# Patient Record
Sex: MALE | Race: ASIAN | NOT HISPANIC OR LATINO
[De-identification: names, ages, dates, MRNs, and addresses within clinical notes are randomized per-mention and may not be internally consistent; named-entity substitution may affect disease eponyms.]

---

## 2018-08-16 ENCOUNTER — APPOINTMENT (OUTPATIENT)
Dept: UROLOGY | Facility: CLINIC | Age: 78
End: 2018-08-16
Payer: MEDICARE

## 2018-08-16 VITALS
HEART RATE: 72 BPM | RESPIRATION RATE: 16 BRPM | OXYGEN SATURATION: 98 % | SYSTOLIC BLOOD PRESSURE: 130 MMHG | WEIGHT: 200 LBS | DIASTOLIC BLOOD PRESSURE: 86 MMHG | BODY MASS INDEX: 31.39 KG/M2 | HEIGHT: 67 IN

## 2018-08-16 DIAGNOSIS — Z87.898 PERSONAL HISTORY OF OTHER SPECIFIED CONDITIONS: ICD-10-CM

## 2018-08-16 DIAGNOSIS — Z80.0 FAMILY HISTORY OF MALIGNANT NEOPLASM OF DIGESTIVE ORGANS: ICD-10-CM

## 2018-08-16 DIAGNOSIS — N41.9 INFLAMMATORY DISEASE OF PROSTATE, UNSPECIFIED: ICD-10-CM

## 2018-08-16 DIAGNOSIS — Z87.448 PERSONAL HISTORY OF OTHER DISEASES OF URINARY SYSTEM: ICD-10-CM

## 2018-08-16 PROCEDURE — 99204 OFFICE O/P NEW MOD 45 MIN: CPT

## 2018-08-19 LAB — BACTERIA UR CULT: NORMAL

## 2018-08-21 ENCOUNTER — OUTPATIENT (OUTPATIENT)
Dept: OUTPATIENT SERVICES | Facility: HOSPITAL | Age: 78
LOS: 1 days | End: 2018-08-21
Payer: MEDICARE

## 2018-08-21 VITALS
TEMPERATURE: 98 F | RESPIRATION RATE: 16 BRPM | SYSTOLIC BLOOD PRESSURE: 184 MMHG | HEIGHT: 66 IN | HEART RATE: 74 BPM | WEIGHT: 197.98 LBS | DIASTOLIC BLOOD PRESSURE: 90 MMHG

## 2018-08-21 DIAGNOSIS — N32.81 OVERACTIVE BLADDER: ICD-10-CM

## 2018-08-21 DIAGNOSIS — Z98.890 OTHER SPECIFIED POSTPROCEDURAL STATES: Chronic | ICD-10-CM

## 2018-08-21 DIAGNOSIS — I10 ESSENTIAL (PRIMARY) HYPERTENSION: ICD-10-CM

## 2018-08-21 DIAGNOSIS — R94.31 ABNORMAL ELECTROCARDIOGRAM [ECG] [EKG]: ICD-10-CM

## 2018-08-21 DIAGNOSIS — Z90.79 ACQUIRED ABSENCE OF OTHER GENITAL ORGAN(S): Chronic | ICD-10-CM

## 2018-08-21 DIAGNOSIS — E11.9 TYPE 2 DIABETES MELLITUS WITHOUT COMPLICATIONS: ICD-10-CM

## 2018-08-21 LAB
APPEARANCE UR: CLEAR — SIGNIFICANT CHANGE UP
BILIRUB UR-MCNC: NEGATIVE — SIGNIFICANT CHANGE UP
BLOOD UR QL VISUAL: NEGATIVE — SIGNIFICANT CHANGE UP
COLOR SPEC: SIGNIFICANT CHANGE UP
GLUCOSE UR-MCNC: NEGATIVE — SIGNIFICANT CHANGE UP
HCT VFR BLD CALC: 46.5 % — SIGNIFICANT CHANGE UP (ref 39–50)
HGB BLD-MCNC: 15.7 G/DL — SIGNIFICANT CHANGE UP (ref 13–17)
KETONES UR-MCNC: NEGATIVE — SIGNIFICANT CHANGE UP
LEUKOCYTE ESTERASE UR-ACNC: NEGATIVE — SIGNIFICANT CHANGE UP
MCHC RBC-ENTMCNC: 31.4 PG — SIGNIFICANT CHANGE UP (ref 27–34)
MCHC RBC-ENTMCNC: 33.8 % — SIGNIFICANT CHANGE UP (ref 32–36)
MCV RBC AUTO: 93 FL — SIGNIFICANT CHANGE UP (ref 80–100)
NITRITE UR-MCNC: NEGATIVE — SIGNIFICANT CHANGE UP
NRBC # FLD: 0 — SIGNIFICANT CHANGE UP
PH UR: 6 — SIGNIFICANT CHANGE UP (ref 5–8)
PLATELET # BLD AUTO: 195 K/UL — SIGNIFICANT CHANGE UP (ref 150–400)
PMV BLD: 10.8 FL — SIGNIFICANT CHANGE UP (ref 7–13)
PROT UR-MCNC: SIGNIFICANT CHANGE UP
RBC # BLD: 5 M/UL — SIGNIFICANT CHANGE UP (ref 4.2–5.8)
RBC # FLD: 12.6 % — SIGNIFICANT CHANGE UP (ref 10.3–14.5)
SP GR SPEC: 1.02 — SIGNIFICANT CHANGE UP (ref 1–1.04)
UROBILINOGEN FLD QL: NORMAL — SIGNIFICANT CHANGE UP
WBC # BLD: 6.17 K/UL — SIGNIFICANT CHANGE UP (ref 3.8–10.5)
WBC # FLD AUTO: 6.17 K/UL — SIGNIFICANT CHANGE UP (ref 3.8–10.5)

## 2018-08-21 PROCEDURE — 93010 ELECTROCARDIOGRAM REPORT: CPT

## 2018-08-21 NOTE — H&P PST ADULT - PSH
H/O colonoscopy with polypectomy    H/O left inguinal hernia repair  with mesh 15 years ago  History of prostate surgery  s/p prostate microwave therapy  S/P TURP (status post transurethral resection of prostate)

## 2018-08-21 NOTE — H&P PST ADULT - PMH
HLD (hyperlipidemia)    HTN (hypertension)    Overactive bladder    Type 2 diabetes mellitus    Urinary frequency    Urinary urgency

## 2018-08-21 NOTE — H&P PST ADULT - PROBLEM SELECTOR PLAN 4
Pt with abnormal EKG in PST   Called pt's PCP but gone for the day. Spoke to Nurse Hernandez at PCP office regarding pt's high BP reading, abnormal EKG and reports of occasional palpitations.   Pt to come in tomorrow to see the PCP and obtain referral for cardiology   Cardiac evaluation with ECHO requested Pt with abnormal EKG in PST   Called pt's PCP but gone for the day. Spoke to Nurse Hernandez at PCP office regarding pt's high BP reading, abnormal EKG and reports of occasional palpitations.   Pt to see his PCP tomorrow 8/22 and obtain referral for cardiology   Cardiac evaluation with ECHO requested

## 2018-08-21 NOTE — H&P PST ADULT - CARDIOVASCULAR SYMPTOMS
palpitations/on occasion. last episode "several days ago". Pt repots palpitations not brought on occasion. last episode "several days ago". Pt repots palpitations not brought by activity/palpitations

## 2018-08-21 NOTE — H&P PST ADULT - NEGATIVE GENERAL GENITOURINARY SYMPTOMS
no flank pain R/no urine discoloration/no hematuria/no flank pain L/no bladder infections/no dysuria

## 2018-08-21 NOTE — H&P PST ADULT - NSANTHOSAYNRD_GEN_A_CORE
No. JODY screening performed.  STOP BANG Legend: 0-2 = LOW Risk; 3-4 = INTERMEDIATE Risk; 5-8 = HIGH Risk

## 2018-08-21 NOTE — H&P PST ADULT - ENDOCRINE COMMENTS
Type 2 DM on oral hypoglycemics. Avg fasting glucose 130-140mg/dl. Last Hga1c was 7.3 in July Type 2 DM on oral hypoglycemics. Avg fasting glucose 130-140mg/dl. Last Hga1c was 7.3% in July

## 2018-08-21 NOTE — H&P PST ADULT - PROBLEM SELECTOR PLAN 1
Scheduled for Cystoscopy with Botox on 9/4/2018.  Preop instructions given, pt verbalized understanding  GI prophylaxis provided

## 2018-08-21 NOTE — H&P PST ADULT - RS GEN PE MLT RESP DETAILS PC
good air movement/respirations non-labored/no chest wall tenderness/airway patent/clear to auscultation bilaterally/breath sounds equal

## 2018-08-21 NOTE — H&P PST ADULT - PROBLEM SELECTOR PLAN 3
Pt to hold Actos and Glyburide/Metformin AM of surgery   Accu check to be assessed on admission   OR booking notified of DM status

## 2018-08-21 NOTE — H&P PST ADULT - HISTORY OF PRESENT ILLNESS
77 y/o male with PMH of HTN and Type 2 DM presents to PST for preoperative evaluation with dx of overactive bladder. Pt reports long standing history of urinary urgency and frequency since teen years. h/o TURP and prostate microwave procedure with no success. Scheduled for Cystoscopy with Botox on 9/4/2018.

## 2018-08-21 NOTE — H&P PST ADULT - NEGATIVE GENERAL SYMPTOMS
no fever/no chills/no fatigue/no weight gain/no polyphagia/no polyuria/no polydipsia/no weight loss/no sweating

## 2018-09-03 ENCOUNTER — TRANSCRIPTION ENCOUNTER (OUTPATIENT)
Age: 78
End: 2018-09-03

## 2018-09-04 ENCOUNTER — APPOINTMENT (OUTPATIENT)
Dept: UROLOGY | Facility: AMBULATORY SURGERY CENTER | Age: 78
End: 2018-09-04

## 2018-09-04 ENCOUNTER — OUTPATIENT (OUTPATIENT)
Dept: OUTPATIENT SERVICES | Facility: HOSPITAL | Age: 78
LOS: 1 days | Discharge: HOME | End: 2018-09-04
Payer: MEDICARE

## 2018-09-04 VITALS
HEART RATE: 58 BPM | TEMPERATURE: 97 F | RESPIRATION RATE: 16 BRPM | DIASTOLIC BLOOD PRESSURE: 61 MMHG | SYSTOLIC BLOOD PRESSURE: 180 MMHG | OXYGEN SATURATION: 99 % | HEIGHT: 66 IN | WEIGHT: 197.98 LBS

## 2018-09-04 VITALS
DIASTOLIC BLOOD PRESSURE: 68 MMHG | TEMPERATURE: 98 F | RESPIRATION RATE: 15 BRPM | HEART RATE: 64 BPM | SYSTOLIC BLOOD PRESSURE: 169 MMHG | OXYGEN SATURATION: 99 %

## 2018-09-04 DIAGNOSIS — N32.81 OVERACTIVE BLADDER: ICD-10-CM

## 2018-09-04 DIAGNOSIS — Z98.890 OTHER SPECIFIED POSTPROCEDURAL STATES: Chronic | ICD-10-CM

## 2018-09-04 DIAGNOSIS — Z90.79 ACQUIRED ABSENCE OF OTHER GENITAL ORGAN(S): Chronic | ICD-10-CM

## 2018-09-04 LAB — GLUCOSE BLDC GLUCOMTR-MCNC: 145 MG/DL — HIGH (ref 70–99)

## 2018-09-04 PROCEDURE — 52287 CYSTOSCOPY CHEMODENERVATION: CPT

## 2018-09-04 NOTE — ASU PREOPERATIVE ASSESSMENT, ADULT ([facility] ONLY) - TEACHING/LEARNING LEARNING PREFERENCES
pictorial/written material/individual instruction/skill demonstration/verbal instruction/group instruction

## 2018-09-04 NOTE — BRIEF OPERATIVE NOTE - PROCEDURE
<<-----Click on this checkbox to enter Procedure Cystoscopic injection of botulinum toxin into bladder  09/04/2018    Active  [id number]

## 2018-09-10 PROBLEM — E11.9 TYPE 2 DIABETES MELLITUS WITHOUT COMPLICATIONS: Chronic | Status: ACTIVE | Noted: 2018-08-21

## 2018-09-10 PROBLEM — N32.81 OVERACTIVE BLADDER: Chronic | Status: ACTIVE | Noted: 2018-08-21

## 2018-09-10 PROBLEM — R39.15 URGENCY OF URINATION: Chronic | Status: ACTIVE | Noted: 2018-08-21

## 2018-09-10 PROBLEM — R35.0 FREQUENCY OF MICTURITION: Chronic | Status: ACTIVE | Noted: 2018-08-21

## 2018-09-10 PROBLEM — E78.5 HYPERLIPIDEMIA, UNSPECIFIED: Chronic | Status: ACTIVE | Noted: 2018-08-21

## 2018-09-10 PROBLEM — I10 ESSENTIAL (PRIMARY) HYPERTENSION: Chronic | Status: ACTIVE | Noted: 2018-08-21

## 2018-09-12 ENCOUNTER — APPOINTMENT (OUTPATIENT)
Dept: UROLOGY | Facility: CLINIC | Age: 78
End: 2018-09-12
Payer: MEDICARE

## 2018-09-12 VITALS — HEART RATE: 64 BPM | OXYGEN SATURATION: 98 % | SYSTOLIC BLOOD PRESSURE: 130 MMHG | DIASTOLIC BLOOD PRESSURE: 70 MMHG

## 2018-09-12 DIAGNOSIS — N40.1 BENIGN PROSTATIC HYPERPLASIA WITH LOWER URINARY TRACT SYMPMS: ICD-10-CM

## 2018-09-12 DIAGNOSIS — N13.8 BENIGN PROSTATIC HYPERPLASIA WITH LOWER URINARY TRACT SYMPMS: ICD-10-CM

## 2018-09-12 DIAGNOSIS — N32.81 OVERACTIVE BLADDER: ICD-10-CM

## 2018-09-12 DIAGNOSIS — Z78.9 OTHER SPECIFIED HEALTH STATUS: ICD-10-CM

## 2018-09-12 PROCEDURE — 99214 OFFICE O/P EST MOD 30 MIN: CPT

## 2019-01-16 ENCOUNTER — INPATIENT (INPATIENT)
Facility: HOSPITAL | Age: 79
LOS: 4 days | Discharge: HOME | DRG: 245 | End: 2019-01-21
Attending: INTERNAL MEDICINE | Admitting: INTERNAL MEDICINE
Payer: COMMERCIAL

## 2019-01-16 VITALS
TEMPERATURE: 98 F | SYSTOLIC BLOOD PRESSURE: 178 MMHG | HEART RATE: 126 BPM | WEIGHT: 195.11 LBS | RESPIRATION RATE: 20 BRPM | OXYGEN SATURATION: 98 % | DIASTOLIC BLOOD PRESSURE: 98 MMHG | HEIGHT: 66 IN

## 2019-01-16 DIAGNOSIS — I48.91 UNSPECIFIED ATRIAL FIBRILLATION: ICD-10-CM

## 2019-01-16 DIAGNOSIS — Z98.890 OTHER SPECIFIED POSTPROCEDURAL STATES: Chronic | ICD-10-CM

## 2019-01-16 DIAGNOSIS — Z90.79 ACQUIRED ABSENCE OF OTHER GENITAL ORGAN(S): Chronic | ICD-10-CM

## 2019-01-16 LAB
ALBUMIN SERPL ELPH-MCNC: 4.3 G/DL — SIGNIFICANT CHANGE UP (ref 3.3–5)
ALP SERPL-CCNC: 73 U/L — SIGNIFICANT CHANGE UP (ref 40–120)
ALT FLD-CCNC: 45 U/L — SIGNIFICANT CHANGE UP (ref 10–45)
ANION GAP SERPL CALC-SCNC: 13 MMOL/L — SIGNIFICANT CHANGE UP (ref 5–17)
APTT BLD: 27.4 SEC — LOW (ref 27.5–36.3)
AST SERPL-CCNC: 26 U/L — SIGNIFICANT CHANGE UP (ref 10–40)
BASOPHILS # BLD AUTO: 0 K/UL — SIGNIFICANT CHANGE UP (ref 0–0.2)
BASOPHILS NFR BLD AUTO: 0 % — SIGNIFICANT CHANGE UP (ref 0–2)
BILIRUB SERPL-MCNC: 0.7 MG/DL — SIGNIFICANT CHANGE UP (ref 0.2–1.2)
BUN SERPL-MCNC: 23 MG/DL — SIGNIFICANT CHANGE UP (ref 7–23)
CALCIUM SERPL-MCNC: 9.2 MG/DL — SIGNIFICANT CHANGE UP (ref 8.4–10.5)
CHLORIDE SERPL-SCNC: 105 MMOL/L — SIGNIFICANT CHANGE UP (ref 96–108)
CO2 SERPL-SCNC: 23 MMOL/L — SIGNIFICANT CHANGE UP (ref 22–31)
CREAT SERPL-MCNC: 0.91 MG/DL — SIGNIFICANT CHANGE UP (ref 0.5–1.3)
EOSINOPHIL # BLD AUTO: 0 K/UL — SIGNIFICANT CHANGE UP (ref 0–0.5)
EOSINOPHIL NFR BLD AUTO: 0.4 % — SIGNIFICANT CHANGE UP (ref 0–6)
GLUCOSE SERPL-MCNC: 172 MG/DL — HIGH (ref 70–99)
HCT VFR BLD CALC: 44.4 % — SIGNIFICANT CHANGE UP (ref 39–50)
HGB BLD-MCNC: 15.1 G/DL — SIGNIFICANT CHANGE UP (ref 13–17)
INR BLD: 1 RATIO — SIGNIFICANT CHANGE UP (ref 0.88–1.16)
LYMPHOCYTES # BLD AUTO: 0.7 K/UL — LOW (ref 1–3.3)
LYMPHOCYTES # BLD AUTO: 6.4 % — LOW (ref 13–44)
MCHC RBC-ENTMCNC: 31.9 PG — SIGNIFICANT CHANGE UP (ref 27–34)
MCHC RBC-ENTMCNC: 34 GM/DL — SIGNIFICANT CHANGE UP (ref 32–36)
MCV RBC AUTO: 93.7 FL — SIGNIFICANT CHANGE UP (ref 80–100)
MONOCYTES # BLD AUTO: 0.6 K/UL — SIGNIFICANT CHANGE UP (ref 0–0.9)
MONOCYTES NFR BLD AUTO: 5.9 % — SIGNIFICANT CHANGE UP (ref 2–14)
NEUTROPHILS # BLD AUTO: 9 K/UL — HIGH (ref 1.8–7.4)
NEUTROPHILS NFR BLD AUTO: 87.3 % — HIGH (ref 43–77)
PLATELET # BLD AUTO: 185 K/UL — SIGNIFICANT CHANGE UP (ref 150–400)
POTASSIUM SERPL-MCNC: 4.4 MMOL/L — SIGNIFICANT CHANGE UP (ref 3.5–5.3)
POTASSIUM SERPL-SCNC: 4.4 MMOL/L — SIGNIFICANT CHANGE UP (ref 3.5–5.3)
PROT SERPL-MCNC: 6.7 G/DL — SIGNIFICANT CHANGE UP (ref 6–8.3)
PROTHROM AB SERPL-ACNC: 11.5 SEC — SIGNIFICANT CHANGE UP (ref 10–12.9)
RBC # BLD: 4.74 M/UL — SIGNIFICANT CHANGE UP (ref 4.2–5.8)
RBC # FLD: 12.1 % — SIGNIFICANT CHANGE UP (ref 10.3–14.5)
SODIUM SERPL-SCNC: 141 MMOL/L — SIGNIFICANT CHANGE UP (ref 135–145)
TROPONIN T, HIGH SENSITIVITY RESULT: 21 NG/L — SIGNIFICANT CHANGE UP (ref 0–51)
WBC # BLD: 10.3 K/UL — SIGNIFICANT CHANGE UP (ref 3.8–10.5)
WBC # FLD AUTO: 10.3 K/UL — SIGNIFICANT CHANGE UP (ref 3.8–10.5)

## 2019-01-16 PROCEDURE — 73030 X-RAY EXAM OF SHOULDER: CPT | Mod: 26,LT,76

## 2019-01-16 PROCEDURE — 93010 ELECTROCARDIOGRAM REPORT: CPT

## 2019-01-16 PROCEDURE — 71045 X-RAY EXAM CHEST 1 VIEW: CPT | Mod: 26

## 2019-01-16 PROCEDURE — 99285 EMERGENCY DEPT VISIT HI MDM: CPT | Mod: 25

## 2019-01-16 PROCEDURE — 73060 X-RAY EXAM OF HUMERUS: CPT | Mod: 26,LT

## 2019-01-16 PROCEDURE — 99223 1ST HOSP IP/OBS HIGH 75: CPT

## 2019-01-16 PROCEDURE — 99222 1ST HOSP IP/OBS MODERATE 55: CPT

## 2019-01-16 PROCEDURE — 73562 X-RAY EXAM OF KNEE 3: CPT | Mod: 26,RT

## 2019-01-16 PROCEDURE — 70450 CT HEAD/BRAIN W/O DYE: CPT | Mod: 26

## 2019-01-16 RX ORDER — METOPROLOL TARTRATE 50 MG
25 TABLET ORAL ONCE
Qty: 0 | Refills: 0 | Status: COMPLETED | OUTPATIENT
Start: 2019-01-16 | End: 2019-01-16

## 2019-01-16 RX ORDER — METOPROLOL TARTRATE 50 MG
5 TABLET ORAL ONCE
Qty: 0 | Refills: 0 | Status: COMPLETED | OUTPATIENT
Start: 2019-01-16 | End: 2019-01-16

## 2019-01-16 RX ORDER — PROPOFOL 10 MG/ML
120 INJECTION, EMULSION INTRAVENOUS ONCE
Qty: 0 | Refills: 0 | Status: DISCONTINUED | OUTPATIENT
Start: 2019-01-16 | End: 2019-01-16

## 2019-01-16 RX ORDER — PROPOFOL 10 MG/ML
80 INJECTION, EMULSION INTRAVENOUS ONCE
Qty: 0 | Refills: 0 | Status: COMPLETED | OUTPATIENT
Start: 2019-01-16 | End: 2019-01-16

## 2019-01-16 RX ORDER — TETANUS TOXOID, REDUCED DIPHTHERIA TOXOID AND ACELLULAR PERTUSSIS VACCINE, ADSORBED 5; 2.5; 8; 8; 2.5 [IU]/.5ML; [IU]/.5ML; UG/.5ML; UG/.5ML; UG/.5ML
0.5 SUSPENSION INTRAMUSCULAR ONCE
Qty: 0 | Refills: 0 | Status: COMPLETED | OUTPATIENT
Start: 2019-01-16 | End: 2019-01-16

## 2019-01-16 RX ORDER — MORPHINE SULFATE 50 MG/1
4 CAPSULE, EXTENDED RELEASE ORAL ONCE
Qty: 0 | Refills: 0 | Status: DISCONTINUED | OUTPATIENT
Start: 2019-01-16 | End: 2019-01-16

## 2019-01-16 RX ORDER — ENOXAPARIN SODIUM 100 MG/ML
100 INJECTION SUBCUTANEOUS ONCE
Qty: 0 | Refills: 0 | Status: COMPLETED | OUTPATIENT
Start: 2019-01-16 | End: 2019-01-16

## 2019-01-16 RX ADMIN — TETANUS TOXOID, REDUCED DIPHTHERIA TOXOID AND ACELLULAR PERTUSSIS VACCINE, ADSORBED 0.5 MILLILITER(S): 5; 2.5; 8; 8; 2.5 SUSPENSION INTRAMUSCULAR at 16:27

## 2019-01-16 RX ADMIN — MORPHINE SULFATE 4 MILLIGRAM(S): 50 CAPSULE, EXTENDED RELEASE ORAL at 18:40

## 2019-01-16 RX ADMIN — Medication 5 MILLIGRAM(S): at 20:14

## 2019-01-16 RX ADMIN — MORPHINE SULFATE 4 MILLIGRAM(S): 50 CAPSULE, EXTENDED RELEASE ORAL at 16:24

## 2019-01-16 RX ADMIN — PROPOFOL 80 MILLIGRAM(S): 10 INJECTION, EMULSION INTRAVENOUS at 18:30

## 2019-01-16 NOTE — H&P ADULT - HISTORY OF PRESENT ILLNESS
Patient reports 78 year old male with no significant past medical history , presenting after a slip and fall in his yard.  Patient reports slipping while walking in his yard and falling onto his left shoulder, and sustaining trauma to his face and right knee. No LOC at the time , no preceding chest pain or SOB. Since incident, patient is unable to move his L shoulder secondary to severe pain, also reports mild pain to his R knee, and an abrasion to his R eyebrow. He reports no headache. Of note, patient recent stopped his metoprolol 2/2 to bradycardia. Patient reports 78 year old male with  past medical history of HTN , HLD , DMII, presenting after a slip and fall in his yard.  Patient reports slipping while walking in his yard and falling onto his left shoulder, and sustaining trauma to his face and right knee. No LOC at the time , no preceding chest pain or SOB. Since incident, patient is unable to move his L shoulder secondary to severe pain, also reports mild pain to his R knee, and an abrasion to his R eyebrow. He reports no headache. Of note, patient recent stopped his metoprolol 2/2 to bradycardia. Patient reports 78 year old male with  past medical history of non bacterial prostatitis ,  HTN , HLD , DMII, presenting after a slip and fall in his yard.  Patient reports slipping while walking in his yard and falling onto his left shoulder, and sustaining trauma to his face and right knee. No LOC at the time , no preceding chest pain or SOB. Since incident, patient was unable to move his L shoulder secondary to severe pain, he also reported mild pain to his R knee, and an abrasion to his R eyebrow. Patient reports intermittent dizziness over the past few months ,  he attributed his symptoms to botox bladder injection , however he noticed his rate was bradycardic and on day prior to admission he  stopped his metoprolol. Patient reports 78 year old male with  past medical history of non bacterial prostatitis ,  HTN , HLD , DMII, presenting after a slip and fall in his yard.  Patient reports slipping while walking in his yard and falling onto his left shoulder, and sustaining trauma to his face and right knee. No LOC at the time , no preceding chest pain or SOB. Since incident, patient was unable to move his L shoulder secondary to severe pain, he also reported mild pain to his R knee, and an abrasion to his R eyebrow. Patient reports intermittent dizziness over the past few months ,  he attributed his symptoms to botox bladder injection , however he noticed his rate was bradycardic and on day prior to admission he  stopped his metoprolol. Patient reports no chest pain , no shortness or breath, no palpitations. He reports some fatigue after walking for several minutes.  He also reports two other falls without major trauma over the past year

## 2019-01-16 NOTE — ED PROVIDER NOTE - OBJECTIVE STATEMENT
79 y/o M no significant pmhx presenting s/p mechanical slip and fall in his yard. Patient states that he was walking outside in his yard, stepped into an area of his garden that becomes a ditch and had mechanical slip and fall onto his L shoulder and subsequently hit the R side of his face and R knee. States that he landed onto his L shoulder and has been unable to move his L shoulder since that time. Experiencing very significant pain. States that he has some mild pain to his R knee, and noticed an abrasion to his R eyebrow as well. He denies any headache, remembers everything that happened and did not lose consciousness. He states that he has been able to walk since that time.

## 2019-01-16 NOTE — H&P ADULT - PROBLEM SELECTOR PLAN 4
1 person assist -Hold oral hypoglycemics  -Start HISS, check fsg qac/qhs  -check a1c in am  -consistent carb diet

## 2019-01-16 NOTE — ED PROVIDER NOTE - MEDICAL DECISION MAKING DETAILS
Sixto: fell, head trauma, lt shoulder pain, looks dislocated, nl sensation, nl pulses. CT head, xray. will treat pain

## 2019-01-16 NOTE — ED PROVIDER NOTE - ATTENDING CONTRIBUTION TO CARE
I performed a history and physical exam of the patient and discussed their management with the resident and /or advanced care provider. I reviewed the resident and /or ACP's note and agree with the documented findings and plan of care. My medical decison making and observations are found above.  Abd soft, nl neuro,

## 2019-01-16 NOTE — H&P ADULT - NSHPPHYSICALEXAM_GEN_ALL_CORE
Vital Signs Last 24 Hrs  T(C): 36.8 (16 Jan 2019 22:01), Max: 36.8 (16 Jan 2019 22:01)  T(F): 98.3 (16 Jan 2019 22:01), Max: 98.3 (16 Jan 2019 22:01)  HR: 119 (16 Jan 2019 22:01) (97 - 126)  BP: 156/96 (16 Jan 2019 22:01) (148/96 - 178/98)  BP(mean): --  RR: 18 (16 Jan 2019 22:01) (18 - 20)  SpO2: 99% (16 Jan 2019 22:01) (98% - 100%) Vital Signs Last 24 Hrs  T(C): 36.8 (16 Jan 2019 22:01), Max: 36.8 (16 Jan 2019 22:01)  T(F): 98.3 (16 Jan 2019 22:01), Max: 98.3 (16 Jan 2019 22:01)  HR: 119 (16 Jan 2019 22:01) (97 - 126)  BP: 156/96 (16 Jan 2019 22:01) (148/96 - 178/98)  BP(mean): --  RR: 18 (16 Jan 2019 22:01) (18 - 20)  SpO2: 99% (16 Jan 2019 22:01) (98% - 100%)    GENERAL: No acute distress, well-developed  HEAD:  Atraumatic, Normocephalic  ENT: EOMI, PERRLA, conjunctiva and sclera clear,  moist mucosa no pharyngeal erythema or exudates   NECK: supple , no JVD   CHEST/LUNG: Clear to auscultation bilaterally; No wheeze, equal breath sounds bilaterally   BACK: No spinal tenderness,  No CVA tenderness   HEART: Regular rate and rhythm; No murmurs, rubs, or gallops  ABDOMEN: Soft, Nontender, Nondistended; Bowel sounds present  EXTREMITIES:  No clubbing, cyanosis, or edema  MSK: No joint swelling or effusions, ROM intact   PSYCH: Normal behavior/affect  NEUROLOGY: AAOx3, non-focal, cranial nerves intact  SKIN: Normal color, No rashes or lesions

## 2019-01-16 NOTE — H&P ADULT - NSHPSOCIALHISTORY_GEN_ALL_CORE
Social History:    Marital Status:  ( x  )    (   ) Single    (   )    (  )   Occupation: retired  Lives with: (  ) alone  (x  ) children   ( x ) spouse   (  ) parents  (  ) other    Substance Use (street drugs): (x  ) never used  (  ) other:  Tobacco Usage:  (x  ) never smoked   (   ) former smoker   (   ) current smoker  (     ) pack year  (        ) last cigarette date  Alcohol Usage: no etoh use

## 2019-01-16 NOTE — ED ADULT TRIAGE NOTE - CHIEF COMPLAINT QUOTE
mechanical fall 11AM impact to L side; denies hitting head denies LOC not on blood thinners. c/o pain to L shoulder

## 2019-01-16 NOTE — H&P ADULT - NSHPLABSRESULTS_GEN_ALL_CORE
Labs personally reviewed:                          15.1   10.3  )-----------( 185      ( 16 Jan 2019 22:07 )             44.4     01-16    141  |  105  |  23  ----------------------------<  172<H>  4.4   |  23  |  0.91    Ca    9.2      16 Jan 2019 22:07    TPro  6.7  /  Alb  4.3  /  TBili  0.7  /  DBili  x   /  AST  26  /  ALT  45  /  AlkPhos  73  01-16        LIVER FUNCTIONS - ( 16 Jan 2019 22:07 )  Alb: 4.3 g/dL / Pro: 6.7 g/dL / ALK PHOS: 73 U/L / ALT: 45 U/L / AST: 26 U/L / GGT: x           PT/INR - ( 16 Jan 2019 22:20 )   PT: 11.5 sec;   INR: 1.00 ratio         PTT - ( 16 Jan 2019 22:20 )  PTT:27.4 sec    CAPILLARY BLOOD GLUCOSE          Imaging:  CXR personally reviewed: no focal opacity    EKG personally reviewed: afib 110 bpm   CTH: No acute intracranial hemorrhage or calvarial fracture. Mild chronic   microvascular changes.    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 If prior angiographic imaging of the head   is available correlation with such report is recommended. If not, these   findings can be further worked up with either nonemergent CTA or MRA of   the head, on an outpatient basis.    Xray L shoulder: near complete inferior shoulder dislocation from glenoid   fossa by full humeral head width, and slight anterior component   no acute fracture  Repeat should X ray: successful reduction of previously seen left shoulder   dislocation

## 2019-01-16 NOTE — H&P ADULT - ASSESSMENT
78 M w/ pmh nonbacterial prostatitis ,  HTN , HLD , DMII, p/w fall c/b L shoulder dislocation , found to be in afib

## 2019-01-16 NOTE — ED PROVIDER NOTE - PROGRESS NOTE DETAILS
Patient tachycardic in 120s, EKG shows Afib. per patient, with h/o PAC and on metoprolol. Stopped metoprolol yesterday due to bradycardia. No known h/o afib. Will give lopressor and reassess. HR improved, will rpt ekg. Patient taken to CT

## 2019-01-16 NOTE — ED ADULT NURSE NOTE - OBJECTIVE STATEMENT
78 year old male presents to the ED Complaining of left sided shoulder pain s/p a mechanical fall at home today wherein the PT states he  tripped on a flower bed and fell into a ditch. Pt ambulatory since fall, denies LOC, is not on blood thinners. Pt complains of hitting the right side of his head and right knee. Pt is A&O x 4, VSS, afebrile, ambulating independently. Pt denies fever, chills, NVD, SOB, or chest pain. 20G IV placed in right AC, labs drawn as verbally instructed by MD, bed in low position, safety measures in place. Pt left arm shows deformity at the shoulder, positive and equal upper extremity pulses bilaterally. Pt left arm elevated on pillows.

## 2019-01-16 NOTE — ED ADULT NURSE REASSESSMENT NOTE - NS ED NURSE REASSESS COMMENT FT1
Pt consciously sedated for a L shoulder fixation at 1859,  pt tolerated well, please see sedation flow sheet and paperwork in Pts chart.

## 2019-01-16 NOTE — ED ADULT NURSE NOTE - NSIMPLEMENTINTERV_GEN_ALL_ED
Implemented All Fall Risk Interventions:  [place] to call system. Call bell, personal items and telephone within reach. Instruct patient to call for assistance. Room bathroom lighting operational. Non-slip footwear when patient is off stretcher. Physically safe environment: no spills, clutter or unnecessary equipment. Stretcher in lowest position, wheels locked, appropriate side rails in place. Provide visual cue, wrist band, yellow gown, etc. Monitor gait and stability. Monitor for mental status changes and reorient to person, place, and time. Review medications for side effects contributing to fall risk. Reinforce activity limits and safety measures with patient and family.

## 2019-01-16 NOTE — H&P ADULT - PROBLEM SELECTOR PLAN 1
new onset , asymptomatic at this time , s/p full dose Lovenox , will consult cardiology for possible cardioversion   - continue full dose AC   - cardiology consult - day team to f/u further recommendations   - metoprolol new onset , asymptomatic at this time , s/p full dose Lovenox , will consult cardiology for possible cardioversion   - continue full dose AC   - cardiology consult - day team to f/u further recommendations   - metoprolol  - telemetry

## 2019-01-16 NOTE — ED PROVIDER NOTE - NS ED ROS FT
Constitutional: No fever or chills  Eyes: No visual changes, eye pain or redness  HEENT: No throat pain, ear pain, nasal pain. No nose bleeding.  CV: No chest pain or lower extremity edema  Resp: No SOB no cough  GI: No abd pain. No nausea or vomiting. No diarrhea. No constipation.   : No dysuria, hematuria.   MSK: Severe L shoulder pain, R knee pain  Skin: No rash  Neuro: No headache. No numbness or tingling. No weakness.

## 2019-01-16 NOTE — H&P ADULT - PROBLEM SELECTOR PLAN 3
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

## 2019-01-17 DIAGNOSIS — I48.91 UNSPECIFIED ATRIAL FIBRILLATION: ICD-10-CM

## 2019-01-17 DIAGNOSIS — I10 ESSENTIAL (PRIMARY) HYPERTENSION: ICD-10-CM

## 2019-01-17 DIAGNOSIS — E11.9 TYPE 2 DIABETES MELLITUS WITHOUT COMPLICATIONS: ICD-10-CM

## 2019-01-17 DIAGNOSIS — E78.5 HYPERLIPIDEMIA, UNSPECIFIED: ICD-10-CM

## 2019-01-17 DIAGNOSIS — S43.006A UNSPECIFIED DISLOCATION OF UNSPECIFIED SHOULDER JOINT, INITIAL ENCOUNTER: ICD-10-CM

## 2019-01-17 LAB
ALBUMIN SERPL ELPH-MCNC: 3.8 G/DL — SIGNIFICANT CHANGE UP (ref 3.3–5)
ALP SERPL-CCNC: 64 U/L — SIGNIFICANT CHANGE UP (ref 40–120)
ALT FLD-CCNC: 36 U/L — SIGNIFICANT CHANGE UP (ref 10–45)
ANION GAP SERPL CALC-SCNC: 10 MMOL/L — SIGNIFICANT CHANGE UP (ref 5–17)
AST SERPL-CCNC: 22 U/L — SIGNIFICANT CHANGE UP (ref 10–40)
BASOPHILS # BLD AUTO: 0 K/UL — SIGNIFICANT CHANGE UP (ref 0–0.2)
BASOPHILS NFR BLD AUTO: 0 % — SIGNIFICANT CHANGE UP (ref 0–2)
BILIRUB SERPL-MCNC: 0.7 MG/DL — SIGNIFICANT CHANGE UP (ref 0.2–1.2)
BUN SERPL-MCNC: 20 MG/DL — SIGNIFICANT CHANGE UP (ref 7–23)
CALCIUM SERPL-MCNC: 9 MG/DL — SIGNIFICANT CHANGE UP (ref 8.4–10.5)
CHLORIDE SERPL-SCNC: 108 MMOL/L — SIGNIFICANT CHANGE UP (ref 96–108)
CO2 SERPL-SCNC: 25 MMOL/L — SIGNIFICANT CHANGE UP (ref 22–31)
CREAT SERPL-MCNC: 1.01 MG/DL — SIGNIFICANT CHANGE UP (ref 0.5–1.3)
EOSINOPHIL # BLD AUTO: 0.05 K/UL — SIGNIFICANT CHANGE UP (ref 0–0.5)
EOSINOPHIL NFR BLD AUTO: 0.8 % — SIGNIFICANT CHANGE UP (ref 0–6)
GLUCOSE BLDC GLUCOMTR-MCNC: 130 MG/DL — HIGH (ref 70–99)
GLUCOSE BLDC GLUCOMTR-MCNC: 173 MG/DL — HIGH (ref 70–99)
GLUCOSE BLDC GLUCOMTR-MCNC: 242 MG/DL — HIGH (ref 70–99)
GLUCOSE SERPL-MCNC: 124 MG/DL — HIGH (ref 70–99)
HBA1C BLD-MCNC: 7.3 % — HIGH (ref 4–5.6)
HCT VFR BLD CALC: 40.9 % — SIGNIFICANT CHANGE UP (ref 39–50)
HGB BLD-MCNC: 13.4 G/DL — SIGNIFICANT CHANGE UP (ref 13–17)
IMM GRANULOCYTES NFR BLD AUTO: 0.3 % — SIGNIFICANT CHANGE UP (ref 0–1.5)
LYMPHOCYTES # BLD AUTO: 0.49 K/UL — LOW (ref 1–3.3)
LYMPHOCYTES # BLD AUTO: 8 % — LOW (ref 13–44)
MCHC RBC-ENTMCNC: 31 PG — SIGNIFICANT CHANGE UP (ref 27–34)
MCHC RBC-ENTMCNC: 32.8 GM/DL — SIGNIFICANT CHANGE UP (ref 32–36)
MCV RBC AUTO: 94.7 FL — SIGNIFICANT CHANGE UP (ref 80–100)
MONOCYTES # BLD AUTO: 0.87 K/UL — SIGNIFICANT CHANGE UP (ref 0–0.9)
MONOCYTES NFR BLD AUTO: 14.2 % — HIGH (ref 2–14)
NEUTROPHILS # BLD AUTO: 4.71 K/UL — SIGNIFICANT CHANGE UP (ref 1.8–7.4)
NEUTROPHILS NFR BLD AUTO: 76.7 % — SIGNIFICANT CHANGE UP (ref 43–77)
PLATELET # BLD AUTO: 172 K/UL — SIGNIFICANT CHANGE UP (ref 150–400)
POTASSIUM SERPL-MCNC: 3.7 MMOL/L — SIGNIFICANT CHANGE UP (ref 3.5–5.3)
POTASSIUM SERPL-SCNC: 3.7 MMOL/L — SIGNIFICANT CHANGE UP (ref 3.5–5.3)
PROT SERPL-MCNC: 5.8 G/DL — LOW (ref 6–8.3)
RBC # BLD: 4.32 M/UL — SIGNIFICANT CHANGE UP (ref 4.2–5.8)
RBC # FLD: 13.6 % — SIGNIFICANT CHANGE UP (ref 10.3–14.5)
SODIUM SERPL-SCNC: 143 MMOL/L — SIGNIFICANT CHANGE UP (ref 135–145)
WBC # BLD: 6.14 K/UL — SIGNIFICANT CHANGE UP (ref 3.8–10.5)
WBC # FLD AUTO: 6.14 K/UL — SIGNIFICANT CHANGE UP (ref 3.8–10.5)

## 2019-01-17 PROCEDURE — 99232 SBSQ HOSP IP/OBS MODERATE 35: CPT

## 2019-01-17 PROCEDURE — 99223 1ST HOSP IP/OBS HIGH 75: CPT

## 2019-01-17 PROCEDURE — 93312 ECHO TRANSESOPHAGEAL: CPT | Mod: 26

## 2019-01-17 PROCEDURE — 93306 TTE W/DOPPLER COMPLETE: CPT | Mod: 26

## 2019-01-17 PROCEDURE — 93325 DOPPLER ECHO COLOR FLOW MAPG: CPT | Mod: 26,59

## 2019-01-17 PROCEDURE — 93010 ELECTROCARDIOGRAM REPORT: CPT

## 2019-01-17 PROCEDURE — 70544 MR ANGIOGRAPHY HEAD W/O DYE: CPT | Mod: 26

## 2019-01-17 PROCEDURE — 92960 CARDIOVERSION ELECTRIC EXT: CPT | Mod: 59

## 2019-01-17 PROCEDURE — 93321 DOPPLER ECHO F-UP/LMTD STD: CPT | Mod: 26,59

## 2019-01-17 RX ORDER — METOPROLOL TARTRATE 50 MG
50 TABLET ORAL
Qty: 0 | Refills: 0 | Status: DISCONTINUED | OUTPATIENT
Start: 2019-01-17 | End: 2019-01-17

## 2019-01-17 RX ORDER — INSULIN LISPRO 100/ML
VIAL (ML) SUBCUTANEOUS
Qty: 0 | Refills: 0 | Status: DISCONTINUED | OUTPATIENT
Start: 2019-01-17 | End: 2019-01-21

## 2019-01-17 RX ORDER — METOPROLOL TARTRATE 50 MG
25 TABLET ORAL
Qty: 0 | Refills: 0 | Status: DISCONTINUED | OUTPATIENT
Start: 2019-01-17 | End: 2019-01-18

## 2019-01-17 RX ORDER — DEXTROSE 50 % IN WATER 50 %
15 SYRINGE (ML) INTRAVENOUS ONCE
Qty: 0 | Refills: 0 | Status: DISCONTINUED | OUTPATIENT
Start: 2019-01-17 | End: 2019-01-21

## 2019-01-17 RX ORDER — FUROSEMIDE 40 MG
40 TABLET ORAL
Qty: 0 | Refills: 0 | Status: DISCONTINUED | OUTPATIENT
Start: 2019-01-17 | End: 2019-01-17

## 2019-01-17 RX ORDER — DEXTROSE 50 % IN WATER 50 %
25 SYRINGE (ML) INTRAVENOUS ONCE
Qty: 0 | Refills: 0 | Status: DISCONTINUED | OUTPATIENT
Start: 2019-01-17 | End: 2019-01-21

## 2019-01-17 RX ORDER — LISINOPRIL 2.5 MG/1
40 TABLET ORAL DAILY
Qty: 0 | Refills: 0 | Status: DISCONTINUED | OUTPATIENT
Start: 2019-01-17 | End: 2019-01-21

## 2019-01-17 RX ORDER — ACETAMINOPHEN 500 MG
650 TABLET ORAL EVERY 6 HOURS
Qty: 0 | Refills: 0 | Status: DISCONTINUED | OUTPATIENT
Start: 2019-01-17 | End: 2019-01-21

## 2019-01-17 RX ORDER — SIMVASTATIN 20 MG/1
10 TABLET, FILM COATED ORAL AT BEDTIME
Qty: 0 | Refills: 0 | Status: DISCONTINUED | OUTPATIENT
Start: 2019-01-17 | End: 2019-01-21

## 2019-01-17 RX ORDER — ENOXAPARIN SODIUM 100 MG/ML
90 INJECTION SUBCUTANEOUS EVERY 12 HOURS
Qty: 0 | Refills: 0 | Status: DISCONTINUED | OUTPATIENT
Start: 2019-01-17 | End: 2019-01-17

## 2019-01-17 RX ORDER — GLUCAGON INJECTION, SOLUTION 0.5 MG/.1ML
1 INJECTION, SOLUTION SUBCUTANEOUS ONCE
Qty: 0 | Refills: 0 | Status: DISCONTINUED | OUTPATIENT
Start: 2019-01-17 | End: 2019-01-21

## 2019-01-17 RX ORDER — AMLODIPINE BESYLATE 2.5 MG/1
10 TABLET ORAL DAILY
Qty: 0 | Refills: 0 | Status: DISCONTINUED | OUTPATIENT
Start: 2019-01-17 | End: 2019-01-21

## 2019-01-17 RX ORDER — APIXABAN 2.5 MG/1
5 TABLET, FILM COATED ORAL EVERY 12 HOURS
Qty: 0 | Refills: 0 | Status: DISCONTINUED | OUTPATIENT
Start: 2019-01-17 | End: 2019-01-19

## 2019-01-17 RX ORDER — CELECOXIB 200 MG/1
100 CAPSULE ORAL
Qty: 0 | Refills: 0 | Status: DISCONTINUED | OUTPATIENT
Start: 2019-01-17 | End: 2019-01-21

## 2019-01-17 RX ORDER — INFLUENZA VIRUS VACCINE 15; 15; 15; 15 UG/.5ML; UG/.5ML; UG/.5ML; UG/.5ML
0.5 SUSPENSION INTRAMUSCULAR ONCE
Qty: 0 | Refills: 0 | Status: DISCONTINUED | OUTPATIENT
Start: 2019-01-17 | End: 2019-01-21

## 2019-01-17 RX ORDER — INSULIN LISPRO 100/ML
VIAL (ML) SUBCUTANEOUS AT BEDTIME
Qty: 0 | Refills: 0 | Status: DISCONTINUED | OUTPATIENT
Start: 2019-01-17 | End: 2019-01-21

## 2019-01-17 RX ORDER — SODIUM CHLORIDE 9 MG/ML
1000 INJECTION, SOLUTION INTRAVENOUS
Qty: 0 | Refills: 0 | Status: DISCONTINUED | OUTPATIENT
Start: 2019-01-17 | End: 2019-01-21

## 2019-01-17 RX ADMIN — CELECOXIB 100 MILLIGRAM(S): 200 CAPSULE ORAL at 22:31

## 2019-01-17 RX ADMIN — AMLODIPINE BESYLATE 10 MILLIGRAM(S): 2.5 TABLET ORAL at 06:13

## 2019-01-17 RX ADMIN — Medication 1 MILLIGRAM(S): at 22:32

## 2019-01-17 RX ADMIN — Medication 25 MILLIGRAM(S): at 00:18

## 2019-01-17 RX ADMIN — CELECOXIB 100 MILLIGRAM(S): 200 CAPSULE ORAL at 23:10

## 2019-01-17 RX ADMIN — Medication 1 MILLIGRAM(S): at 00:56

## 2019-01-17 RX ADMIN — ENOXAPARIN SODIUM 90 MILLIGRAM(S): 100 INJECTION SUBCUTANEOUS at 12:11

## 2019-01-17 RX ADMIN — ENOXAPARIN SODIUM 100 MILLIGRAM(S): 100 INJECTION SUBCUTANEOUS at 00:18

## 2019-01-17 RX ADMIN — Medication 1: at 13:35

## 2019-01-17 RX ADMIN — Medication 50 MILLIGRAM(S): at 09:00

## 2019-01-17 RX ADMIN — APIXABAN 5 MILLIGRAM(S): 2.5 TABLET, FILM COATED ORAL at 22:34

## 2019-01-17 RX ADMIN — LISINOPRIL 40 MILLIGRAM(S): 2.5 TABLET ORAL at 06:14

## 2019-01-17 RX ADMIN — Medication 25 MILLIGRAM(S): at 22:31

## 2019-01-17 NOTE — CONSULT NOTE ADULT - SUBJECTIVE AND OBJECTIVE BOX
Initial Cardiology Attending Consult    CHIEF COMPLAINT: fall    HISTORY OF PRESENT ILLNESS:  BETH MAYFIELD is a 78y Male patient PMH HTN, DM , HLD, BPH presenting with a mechanical fall as per the patient he was walking in his yard to fill the birdfeeder when he fell back onto his left shoulder, he could not get up or move his shoulder due to pain.  No LOC. no CP or SOB.  HE came to the ED and was found to have a dislocated shoulder that was reduced.  IT was also noted he was in afib with RVR.  He states he underwent cardiac clearance 3 months ago for a bladder botox injection with stress ECHO that was NML.  His outside cardiologist started him on metoprolol 50mg bid.  He noted yesterday he felt weak, fatigue and took his pulse found it to be a "weak pulse, missing beats 44-46 bpm"  he attribted this to the metoprolol so he self dc it yesterday  he denies prior afib  no CVA hx , no tia symptoms    He has had 3 falls this past yeareach mechanical falls witnessed, no LOC,     Allergies    No Known Allergies    Intolerances    	    PAST MEDICAL & SURGICAL HISTORY:  Urinary urgency  Urinary frequency  HLD (hyperlipidemia)  Overactive bladder  HTN (hypertension)  Type 2 diabetes mellitus  H/O colonoscopy with polypectomy  History of prostate surgery: s/p prostate microwave therapy  S/P TURP (status post transurethral resection of prostate)  H/O left inguinal hernia repair: with mesh 15 years ago      FAMILY HISTORY:  Family history of colon cancer      SOCIAL HISTORY:    [ ] Non-smoker  [ ] Smoker  [ ] Alcohol      REVIEW OF SYSTEMS:  CONSTITUTIONAL: No fever, weight loss, or fatigue  EYES: No eye pain, visual disturbances, or discharge  ENMT:  No difficulty hearing, tinnitus, vertigo; No sinus or throat pain  NECK: No pain or stiffness  RESPIRATORY: No cough, wheezing, chills or hemoptysis; No Shortness of Breath  CARDIOVASCULAR: No chest pain, palpitations, passing out, dizziness, or leg swelling  GASTROINTESTINAL: No abdominal or epigastric pain. No nausea, vomiting, or hematemesis; No diarrhea or constipation. No melena or hematochezia.  GENITOURINARY: No dysuria, frequency, hematuria, or incontinence  NEUROLOGICAL: No headaches, memory loss, loss of strength, numbness, or tremors  SKIN: No itching, burning, rashes, or lesions   LYMPH Nodes: No enlarged glands  ENDOCRINE: No heat or cold intolerance; No hair loss  MUSCULOSKELETAL: + shoulder pain , see hpi  PSYCHIATRIC: No depression, anxiety, mood swings, or difficulty sleeping  HEME/LYMPH: No easy bruising, or bleeding gums  ALLERY AND IMMUNOLOGIC: No hives or eczema	    [ ] All others negative	  [ ] Unable to obtain    PHYSICAL EXAM:  I&O's Summary    Vital Signs Last 24 Hrs  T(C): 36.7 (17 Jan 2019 00:14), Max: 36.8 (16 Jan 2019 22:01)  T(F): 98.1 (17 Jan 2019 00:14), Max: 98.3 (16 Jan 2019 22:01)  HR: 102 (17 Jan 2019 00:14) (97 - 126)  BP: 157/86 (17 Jan 2019 00:14) (148/96 - 178/98)  BP(mean): --  RR: 18 (17 Jan 2019 00:14) (18 - 20)  SpO2: 98% (17 Jan 2019 00:14) (98% - 100%)    Appearance: Normal	  HEENT:   Normal oral mucosa, PERRL, EOMI	  Lymphatic: No lymphadenopathy  Cardiovascular:irreg , No JVD, No murmurs, No edema  Respiratory: Lungs clear to auscultation	  Psychiatry: A & O x 3, Mood & affect appropriate  Gastrointestinal:  Soft, Non-tender, + BS	  Skin: No rashes, No ecchymoses, No cyanosis	  Neurologic: Non-focal  Extremities: Normal range of motion, No clubbing, cyanosis or edema  Vascular: Peripheral pulses palpable 2+ bilaterally    MEDICATIONS:  Home Medications:  Actos 15 mg oral tablet: 1 tab(s) orally once a day (17 Jan 2019 00:48)  Aleve:  (17 Jan 2019 00:48)  amLODIPine 10 mg oral tablet: 1 tab(s) orally once a day (17 Jan 2019 00:48)  CeleBREX 100 mg oral capsule: 1 cap(s) orally 2 times a day (17 Jan 2019 00:48)  glyburide-metformin 5 mg-500 mg oral tablet: 1 tab(s) orally 2 times a day (17 Jan 2019 00:48)  LORazepam 1 mg oral tablet: 1 tab(s) orally once a day (at bedtime) (17 Jan 2019 00:48)  metoprolol tartrate 50 mg oral tablet: 1 tab(s) orally 2 times a day (17 Jan 2019 00:48)- not taken since 1/14  quinapril 40 mg oral tablet: 1 tab(s) orally once a day (17 Jan 2019 00:48)  simvastatin 40 mg oral tablet: 1 tab(s) orally once a day (at bedtime) (17 Jan 2019 00:48)      LABS:	 	    CBC Full  -  ( 16 Jan 2019 22:07 )  WBC Count : 10.3 K/uL  Hemoglobin : 15.1 g/dL  Hematocrit : 44.4 %  Platelet Count - Automated : 185 K/uL  Mean Cell Volume : 93.7 fl  Mean Cell Hemoglobin : 31.9 pg  Mean Cell Hemoglobin Concentration : 34.0 gm/dL  Auto Neutrophil # : 9.0 K/uL  Auto Lymphocyte # : 0.7 K/uL  Auto Monocyte # : 0.6 K/uL  Auto Eosinophil # : 0.0 K/uL  Auto Basophil # : 0.0 K/uL  Auto Neutrophil % : 87.3 %  Auto Lymphocyte % : 6.4 %  Auto Monocyte % : 5.9 %  Auto Eosinophil % : 0.4 %  Auto Basophil % : 0.0 %    01-16    141  |  105  |  23  ----------------------------<  172<H>  4.4   |  23  |  0.91    Ca    9.2      16 Jan 2019 22:07    TPro  6.7  /  Alb  4.3  /  TBili  0.7  /  DBili  x   /  AST  26  /  ALT  45  /  AlkPhos  73  01-16      proBNP:   Lipid Profile:   HgA1c:   TSH:     TROPONIN:        TELEMETRY: 	  afib 120s - 90  ECG:  	afib 110 bpm lat TWI  RADIOLOGY:  OTHER: 	    CARDIAC TESTING/STUDIES:    [ ] Echocardiogram:  [ ]  Catheterization:  [ ] Stress Test:  	  	  ASSESSMENT/PLAN: 	    BETH MAYFIELD is a 78y Male patient PMH HTN, DM , HLD, BPH presenting with a mechanical fall caugin shoulder dislocation.  And was found to be in new onset afib with RVR.  He is asymptomatic and was likely in afib yesterday due to his pulse being abnormal.  The RVR may also be secondary to abrupt bb withdrawl.  will plan for DANNY with possible DCCV  he has chads vasc 4, received lovenox in ED for AC, may continue Lovenox at this time with plan to begin eliquis if nml ECHO (no valve abnormalities)      afib new onset , rvr  check TSH  -restart metoprolol 50mg bid (may need to adjust dose post dccv)  -chads vasc 4, received lovenox in ED for AC, may continue Lovenox at this time with plan to begin eliquis if nml ECHO (no valve abnormalities  -monitor on tele  Ep consult for DANNY and DCCV        Arian Ness MD, PhD  Cardiology Attending  [facility]  [phone]    (Cardiology Nocturnist cell number available 7 pm - 7 am every night; available daytime week days for follow-up only; daytime weekends covered by general cardiology consult service)
CHIEF COMPLAINT: New onset Afib    HISTORY OF PRESENT ILLNESS:  78y Male patient PMH HTN, DM , HLD, BPH presenting with a fall as per the patient he was walking in his yard to fill the birdfeeder when he fell back onto his left shoulder, he could not get up or move his shoulder due to pain.  No LOC. no CP or SOB.  HE came to the ED and was found to have a dislocated shoulder that was reduced.  It was also noted he was in afib with RVR.  He states he underwent cardiac clearance 3 months ago for a bladder botox injection with stress ECHO that was NML.  His outside cardiologist (Dr. Coreas) started him on metoprolol 50mg bid.  He noted yesterday he felt weak, fatigue and took his pulse found it to be a "weak pulse, missing beats 44-46 bpm"  he attribted this to the metoprolol so he self dc it yesterday  he denies prior afib. no CVA hx , no TIA symptoms. He has had 3 falls this past year each mechanical falls witnessed, no LOC,     PAST MEDICAL & SURGICAL HISTORY:  Urinary urgency  Urinary frequency  HLD (hyperlipidemia)  Overactive bladder  HTN (hypertension)  Type 2 diabetes mellitus    Allergies: No Known Allergies    Intolerances    MEDICATIONS:  amLODIPine   Tablet 10 milliGRAM(s) Oral daily  enoxaparin Injectable 90 milliGRAM(s) SubCutaneous every 12 hours  lisinopril 40 milliGRAM(s) Oral daily  metoprolol tartrate 50 milliGRAM(s) Oral two times a day    acetaminophen   Tablet .. 650 milliGRAM(s) Oral every 6 hours PRN  celecoxib 100 milliGRAM(s) Oral two times a day PRN  LORazepam     Tablet 1 milliGRAM(s) Oral at bedtime    dextrose 40% Gel 15 Gram(s) Oral once PRN  dextrose 50% Injectable 25 Gram(s) IV Push once  glucagon  Injectable 1 milliGRAM(s) IntraMuscular once PRN  insulin lispro (HumaLOG) corrective regimen sliding scale   SubCutaneous three times a day before meals  insulin lispro (HumaLOG) corrective regimen sliding scale   SubCutaneous at bedtime  simvastatin 10 milliGRAM(s) Oral at bedtime    dextrose 5%. 1000 milliLiter(s) IV Continuous <Continuous>  influenza   Vaccine 0.5 milliLiter(s) IntraMuscular once      PAST MEDICAL & SURGICAL HISTORY:  Urinary urgency  Urinary frequency  HLD (hyperlipidemia)  Overactive bladder  HTN (hypertension)  Type 2 diabetes mellitus  H/O colonoscopy with polypectomy  History of prostate surgery: s/p prostate microwave therapy  S/P TURP (status post transurethral resection of prostate)  H/O left inguinal hernia repair: with mesh 15 years ago      FAMILY HISTORY:  Family history of colon cancer    SOCIAL HISTORY:        REVIEW OF SYSTEMS:  See HPI. Otherwise, 10 point ROS done and otherwise negative.    PHYSICAL EXAM:  T(C): 36.8 (01-17-19 @ 08:11), Max: 36.9 (01-17-19 @ 06:11)  HR: 104 (01-17-19 @ 08:11) (96 - 126)  BP: 160/99 (01-17-19 @ 08:11) (148/96 - 178/98)  RR: 19 (01-17-19 @ 08:11) (18 - 20)  SpO2: 95% (01-17-19 @ 08:11) (95% - 100%)  I&O's Summary    Appearance: NAD  HEENT:   Normal oral mucosa, PERRL, EOMI	  Cardiovascular: S1 S2, Irregular, No JVD, No murmurs, No edema  Respiratory: Lungs clear to auscultation	  Psychiatry: A & O x 3, Mood & affect appropriate  Gastrointestinal:  Soft, Non-tender, + BS	  Skin: (+) ecchymoses on right elbows and b/l knees	  Neurologic: Non-focal  Extremities: No clubbing, cyanosis or edema. Limited motion on left shoulder due to dislocation from the fall  Vascular: Peripheral pulses palpable 2+ bilaterally      LABS:	 	    CBC Full  -  ( 17 Jan 2019 07:00 )  WBC Count : 6.14 K/uL  Hemoglobin : 13.4 g/dL  Hematocrit : 40.9 %  Platelet Count - Automated : 172 K/uL  Mean Cell Volume : 94.7 fl  Mean Cell Hemoglobin : 31.0 pg  Mean Cell Hemoglobin Concentration : 32.8 gm/dL  Auto Neutrophil # : 4.71 K/uL  Auto Lymphocyte # : 0.49 K/uL  Auto Monocyte # : 0.87 K/uL  Auto Eosinophil # : 0.05 K/uL  Auto Basophil # : 0.00 K/uL  Auto Neutrophil % : 76.7 %  Auto Lymphocyte % : 8.0 %  Auto Monocyte % : 14.2 %  Auto Eosinophil % : 0.8 %  Auto Basophil % : 0.0 %    01-17    143  |  108  |  20  ----------------------------<  124<H>  3.7   |  25  |  1.01  01-16    141  |  105  |  23  ----------------------------<  172<H>  4.4   |  23  |  0.91    Ca    9.0      17 Jan 2019 05:31  Ca    9.2      16 Jan 2019 22:07    TPro  5.8<L>  /  Alb  3.8  /  TBili  0.7  /  DBili  x   /  AST  22  /  ALT  36  /  AlkPhos  64  01-17  TPro  6.7  /  Alb  4.3  /  TBili  0.7  /  DBili  x   /  AST  26  /  ALT  45  /  AlkPhos  73  01-16      TELEMETRY: Afib rate 's (overnight up to 150's)    	    ECG: Personally reviewed

## 2019-01-17 NOTE — CONSULT NOTE ADULT - ASSESSMENT
78M PMH HTN, DM , HLD, BPH presenting with a fall causing shoulder dislocation, found to be in new onset afib with RVR.    New onset Afib with RVR  -Currently in Afib with rates 90-110s  -Check TSH  -f/u TTE  -Continue with metoprolol 50mg bid for rate control, may need to adjust dose after DANNY/DCCV  -[id number]–VASc score 4; c/w A/C with lovenox, plan to start Eliquis if TTE normal   -monitor on tele  -Keep NPO for DANNY/Cardioversion    FERNANDA Oliva, NP-C  [phone] 78M PMH HTN, DM , HLD, BPH presenting with a fall causing shoulder dislocation, found to be in new onset afib with RVR.    New onset Afib with RVR  -Currently in Afib with rates 90-110s  -Check TSH  -f/u TTE  -Continue with metoprolol 50mg bid for rate control, may need to adjust dose after DANNY/DCCV  -[id number]–VASc score 4; c/w A/C with lovenox, plan to start Eliquis if TTE normal   -monitor on tele  -Keep NPO for DANNY/Cardioversion    ADDENDUM (1/17/19 AT 9:19pm): Pt is s.p DANNY/DCCV with SB. Metoprolol decreased to 25mg BID. A/C transitioned to Eliquis 5mg BID. Apical varian hypertrophy noted, will obtain cMRI r/o scar and/or aneurysm. Please call house cardiology (can be done in am). Plan discussed with medicine JASON.     FERNANDA Oliva NPMarilynC  [phone]

## 2019-01-18 LAB
ANION GAP SERPL CALC-SCNC: 13 MMOL/L — SIGNIFICANT CHANGE UP (ref 5–17)
BASOPHILS # BLD AUTO: 0.01 K/UL — SIGNIFICANT CHANGE UP (ref 0–0.2)
BASOPHILS NFR BLD AUTO: 0.2 % — SIGNIFICANT CHANGE UP (ref 0–2)
BUN SERPL-MCNC: 23 MG/DL — SIGNIFICANT CHANGE UP (ref 7–23)
CALCIUM SERPL-MCNC: 9 MG/DL — SIGNIFICANT CHANGE UP (ref 8.4–10.5)
CHLORIDE SERPL-SCNC: 105 MMOL/L — SIGNIFICANT CHANGE UP (ref 96–108)
CO2 SERPL-SCNC: 22 MMOL/L — SIGNIFICANT CHANGE UP (ref 22–31)
CREAT SERPL-MCNC: 1.18 MG/DL — SIGNIFICANT CHANGE UP (ref 0.5–1.3)
EOSINOPHIL # BLD AUTO: 0.12 K/UL — SIGNIFICANT CHANGE UP (ref 0–0.5)
EOSINOPHIL NFR BLD AUTO: 2.2 % — SIGNIFICANT CHANGE UP (ref 0–6)
GLUCOSE BLDC GLUCOMTR-MCNC: 121 MG/DL — HIGH (ref 70–99)
GLUCOSE BLDC GLUCOMTR-MCNC: 131 MG/DL — HIGH (ref 70–99)
GLUCOSE BLDC GLUCOMTR-MCNC: 167 MG/DL — HIGH (ref 70–99)
GLUCOSE BLDC GLUCOMTR-MCNC: 262 MG/DL — HIGH (ref 70–99)
GLUCOSE SERPL-MCNC: 103 MG/DL — HIGH (ref 70–99)
HCT VFR BLD CALC: 42.5 % — SIGNIFICANT CHANGE UP (ref 39–50)
HGB BLD-MCNC: 13.7 G/DL — SIGNIFICANT CHANGE UP (ref 13–17)
IMM GRANULOCYTES NFR BLD AUTO: 0.4 % — SIGNIFICANT CHANGE UP (ref 0–1.5)
LYMPHOCYTES # BLD AUTO: 0.77 K/UL — LOW (ref 1–3.3)
LYMPHOCYTES # BLD AUTO: 13.9 % — SIGNIFICANT CHANGE UP (ref 13–44)
MAGNESIUM SERPL-MCNC: 2 MG/DL — SIGNIFICANT CHANGE UP (ref 1.6–2.6)
MCHC RBC-ENTMCNC: 30.8 PG — SIGNIFICANT CHANGE UP (ref 27–34)
MCHC RBC-ENTMCNC: 32.2 GM/DL — SIGNIFICANT CHANGE UP (ref 32–36)
MCV RBC AUTO: 95.5 FL — SIGNIFICANT CHANGE UP (ref 80–100)
MONOCYTES # BLD AUTO: 0.62 K/UL — SIGNIFICANT CHANGE UP (ref 0–0.9)
MONOCYTES NFR BLD AUTO: 11.2 % — SIGNIFICANT CHANGE UP (ref 2–14)
NEUTROPHILS # BLD AUTO: 3.98 K/UL — SIGNIFICANT CHANGE UP (ref 1.8–7.4)
NEUTROPHILS NFR BLD AUTO: 72.1 % — SIGNIFICANT CHANGE UP (ref 43–77)
PHOSPHATE SERPL-MCNC: 3.1 MG/DL — SIGNIFICANT CHANGE UP (ref 2.5–4.5)
PLATELET # BLD AUTO: 175 K/UL — SIGNIFICANT CHANGE UP (ref 150–400)
POTASSIUM SERPL-MCNC: 3.7 MMOL/L — SIGNIFICANT CHANGE UP (ref 3.5–5.3)
POTASSIUM SERPL-SCNC: 3.7 MMOL/L — SIGNIFICANT CHANGE UP (ref 3.5–5.3)
RBC # BLD: 4.45 M/UL — SIGNIFICANT CHANGE UP (ref 4.2–5.8)
RBC # FLD: 13.7 % — SIGNIFICANT CHANGE UP (ref 10.3–14.5)
SODIUM SERPL-SCNC: 140 MMOL/L — SIGNIFICANT CHANGE UP (ref 135–145)
WBC # BLD: 5.52 K/UL — SIGNIFICANT CHANGE UP (ref 3.8–10.5)
WBC # FLD AUTO: 5.52 K/UL — SIGNIFICANT CHANGE UP (ref 3.8–10.5)

## 2019-01-18 PROCEDURE — 99233 SBSQ HOSP IP/OBS HIGH 50: CPT

## 2019-01-18 PROCEDURE — 75561 CARDIAC MRI FOR MORPH W/DYE: CPT | Mod: 26

## 2019-01-18 PROCEDURE — 99232 SBSQ HOSP IP/OBS MODERATE 35: CPT

## 2019-01-18 RX ORDER — POTASSIUM CHLORIDE 20 MEQ
40 PACKET (EA) ORAL ONCE
Qty: 0 | Refills: 0 | Status: COMPLETED | OUTPATIENT
Start: 2019-01-18 | End: 2019-01-18

## 2019-01-18 RX ORDER — SOTALOL HCL 120 MG
80 TABLET ORAL
Qty: 0 | Refills: 0 | Status: DISCONTINUED | OUTPATIENT
Start: 2019-01-18 | End: 2019-01-20

## 2019-01-18 RX ADMIN — Medication 1: at 21:27

## 2019-01-18 RX ADMIN — AMLODIPINE BESYLATE 10 MILLIGRAM(S): 2.5 TABLET ORAL at 06:51

## 2019-01-18 RX ADMIN — APIXABAN 5 MILLIGRAM(S): 2.5 TABLET, FILM COATED ORAL at 09:23

## 2019-01-18 RX ADMIN — Medication 1 MILLIGRAM(S): at 21:20

## 2019-01-18 RX ADMIN — CELECOXIB 100 MILLIGRAM(S): 200 CAPSULE ORAL at 22:00

## 2019-01-18 RX ADMIN — LISINOPRIL 40 MILLIGRAM(S): 2.5 TABLET ORAL at 06:51

## 2019-01-18 RX ADMIN — Medication 25 MILLIGRAM(S): at 06:51

## 2019-01-18 RX ADMIN — Medication 40 MILLIEQUIVALENT(S): at 23:55

## 2019-01-18 RX ADMIN — Medication 25 MILLIGRAM(S): at 18:30

## 2019-01-18 RX ADMIN — CELECOXIB 100 MILLIGRAM(S): 200 CAPSULE ORAL at 21:21

## 2019-01-18 RX ADMIN — APIXABAN 5 MILLIGRAM(S): 2.5 TABLET, FILM COATED ORAL at 21:20

## 2019-01-18 NOTE — PROGRESS NOTE ADULT - ASSESSMENT
78M PMH HTN, DM , HLD, BPH presenting with a fall causing shoulder dislocation, found to be in new onset afib with RVR. Now s/p successful DANNY/DCCV on 1/17/19.    #New onset Afib with RVR  -Currently sinus alia 50-70's with 40's during sleep hour  -Continue with metoprolol 25 mg bid  -[id number]–VASc score 4; c/w Eliquis 5 mg BID    #Fall  -Uncertain etiology of fall; syncope vs mechanical fall. Although patient denies LOC but he stated "does not remember how he fell"  -DANNY noted with apical varian hypertrophy, pending cMRI r/o scar and/or aneurysm.   -Possible ILR vs ICD pending cMRI    FERNANDA Benedict-Luciana NP-C  [id number] 78M PMH HTN, DM , HLD, BPH presenting with a fall causing shoulder dislocation, found to be in new onset afib with RVR. Now s/p successful DANNY/DCCV on 1/17/19.    #New onset Afib with RVR  -Currently sinus alia 50-70's with 40's during sleep hour  -Continue with metoprolol 25 mg bid  -[id number]–VASc score 4; c/w Eliquis 5 mg BID    #Fall  -Uncertain etiology of fall; syncope vs mechanical fall. Although patient denies LOC but he stated "does not remember how he fell"  -DANNY noted with apical varian hypertrophy, pending cMRI r/o scar and/or aneurysm.   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 Please hold Eliquis Saturday night. Patient converted back to Afib with rates 130s despite DANNY/DCCV. Will start Sotalol 80mg BID in am and d/c metoprolol. f/u QTc.     S. aHzel, NP-C  [phone]

## 2019-01-19 LAB
ANION GAP SERPL CALC-SCNC: 14 MMOL/L — SIGNIFICANT CHANGE UP (ref 5–17)
BLD GP AB SCN SERPL QL: NEGATIVE — SIGNIFICANT CHANGE UP
BUN SERPL-MCNC: 30 MG/DL — HIGH (ref 7–23)
CALCIUM SERPL-MCNC: 9.1 MG/DL — SIGNIFICANT CHANGE UP (ref 8.4–10.5)
CHLORIDE SERPL-SCNC: 108 MMOL/L — SIGNIFICANT CHANGE UP (ref 96–108)
CO2 SERPL-SCNC: 22 MMOL/L — SIGNIFICANT CHANGE UP (ref 22–31)
CREAT SERPL-MCNC: 1.09 MG/DL — SIGNIFICANT CHANGE UP (ref 0.5–1.3)
GLUCOSE BLDC GLUCOMTR-MCNC: 119 MG/DL — HIGH (ref 70–99)
GLUCOSE BLDC GLUCOMTR-MCNC: 135 MG/DL — HIGH (ref 70–99)
GLUCOSE BLDC GLUCOMTR-MCNC: 189 MG/DL — HIGH (ref 70–99)
GLUCOSE BLDC GLUCOMTR-MCNC: 223 MG/DL — HIGH (ref 70–99)
GLUCOSE SERPL-MCNC: 143 MG/DL — HIGH (ref 70–99)
HCT VFR BLD CALC: 43.2 % — SIGNIFICANT CHANGE UP (ref 39–50)
HGB BLD-MCNC: 14.3 G/DL — SIGNIFICANT CHANGE UP (ref 13–17)
MCHC RBC-ENTMCNC: 31.4 PG — SIGNIFICANT CHANGE UP (ref 27–34)
MCHC RBC-ENTMCNC: 33.1 GM/DL — SIGNIFICANT CHANGE UP (ref 32–36)
MCV RBC AUTO: 94.9 FL — SIGNIFICANT CHANGE UP (ref 80–100)
PLATELET # BLD AUTO: 163 K/UL — SIGNIFICANT CHANGE UP (ref 150–400)
POTASSIUM SERPL-MCNC: 4 MMOL/L — SIGNIFICANT CHANGE UP (ref 3.5–5.3)
POTASSIUM SERPL-SCNC: 4 MMOL/L — SIGNIFICANT CHANGE UP (ref 3.5–5.3)
RBC # BLD: 4.55 M/UL — SIGNIFICANT CHANGE UP (ref 4.2–5.8)
RBC # FLD: 13.6 % — SIGNIFICANT CHANGE UP (ref 10.3–14.5)
RH IG SCN BLD-IMP: POSITIVE — SIGNIFICANT CHANGE UP
SODIUM SERPL-SCNC: 144 MMOL/L — SIGNIFICANT CHANGE UP (ref 135–145)
WBC # BLD: 4.49 K/UL — SIGNIFICANT CHANGE UP (ref 3.8–10.5)
WBC # FLD AUTO: 4.49 K/UL — SIGNIFICANT CHANGE UP (ref 3.8–10.5)

## 2019-01-19 PROCEDURE — 99232 SBSQ HOSP IP/OBS MODERATE 35: CPT

## 2019-01-19 PROCEDURE — 93010 ELECTROCARDIOGRAM REPORT: CPT

## 2019-01-19 RX ORDER — MAGNESIUM OXIDE 400 MG ORAL TABLET 241.3 MG
800 TABLET ORAL ONCE
Qty: 0 | Refills: 0 | Status: COMPLETED | OUTPATIENT
Start: 2019-01-19 | End: 2019-01-19

## 2019-01-19 RX ADMIN — Medication 1 MILLIGRAM(S): at 22:01

## 2019-01-19 RX ADMIN — MAGNESIUM OXIDE 400 MG ORAL TABLET 800 MILLIGRAM(S): 241.3 TABLET ORAL at 02:34

## 2019-01-19 RX ADMIN — Medication 80 MILLIGRAM(S): at 06:21

## 2019-01-19 RX ADMIN — APIXABAN 5 MILLIGRAM(S): 2.5 TABLET, FILM COATED ORAL at 11:04

## 2019-01-19 RX ADMIN — Medication 80 MILLIGRAM(S): at 18:29

## 2019-01-19 RX ADMIN — LISINOPRIL 40 MILLIGRAM(S): 2.5 TABLET ORAL at 06:21

## 2019-01-19 RX ADMIN — AMLODIPINE BESYLATE 10 MILLIGRAM(S): 2.5 TABLET ORAL at 06:21

## 2019-01-19 RX ADMIN — Medication 1: at 12:19

## 2019-01-19 NOTE — PROGRESS NOTE ADULT - ASSESSMENT
Patient is a 78 year old male with past medical history of HTN, DM , HLD, BPH presenting with a fall causing shoulder dislocation, found to be in new onset afib with RVR.  s/p successful DANNY/DCCV on 1/17/19.    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 Keep NPO p MN tonight   - Please hold Eliquis Saturday night and Sunday Am dose.     [phone]

## 2019-01-20 ENCOUNTER — TRANSCRIPTION ENCOUNTER (OUTPATIENT)
Age: 79
End: 2019-01-20

## 2019-01-20 LAB
ANION GAP SERPL CALC-SCNC: 12 MMOL/L — SIGNIFICANT CHANGE UP (ref 5–17)
BUN SERPL-MCNC: 30 MG/DL — HIGH (ref 7–23)
CALCIUM SERPL-MCNC: 9.1 MG/DL — SIGNIFICANT CHANGE UP (ref 8.4–10.5)
CHLORIDE SERPL-SCNC: 108 MMOL/L — SIGNIFICANT CHANGE UP (ref 96–108)
CO2 SERPL-SCNC: 22 MMOL/L — SIGNIFICANT CHANGE UP (ref 22–31)
CREAT SERPL-MCNC: 1.06 MG/DL — SIGNIFICANT CHANGE UP (ref 0.5–1.3)
GLUCOSE BLDC GLUCOMTR-MCNC: 165 MG/DL — HIGH (ref 70–99)
GLUCOSE BLDC GLUCOMTR-MCNC: 173 MG/DL — HIGH (ref 70–99)
GLUCOSE BLDC GLUCOMTR-MCNC: 193 MG/DL — HIGH (ref 70–99)
GLUCOSE BLDC GLUCOMTR-MCNC: 226 MG/DL — HIGH (ref 70–99)
GLUCOSE SERPL-MCNC: 157 MG/DL — HIGH (ref 70–99)
HCT VFR BLD CALC: 43.9 % — SIGNIFICANT CHANGE UP (ref 39–50)
HGB BLD-MCNC: 14.2 G/DL — SIGNIFICANT CHANGE UP (ref 13–17)
INR BLD: 1.05 RATIO — SIGNIFICANT CHANGE UP (ref 0.88–1.16)
MAGNESIUM SERPL-MCNC: 2.1 MG/DL — SIGNIFICANT CHANGE UP (ref 1.6–2.6)
MCHC RBC-ENTMCNC: 30.7 PG — SIGNIFICANT CHANGE UP (ref 27–34)
MCHC RBC-ENTMCNC: 32.3 GM/DL — SIGNIFICANT CHANGE UP (ref 32–36)
MCV RBC AUTO: 94.8 FL — SIGNIFICANT CHANGE UP (ref 80–100)
PHOSPHATE SERPL-MCNC: 3.4 MG/DL — SIGNIFICANT CHANGE UP (ref 2.5–4.5)
PLATELET # BLD AUTO: 163 K/UL — SIGNIFICANT CHANGE UP (ref 150–400)
POTASSIUM SERPL-MCNC: 3.8 MMOL/L — SIGNIFICANT CHANGE UP (ref 3.5–5.3)
POTASSIUM SERPL-SCNC: 3.8 MMOL/L — SIGNIFICANT CHANGE UP (ref 3.5–5.3)
PROTHROM AB SERPL-ACNC: 11.8 SEC — SIGNIFICANT CHANGE UP (ref 10–13.1)
RBC # BLD: 4.63 M/UL — SIGNIFICANT CHANGE UP (ref 4.2–5.8)
RBC # FLD: 13.4 % — SIGNIFICANT CHANGE UP (ref 10.3–14.5)
SODIUM SERPL-SCNC: 142 MMOL/L — SIGNIFICANT CHANGE UP (ref 135–145)
WBC # BLD: 4.11 K/UL — SIGNIFICANT CHANGE UP (ref 3.8–10.5)
WBC # FLD AUTO: 4.11 K/UL — SIGNIFICANT CHANGE UP (ref 3.8–10.5)

## 2019-01-20 PROCEDURE — 71045 X-RAY EXAM CHEST 1 VIEW: CPT | Mod: 26

## 2019-01-20 PROCEDURE — 33249 INSJ/RPLCMT DEFIB W/LEAD(S): CPT | Mod: 59

## 2019-01-20 PROCEDURE — 99232 SBSQ HOSP IP/OBS MODERATE 35: CPT

## 2019-01-20 PROCEDURE — 93010 ELECTROCARDIOGRAM REPORT: CPT

## 2019-01-20 RX ORDER — SOTALOL HCL 120 MG
120 TABLET ORAL
Qty: 0 | Refills: 0 | Status: DISCONTINUED | OUTPATIENT
Start: 2019-01-20 | End: 2019-01-21

## 2019-01-20 RX ORDER — APIXABAN 2.5 MG/1
1 TABLET, FILM COATED ORAL
Qty: 60 | Refills: 0 | OUTPATIENT
Start: 2019-01-20 | End: 2019-02-18

## 2019-01-20 RX ORDER — CEFAZOLIN SODIUM 1 G
1000 VIAL (EA) INJECTION EVERY 8 HOURS
Qty: 0 | Refills: 0 | Status: COMPLETED | OUTPATIENT
Start: 2019-01-20 | End: 2019-01-21

## 2019-01-20 RX ORDER — CEFAZOLIN SODIUM 1 G
1000 VIAL (EA) INJECTION ONCE
Qty: 0 | Refills: 0 | Status: DISCONTINUED | OUTPATIENT
Start: 2019-01-20 | End: 2019-01-20

## 2019-01-20 RX ORDER — APIXABAN 2.5 MG/1
5 TABLET, FILM COATED ORAL EVERY 12 HOURS
Qty: 0 | Refills: 0 | Status: DISCONTINUED | OUTPATIENT
Start: 2019-01-22 | End: 2019-01-21

## 2019-01-20 RX ADMIN — AMLODIPINE BESYLATE 10 MILLIGRAM(S): 2.5 TABLET ORAL at 06:24

## 2019-01-20 RX ADMIN — CELECOXIB 100 MILLIGRAM(S): 200 CAPSULE ORAL at 22:30

## 2019-01-20 RX ADMIN — Medication 1: at 13:37

## 2019-01-20 RX ADMIN — CELECOXIB 100 MILLIGRAM(S): 200 CAPSULE ORAL at 21:32

## 2019-01-20 RX ADMIN — Medication 80 MILLIGRAM(S): at 06:24

## 2019-01-20 RX ADMIN — Medication 100 MILLIGRAM(S): at 19:42

## 2019-01-20 RX ADMIN — Medication 1 MILLIGRAM(S): at 21:32

## 2019-01-20 RX ADMIN — Medication 2: at 16:51

## 2019-01-20 RX ADMIN — Medication 120 MILLIGRAM(S): at 18:32

## 2019-01-20 RX ADMIN — LISINOPRIL 40 MILLIGRAM(S): 2.5 TABLET ORAL at 06:24

## 2019-01-20 NOTE — PROGRESS NOTE ADULT - PROBLEM SELECTOR PLAN 1
new onset , asymptomatic at this time , s/p full dose Lovenox , Cardiology eval noted  TTE ordered  Check TFTs as well
new onset , asymptomatic at this time , s/p full dose Lovenox , Cardiology FU noted  apical variant hypertrophic CMP noted. EP to FU
new onset , asymptomatic at this time , s/p full dose Lovenox , Cardiology eval noted  apical variant hypertrophic CMP noted. EP to FU
new onset , asymptomatic at this time , s/p full dose Lovenox , Cardiology FU noted  apical variant hypertrophic CMP noted. EP FU noted. start Eliquis as recommended.

## 2019-01-20 NOTE — DISCHARGE NOTE ADULT - NS AS ACTIVITY OBS
Walking-Indoors allowed/No Heavy lifting/straining/Walking-Outdoors allowed/Bathing allowed/Showering allowed/Driving allowed

## 2019-01-20 NOTE — DISCHARGE NOTE ADULT - CARE PLAN
Principal Discharge DX:	Atrial fibrillation  Goal:	Prevent worsening heart disease  Assessment and plan of treatment:	Atrial fibrillation is the most common heart rhythm problem & has the risk of stroke & heart attack  It helps if you control your blood pressure, not drink more than 1-2 alcohol drinks per day, cut down on caffeine, getting treatment for over active thyroid gland, & getting exercise  Call your doctor if you feel your heart racing or beating unusually, chest tightness or pain, lightheaded, faint, shortness of breath especially with exercise  It is important to take your heart medication as prescribed  You are on anticoagulation with Eliquis which is very important to take as directed.   Eliquis/Apixaban is used to thin the blood so clots will not form and to keep existing ones from getting bigger.  Take this medication daily as prescribed by your health care provider.  Take this medication with food to prevent upset stomach.  If you miss a dose call your health care provider or pharmacist right away.  Tell your doctor you use this drug before you have a spinal or epidural procedure  Tell dentists, surgeon, and other doctors that you use this drug.  You may bleed more easily.  Be careful and avoid injury.  Use a soft toothbrush and an electric razor.  Secondary Diagnosis:	Shoulder dislocation  Secondary Diagnosis:	HTN (hypertension)  Assessment and plan of treatment:	You have high blood pressure which puts you at risk for heart attack and stroke.   Take all blood pressure medication as prescribed by your doctor.   Follow up with your medical doctor for routine blood pressure monitoring at your next visit.  Notify your doctor if you have any of the following symptoms:   Dizziness, Lightheadedness, Blurry vision, Headache, Chest pain, Shortness of breath.   Continue to follow a low salt and low cholesterol diet with limited added salt. Avoid fatty foods such as cured meats - turner, ham, prosciutto, fried foods, canned soups/broths, and frozen dinners.  Secondary Diagnosis:	HLD (hyperlipidemia)  Assessment and plan of treatment:	Continue with your cholesterol medications. Eat a heart healthy diet that is low in saturated fats and salt, and includes whole grains, fruits, vegetables and lean protein; exercise regularly (consult with your physician or cardiologist first); maintain a heart healthy weight; if you smoke - quit (A resource to help you stop smoking is the [facility] – phone number [phone].). Continue to follow with your primary physician or cardiologist.  Seek medical help for dizziness, Lightheadedness, Blurry vision, Headache, Chest pain, Shortness of breath  Secondary Diagnosis:	Diabetes  Assessment and plan of treatment:	When you have diabetes, a way to monitor your blood sugars is checking your hemoglobin A1c level. A level greater than 6.5 indicates diabetes, however may be okay when you are taking medications. HgA1C this admission: The standard goal for adults being treated for diabetes is between 6.5 - 7.0% to minimize risk of diabetes related complications.  Make sure you get your HgA1c checked every three months.  While taking oral diabetes medications, check your blood glucose two times a day.  If you take insulin, check your blood glucose before meals and at bedtime.  It's important not to skip any meals or you risk your blood sugar level dropping too low.   Low blood sugar (hypoglycemia) is a blood sugar below 70mg/dl. Check your blood sugar if you feel signs/symptoms of hypoglycemia. If your blood sugar is below 70 take 15 grams of carbohydrates (ex 4 oz of apple juice, 3-4 glucose tablets, or 4-6 oz of regular soda) wait 15 minutes and repeat blood sugar to make sure it comes up above 70.  If your blood sugar is above 70 and you are due for a meal, have a meal.  If you are not due for a meal have a snack.  This snack helps keeps your blood sugar at a safe range.  Keep a log of your blood glucose results and always take it with you to your doctor appointments.  Keep a list of your current medications including injectables and over the counter medications and bring this medication list with you to all your doctor appointments.  If you have not seen your ophthalmologist this year call for appointment. You should see your eye doctor annually to avoid any eye complications from your diabetes.  Check your feet daily for redness, sores, or openings. Do not self treat any sores or ulcers you notice. If there is no improvement in two days of any lesions, call your primary care physician for an appointment. It is recommended that you follow up with a podiatrist regularly to avoid any vascular complications of your diabetes.

## 2019-01-20 NOTE — PROGRESS NOTE ADULT - PROBLEM SELECTOR PLAN 4
-Hold oral hypoglycemics  -Start HISS, check fsg qac/qhs    -consistent carb diet

## 2019-01-20 NOTE — PROGRESS NOTE ADULT - PROBLEM SELECTOR PLAN 3
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

## 2019-01-20 NOTE — PROGRESS NOTE ADULT - PROBLEM SELECTOR PLAN 5
- quinapril therapeutic equivalent  - lopressor

## 2019-01-20 NOTE — DISCHARGE NOTE ADULT - INSTRUCTIONS
No fluid restrictions. Follow low sodium/salt and low cholesterol/fat diet. Avoid added salt, frozen dinners, canned soups and broths, foods fried in oil, salted/cured meats including ham, turner, and other deli meats high in sodium. Eat plenty of fruits, vegetables and whole grains. Call and make follow up appointment with Primary Care Provider, Cardiologist after discharge. Check  left chest wall for hardness, swelling, drainage or if any fever notify MD. Return to the nearest emergency room or call 911 for worsening symptoms of dizziness, or falls.

## 2019-01-20 NOTE — PROGRESS NOTE ADULT - PROBLEM SELECTOR PLAN 2
s/p reduction   no symptoms at this time

## 2019-01-20 NOTE — DISCHARGE NOTE ADULT - HOSPITAL COURSE
Patient is a 78 year old male with past medical history of HTN, DM , HLD, BPH presenting with a fall causing shoulder dislocation, found to be in new onset afib with RVR.  s/p successful DANNY/DCCV on 1/17/19.    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

## 2019-01-20 NOTE — DISCHARGE NOTE ADULT - PLAN OF CARE
Prevent worsening heart disease Atrial fibrillation is the most common heart rhythm problem & has the risk of stroke & heart attack  It helps if you control your blood pressure, not drink more than 1-2 alcohol drinks per day, cut down on caffeine, getting treatment for over active thyroid gland, & getting exercise  Call your doctor if you feel your heart racing or beating unusually, chest tightness or pain, lightheaded, faint, shortness of breath especially with exercise  It is important to take your heart medication as prescribed  You are on anticoagulation with Eliquis which is very important to take as directed.   Eliquis/Apixaban is used to thin the blood so clots will not form and to keep existing ones from getting bigger.  Take this medication daily as prescribed by your health care provider.  Take this medication with food to prevent upset stomach.  If you miss a dose call your health care provider or pharmacist right away.  Tell your doctor you use this drug before you have a spinal or epidural procedure  Tell dentists, surgeon, and other doctors that you use this drug.  You may bleed more easily.  Be careful and avoid injury.  Use a soft toothbrush and an electric razor. You have high blood pressure which puts you at risk for heart attack and stroke.   Take all blood pressure medication as prescribed by your doctor.   Follow up with your medical doctor for routine blood pressure monitoring at your next visit.  Notify your doctor if you have any of the following symptoms:   Dizziness, Lightheadedness, Blurry vision, Headache, Chest pain, Shortness of breath.   Continue to follow a low salt and low cholesterol diet with limited added salt. Avoid fatty foods such as cured meats - turner, ham, prosciutto, fried foods, canned soups/broths, and frozen dinners. Continue with your cholesterol medications. Eat a heart healthy diet that is low in saturated fats and salt, and includes whole grains, fruits, vegetables and lean protein; exercise regularly (consult with your physician or cardiologist first); maintain a heart healthy weight; if you smoke - quit (A resource to help you stop smoking is the [facility] – phone number 624-247-1282.). Continue to follow with your primary physician or cardiologist.  Seek medical help for dizziness, Lightheadedness, Blurry vision, Headache, Chest pain, Shortness of breath When you have diabetes, a way to monitor your blood sugars is checking your hemoglobin A1c level. A level greater than 6.5 indicates diabetes, however may be okay when you are taking medications. HgA1C this admission: The standard goal for adults being treated for diabetes is between 6.5 - 7.0% to minimize risk of diabetes related complications.  Make sure you get your HgA1c checked every three months.  While taking oral diabetes medications, check your blood glucose two times a day.  If you take insulin, check your blood glucose before meals and at bedtime.  It's important not to skip any meals or you risk your blood sugar level dropping too low.   Low blood sugar (hypoglycemia) is a blood sugar below 70mg/dl. Check your blood sugar if you feel signs/symptoms of hypoglycemia. If your blood sugar is below 70 take 15 grams of carbohydrates (ex 4 oz of apple juice, 3-4 glucose tablets, or 4-6 oz of regular soda) wait 15 minutes and repeat blood sugar to make sure it comes up above 70.  If your blood sugar is above 70 and you are due for a meal, have a meal.  If you are not due for a meal have a snack.  This snack helps keeps your blood sugar at a safe range.  Keep a log of your blood glucose results and always take it with you to your doctor appointments.  Keep a list of your current medications including injectables and over the counter medications and bring this medication list with you to all your doctor appointments.  If you have not seen your ophthalmologist this year call for appointment. You should see your eye doctor annually to avoid any eye complications from your diabetes.  Check your feet daily for redness, sores, or openings. Do not self treat any sores or ulcers you notice. If there is no improvement in two days of any lesions, call your primary care physician for an appointment. It is recommended that you follow up with a podiatrist regularly to avoid any vascular complications of your diabetes.

## 2019-01-20 NOTE — DISCHARGE NOTE ADULT - PATIENT PORTAL LINK FT
You can access the [facility] Patient Portal, offered by [facility], by registering with the following website: [email or web address]

## 2019-01-20 NOTE — CHART NOTE - NSCHARTNOTEFT_GEN_A_CORE
BRIEF PROCEDURE NOTE    BETH MAYFIELD  [id number]    Pre-op Diagnosis: HCM, Syncope, AF, NSVT    Post-op Diagnosis: same    Procedure: Dual chamber ICD/CV    Electrophysiologist: Gaby Lopez MD    Assistant: none    Anesthesia: IV sedation/local    Access: left cephalic/axillary    Description:  Left cephalic cut down  ICD lead to RVA  Axillary puncture  RA lead to RAA  Dual Chamber [place] ICD  After sedation CV (200J) to NSR    Complications: none    EBL: 20    Disposition: recovered in room, to floor/stable    Plan:  Stat CXR  PA/LAT in AM  Ancef 1gm q8 x2  ECG  increase sotolol 120BID  Start eliquis 5mg BID tuesday AM  No heparin/lovinox  follow-up device clinic 10-14days

## 2019-01-20 NOTE — DISCHARGE NOTE ADULT - CARE PROVIDER_API CALL
Gaby Lopez (MD), Cardiac Electrophysiology; Cardiovascular Disease; Internal Medicine  [address] [place] [address]  Phone: [phone]  Fax: [phone]

## 2019-01-20 NOTE — DISCHARGE NOTE ADULT - MEDICATION SUMMARY - MEDICATIONS TO TAKE
I will START or STAY ON the medications listed below when I get home from the hospital:    CeleBREX 100 mg oral capsule  -- 1 cap(s) by mouth 2 times a day  -- Indication: For pain    quinapril 40 mg oral tablet  -- 1 tab(s) by mouth once a day  -- Indication: For HTN (hypertension)    sotalol 120 mg oral tablet  -- 1 tab(s) by mouth 2 times a day  -- Indication: For Atrial fibrillation    apixaban 5 mg oral tablet  -- 1 tab(s) by mouth every 12 hours  -- Indication: For Atrial fibrillation    LORazepam 1 mg oral tablet  -- 1 tab(s) by mouth once a day (at bedtime)  -- Indication: For Anxiety    glyburide-metformin 5 mg-500 mg oral tablet  -- 1 tab(s) by mouth 2 times a day  -- Indication: For Diabetes    Actos 15 mg oral tablet  -- 1 tab(s) by mouth once a day  -- Indication: For Diabetes    simvastatin 10 mg oral tablet  -- 1 tab(s) by mouth once a day (at bedtime)  -- Indication: For HLD (hyperlipidemia)    amLODIPine 10 mg oral tablet  -- 1 tab(s) by mouth once a day  -- Indication: For HTN (hypertension)

## 2019-01-21 VITALS
DIASTOLIC BLOOD PRESSURE: 83 MMHG | OXYGEN SATURATION: 97 % | HEART RATE: 61 BPM | RESPIRATION RATE: 18 BRPM | TEMPERATURE: 98 F | SYSTOLIC BLOOD PRESSURE: 151 MMHG

## 2019-01-21 LAB
ANION GAP SERPL CALC-SCNC: 12 MMOL/L — SIGNIFICANT CHANGE UP (ref 5–17)
BUN SERPL-MCNC: 29 MG/DL — HIGH (ref 7–23)
CALCIUM SERPL-MCNC: 9 MG/DL — SIGNIFICANT CHANGE UP (ref 8.4–10.5)
CHLORIDE SERPL-SCNC: 103 MMOL/L — SIGNIFICANT CHANGE UP (ref 96–108)
CO2 SERPL-SCNC: 23 MMOL/L — SIGNIFICANT CHANGE UP (ref 22–31)
CREAT SERPL-MCNC: 1.03 MG/DL — SIGNIFICANT CHANGE UP (ref 0.5–1.3)
GLUCOSE BLDC GLUCOMTR-MCNC: 171 MG/DL — HIGH (ref 70–99)
GLUCOSE BLDC GLUCOMTR-MCNC: 199 MG/DL — HIGH (ref 70–99)
GLUCOSE SERPL-MCNC: 165 MG/DL — HIGH (ref 70–99)
HCT VFR BLD CALC: 39.9 % — SIGNIFICANT CHANGE UP (ref 39–50)
HGB BLD-MCNC: 13.3 G/DL — SIGNIFICANT CHANGE UP (ref 13–17)
MCHC RBC-ENTMCNC: 30.8 PG — SIGNIFICANT CHANGE UP (ref 27–34)
MCHC RBC-ENTMCNC: 33.3 GM/DL — SIGNIFICANT CHANGE UP (ref 32–36)
MCV RBC AUTO: 92.4 FL — SIGNIFICANT CHANGE UP (ref 80–100)
PLATELET # BLD AUTO: 147 K/UL — LOW (ref 150–400)
POTASSIUM SERPL-MCNC: 3.8 MMOL/L — SIGNIFICANT CHANGE UP (ref 3.5–5.3)
POTASSIUM SERPL-SCNC: 3.8 MMOL/L — SIGNIFICANT CHANGE UP (ref 3.5–5.3)
RBC # BLD: 4.32 M/UL — SIGNIFICANT CHANGE UP (ref 4.2–5.8)
RBC # FLD: 13.4 % — SIGNIFICANT CHANGE UP (ref 10.3–14.5)
SODIUM SERPL-SCNC: 138 MMOL/L — SIGNIFICANT CHANGE UP (ref 135–145)
WBC # BLD: 4.92 K/UL — SIGNIFICANT CHANGE UP (ref 3.8–10.5)
WBC # FLD AUTO: 4.92 K/UL — SIGNIFICANT CHANGE UP (ref 3.8–10.5)

## 2019-01-21 PROCEDURE — 73030 X-RAY EXAM OF SHOULDER: CPT

## 2019-01-21 PROCEDURE — 85610 PROTHROMBIN TIME: CPT

## 2019-01-21 PROCEDURE — 86900 BLOOD TYPING SEROLOGIC ABO: CPT

## 2019-01-21 PROCEDURE — 99232 SBSQ HOSP IP/OBS MODERATE 35: CPT

## 2019-01-21 PROCEDURE — 80048 BASIC METABOLIC PNL TOTAL CA: CPT

## 2019-01-21 PROCEDURE — 92960 CARDIOVERSION ELECTRIC EXT: CPT

## 2019-01-21 PROCEDURE — 33249 INSJ/RPLCMT DEFIB W/LEAD(S): CPT

## 2019-01-21 PROCEDURE — 96375 TX/PRO/DX INJ NEW DRUG ADDON: CPT

## 2019-01-21 PROCEDURE — 93306 TTE W/DOPPLER COMPLETE: CPT

## 2019-01-21 PROCEDURE — C1898: CPT

## 2019-01-21 PROCEDURE — 70450 CT HEAD/BRAIN W/O DYE: CPT

## 2019-01-21 PROCEDURE — 85730 THROMBOPLASTIN TIME PARTIAL: CPT

## 2019-01-21 PROCEDURE — 86850 RBC ANTIBODY SCREEN: CPT

## 2019-01-21 PROCEDURE — 70544 MR ANGIOGRAPHY HEAD W/O DYE: CPT

## 2019-01-21 PROCEDURE — 75561 CARDIAC MRI FOR MORPH W/DYE: CPT

## 2019-01-21 PROCEDURE — C1892: CPT

## 2019-01-21 PROCEDURE — 85027 COMPLETE CBC AUTOMATED: CPT

## 2019-01-21 PROCEDURE — 90715 TDAP VACCINE 7 YRS/> IM: CPT

## 2019-01-21 PROCEDURE — 84484 ASSAY OF TROPONIN QUANT: CPT

## 2019-01-21 PROCEDURE — 86901 BLOOD TYPING SEROLOGIC RH(D): CPT

## 2019-01-21 PROCEDURE — 83735 ASSAY OF MAGNESIUM: CPT

## 2019-01-21 PROCEDURE — 93320 DOPPLER ECHO COMPLETE: CPT

## 2019-01-21 PROCEDURE — C1769: CPT

## 2019-01-21 PROCEDURE — 93325 DOPPLER ECHO COLOR FLOW MAPG: CPT

## 2019-01-21 PROCEDURE — 82962 GLUCOSE BLOOD TEST: CPT

## 2019-01-21 PROCEDURE — 96374 THER/PROPH/DIAG INJ IV PUSH: CPT

## 2019-01-21 PROCEDURE — 80053 COMPREHEN METABOLIC PANEL: CPT

## 2019-01-21 PROCEDURE — C1777: CPT

## 2019-01-21 PROCEDURE — 73060 X-RAY EXAM OF HUMERUS: CPT

## 2019-01-21 PROCEDURE — C1721: CPT

## 2019-01-21 PROCEDURE — 93312 ECHO TRANSESOPHAGEAL: CPT

## 2019-01-21 PROCEDURE — 90471 IMMUNIZATION ADMIN: CPT

## 2019-01-21 PROCEDURE — 71045 X-RAY EXAM CHEST 1 VIEW: CPT | Mod: 26

## 2019-01-21 PROCEDURE — 71045 X-RAY EXAM CHEST 1 VIEW: CPT

## 2019-01-21 PROCEDURE — 73562 X-RAY EXAM OF KNEE 3: CPT

## 2019-01-21 PROCEDURE — 84100 ASSAY OF PHOSPHORUS: CPT

## 2019-01-21 PROCEDURE — 93005 ELECTROCARDIOGRAM TRACING: CPT

## 2019-01-21 PROCEDURE — 99285 EMERGENCY DEPT VISIT HI MDM: CPT | Mod: 25

## 2019-01-21 PROCEDURE — 83036 HEMOGLOBIN GLYCOSYLATED A1C: CPT

## 2019-01-21 RX ORDER — METOPROLOL TARTRATE 50 MG
1 TABLET ORAL
Qty: 0 | Refills: 0 | COMMUNITY

## 2019-01-21 RX ORDER — SIMVASTATIN 20 MG/1
1 TABLET, FILM COATED ORAL
Qty: 30 | Refills: 0 | OUTPATIENT
Start: 2019-01-21 | End: 2019-02-19

## 2019-01-21 RX ORDER — SIMVASTATIN 20 MG/1
1 TABLET, FILM COATED ORAL
Qty: 0 | Refills: 0 | COMMUNITY

## 2019-01-21 RX ORDER — SOTALOL HCL 120 MG
1 TABLET ORAL
Qty: 60 | Refills: 0 | OUTPATIENT
Start: 2019-01-21 | End: 2019-02-19

## 2019-01-21 RX ADMIN — Medication 120 MILLIGRAM(S): at 07:18

## 2019-01-21 RX ADMIN — Medication 1: at 12:15

## 2019-01-21 RX ADMIN — LISINOPRIL 40 MILLIGRAM(S): 2.5 TABLET ORAL at 07:17

## 2019-01-21 RX ADMIN — Medication 1: at 08:23

## 2019-01-21 RX ADMIN — Medication 100 MILLIGRAM(S): at 03:32

## 2019-01-21 RX ADMIN — AMLODIPINE BESYLATE 10 MILLIGRAM(S): 2.5 TABLET ORAL at 07:17

## 2019-01-21 NOTE — PROGRESS NOTE ADULT - REASON FOR ADMISSION
fall on day of admission
Syncope
fall on day of admission

## 2019-01-21 NOTE — PROGRESS NOTE ADULT - PROVIDER SPECIALTY LIST ADULT
Cardiology
Electrophysiology
Internal Medicine

## 2019-01-21 NOTE — PROGRESS NOTE ADULT - SUBJECTIVE AND OBJECTIVE BOX
Patient is a 78y old  Male who presents with a chief complaint of s/p fall on day of admission (17 Jan 2019 11:39)      SUBJECTIVE / OVERNIGHT EVENTS:  Assuming care of patient  No new symptoms reported  Review of Systems:   CONSTITUTIONAL: No fever, weight loss, or fatigue  EYES: No eye pain, visual disturbances, or discharge  ENMT:  No difficulty hearing, tinnitus, vertigo; No sinus or throat pain  NECK: No pain or stiffness  BREASTS: No pain, masses, or nipple discharge  RESPIRATORY: No cough, wheezing, chills or hemoptysis; No shortness of breath  CARDIOVASCULAR: No chest pain, palpitations, dizziness, or leg swelling  GASTROINTESTINAL: No abdominal or epigastric pain. No nausea, vomiting, or hematemesis; No diarrhea or constipation. No melena or hematochezia.  GENITOURINARY: No dysuria, frequency, hematuria, or incontinence  NEUROLOGICAL: No headaches, memory loss, loss of strength, numbness, or tremors  SKIN: No itching, burning, rashes, or lesions   LYMPH NODES: No enlarged glands  ENDOCRINE: No heat or cold intolerance; No hair loss  MUSCULOSKELETAL: No joint pain or swelling; No muscle, back, or extremity pain  PSYCHIATRIC: No depression, anxiety, mood swings, or difficulty sleeping  HEME/LYMPH: No easy bruising, or bleeding gums  ALLERY AND IMMUNOLOGIC: No hives or eczema    MEDICATIONS  (STANDING):  amLODIPine   Tablet 10 milliGRAM(s) Oral daily  dextrose 5%. 1000 milliLiter(s) (50 mL/Hr) IV Continuous <Continuous>  dextrose 50% Injectable 25 Gram(s) IV Push once  enoxaparin Injectable 90 milliGRAM(s) SubCutaneous every 12 hours  influenza   Vaccine 0.5 milliLiter(s) IntraMuscular once  insulin lispro (HumaLOG) corrective regimen sliding scale   SubCutaneous three times a day before meals  insulin lispro (HumaLOG) corrective regimen sliding scale   SubCutaneous at bedtime  lisinopril 40 milliGRAM(s) Oral daily  LORazepam     Tablet 1 milliGRAM(s) Oral at bedtime  metoprolol tartrate 50 milliGRAM(s) Oral two times a day  simvastatin 10 milliGRAM(s) Oral at bedtime    MEDICATIONS  (PRN):  acetaminophen   Tablet .. 650 milliGRAM(s) Oral every 6 hours PRN Mild Pain (1 - 3)  celecoxib 100 milliGRAM(s) Oral two times a day PRN Mild Pain (1 - 3)  dextrose 40% Gel 15 Gram(s) Oral once PRN Blood Glucose LESS THAN 70 milliGRAM(s)/deciliter  glucagon  Injectable 1 milliGRAM(s) IntraMuscular once PRN Glucose LESS THAN 70 milligrams/deciliter      PHYSICAL EXAM:  Vital Signs Last 24 Hrs  T(C): 36.8 (17 Jan 2019 08:11), Max: 36.9 (17 Jan 2019 06:11)  T(F): 98.2 (17 Jan 2019 08:11), Max: 98.4 (17 Jan 2019 06:11)  HR: 104 (17 Jan 2019 08:11) (96 - 121)  BP: 160/99 (17 Jan 2019 08:11) (148/96 - 172/83)  BP(mean): --  RR: 19 (17 Jan 2019 08:11) (18 - 20)  SpO2: 95% (17 Jan 2019 08:11) (95% - 100%)  I&O's Summary    GENERAL: NAD, well-developed  HEAD:  Atraumatic, Normocephalic  EYES: EOMI, PERRLA, conjunctiva and sclera clear  NECK: Supple, No JVD  CHEST/LUNG: Clear to auscultation bilaterally; No wheeze  HEART: Regular rate and rhythm; No murmurs, rubs, or gallops  ABDOMEN: Soft, Nontender, Nondistended; Bowel sounds present  EXTREMITIES:  2+ Peripheral Pulses, No clubbing, cyanosis, or edema  PSYCH: AAOx3  NEUROLOGY: non-focal  SKIN: No rashes or lesions    LABS:  CAPILLARY BLOOD GLUCOSE      POCT Blood Glucose.: 173 mg/dL (17 Jan 2019 12:41)  POCT Blood Glucose.: 130 mg/dL (17 Jan 2019 09:17)  POCT Blood Glucose.: 106 mg/dL (17 Jan 2019 06:17)                          13.4   6.14  )-----------( 172      ( 17 Jan 2019 07:00 )             40.9     01-17    143  |  108  |  20  ----------------------------<  124<H>  3.7   |  25  |  1.01    Ca    9.0      17 Jan 2019 05:31    TPro  5.8<L>  /  Alb  3.8  /  TBili  0.7  /  DBili  x   /  AST  22  /  ALT  36  /  AlkPhos  64  01-17    PT/INR - ( 16 Jan 2019 22:20 )   PT: 11.5 sec;   INR: 1.00 ratio         PTT - ( 16 Jan 2019 22:20 )  PTT:27.4 sec          RADIOLOGY & ADDITIONAL TESTS:    Imaging Personally Reviewed:    Consultant(s) Notes Reviewed:      Care Discussed with Consultants/Other Providers:
24H hour events: s/p DANNY/DCCV yesterday. On tele he remain sinus alia rate 50-70's and decreased to 40's during sleep    MEDICATIONS:  amLODIPine   Tablet 10 milliGRAM(s) Oral daily  apixaban 5 milliGRAM(s) Oral every 12 hours  lisinopril 40 milliGRAM(s) Oral daily  metoprolol tartrate 25 milliGRAM(s) Oral two times a day    acetaminophen   Tablet .. 650 milliGRAM(s) Oral every 6 hours PRN  celecoxib 100 milliGRAM(s) Oral two times a day PRN  LORazepam     Tablet 1 milliGRAM(s) Oral at bedtime      dextrose 40% Gel 15 Gram(s) Oral once PRN  dextrose 50% Injectable 25 Gram(s) IV Push once  glucagon  Injectable 1 milliGRAM(s) IntraMuscular once PRN  insulin lispro (HumaLOG) corrective regimen sliding scale   SubCutaneous three times a day before meals  insulin lispro (HumaLOG) corrective regimen sliding scale   SubCutaneous at bedtime  simvastatin 10 milliGRAM(s) Oral at bedtime    dextrose 5%. 1000 milliLiter(s) IV Continuous <Continuous>  influenza   Vaccine 0.5 milliLiter(s) IntraMuscular once      REVIEW OF SYSTEMS:  Complete 10point ROS negative.    PHYSICAL EXAM:  T(C): 36.6 (01-18-19 @ 04:00), Max: 36.7 (01-17-19 @ 19:07)  HR: 65 (01-18-19 @ 04:00) (65 - 98)  BP: 167/70 (01-18-19 @ 04:00) (149/69 - 184/86)  RR: 18 (01-18-19 @ 04:00) (18 - 18)  SpO2: 98% (01-18-19 @ 04:00) (96% - 98%)  I&O's Summary      Appearance: NAD  HEENT:   Normal oral mucosa, PERRL, EOMI	  Cardiovascular: Normal S1 S2, No JVD, No murmurs, No edema  Respiratory: Lungs clear to auscultation	  Psychiatry: A & O x 3, Mood & affect appropriate  Gastrointestinal:  Soft, Non-tender, + BS	  Skin: (+) ecchymoses on right elbows and b/l knees	  Neurologic: Non-focal  Extremities: No clubbing, cyanosis or edema. Limited motion on left shoulder due to dislocation from the fall  Vascular: Peripheral pulses palpable 2+ bilaterally      LABS:	 	    CBC Full  -  ( 18 Jan 2019 07:14 )  WBC Count : 5.52 K/uL  Hemoglobin : 13.7 g/dL  Hematocrit : 42.5 %  Platelet Count - Automated : 175 K/uL  Mean Cell Volume : 95.5 fl  Mean Cell Hemoglobin : 30.8 pg  Mean Cell Hemoglobin Concentration : 32.2 gm/dL  Auto Neutrophil # : 3.98 K/uL  Auto Lymphocyte # : 0.77 K/uL  Auto Monocyte # : 0.62 K/uL  Auto Eosinophil # : 0.12 K/uL  Auto Basophil # : 0.01 K/uL  Auto Neutrophil % : 72.1 %  Auto Lymphocyte % : 13.9 %  Auto Monocyte % : 11.2 %  Auto Eosinophil % : 2.2 %  Auto Basophil % : 0.2 %    01-18    140  |  105  |  23  ----------------------------<  103<H>  3.7   |  22  |  1.18  01-17    143  |  108  |  20  ----------------------------<  124<H>  3.7   |  25  |  1.01    Ca    9.0      18 Jan 2019 05:55  Ca    9.0      17 Jan 2019 05:31  Phos  3.1     01-18  Mg     2.0     01-18    TPro  5.8<L>  /  Alb  3.8  /  TBili  0.7  /  DBili  x   /  AST  22  /  ALT  36  /  AlkPhos  64  01-17  TPro  6.7  /  Alb  4.3  /  TBili  0.7  /  DBili  x   /  AST  26  /  ALT  45  /  AlkPhos  73  01-16      TELEMETRY: Sinus alia 50-70's and briefly to 43 during sleep hour  	    < from: Transthoracic Echocardiogram (01.17.19 @ 09:27) >  PROCEDURE: Transthoracic echocardiogram with 2-D, M-Mode  and complete spectral and color flow Doppler.  INDICATION: Unspecified atrial fibrillation (I48.91)  ------------------------------------------------------------------------  Dimensions:    Normal Values:  LA:     4.6    2.0 - 4.0 cm  Ao:     3.7    2.0 - 3.8 cm  SEPTUM: 1.0    0.6 - 1.2 cm  PWT:    1.0    0.6 - 1.1 cm  LVIDd:  5.4    3.0 - 5.6 cm  LVIDs:  4.0    1.8 - 4.0 cm  Derived variables:  LVMI: 104 g/m2  RWT: 0.37  Fractional short: 26 %  EF (Visual Estimate): 60 %  ------------------------------------------------------------------------  Observations:  Mitral Valve: Tethered mitral valve leaflets. Minimal  mitral regurgitation.  Aortic Valve/Aorta: Calcified trileaflet aortic valve with  normal opening. No aortic valve regurgitation seen.  Normal aortic root (Ao: 3.7 cm at the sinuses of Valsalva).  Left Atrium: Normal left atrium.  LA volume index = 32  cc/m2.  Left Ventricle: Underlying atrial fibrillation and poor  endocardial definition precludes accurate assessment of  LVEF. Grossly normal left ventricular ejection fraction. In  limited views, the left ventricular apex appears  hypokinetic. Normal left ventricular internal dimensions  and wall thickness. Mild diastolic dysfunction (Stage I).  Right Heart: Normal right atrium. Normal right ventricular  size and function. Normal tricuspid valve. Mild tricuspid  regurgitation. Pulmonic valve not well visualized.  Pericardium/Pleura: Normal pericardium with no pericardial  effusion.  Hemodynamic: Estimated right atrial pressure is 8 mm Hg.  Estimated right ventricular systolic pressure equals 33 mm  Hg, assuming right atrial pressure equals 8 mm Hg,  consistent with normal pulmonary pressures.  ------------------------------------------------------------------------  Conclusions:  1. Normal left ventricular internal dimensions and wall  thickness.  2. Underlying atrial fibrillation and poor endocardial  definition precludes accurate assessment of LVEF. Grossly  normal left ventricular ejection fraction. In limited  views, the left ventricular apex appears hypokinetic.  3. Normal right ventricular size and function.  4. Estimated pulmonary artery systolic pressure equals 33  mm Hg, assuming right atrial pressure equals 8  mm Hg,  consistent with normal pulmonary pressures.    < from: Transesophageal Echocardiogram w/o TTE (01.17.19 @ 11:37) >  Conclusions:  1. No left atrial or left atrialappendage thrombus.  Decreased left atrial appendage velocities noted.  2. Apical variant hypertrophy with chamber olbliteration at  the mid and distal ventricle.  3. Normal left ventricular systolic function.  4. Right atrial enlargement.  5. Normalright ventricular size and function.  6. Estimated pulmonary artery systolic pressure equals 27  mm Hg, assuming right atrial pressure equals 8  mm Hg,  consistent with normal pulmonary pressures.  7. Agitated saline injection and color flow Doppler  demonstrates no evidence of a patent foramen ovale.
24H hour events: stable post ICD/CV on sotolol    MEDICATIONS:  amLODIPine   Tablet 10 milliGRAM(s) Oral daily  lisinopril 40 milliGRAM(s) Oral daily  sotalol 120 milliGRAM(s) Oral two times a day  acetaminophen   Tablet .. 650 milliGRAM(s) Oral every 6 hours PRN  celecoxib 100 milliGRAM(s) Oral two times a day PRN  LORazepam     Tablet 1 milliGRAM(s) Oral at bedtime  glucagon  Injectable 1 milliGRAM(s) IntraMuscular once PRN  insulin lispro (HumaLOG) corrective regimen sliding scale   SubCutaneous three times a day before meals  insulin lispro (HumaLOG) corrective regimen sliding scale   SubCutaneous at bedtime  simvastatin 10 milliGRAM(s) Oral at bedtime    PHYSICAL EXAM:  T(C): 36.6 (01-21-19 @ 11:54), Max: 36.8 (01-20-19 @ 12:43)  HR: 61 (01-21-19 @ 11:54) (60 - 72)  BP: 151/83 (01-21-19 @ 11:54) (138/82 - 159/86)  RR: 18 (01-21-19 @ 11:54) (18 - 18)  SpO2: 97% (01-21-19 @ 11:54) (97% - 100%)  Wt(kg): --  I&O's Summary    20 Jan 2019 07:01  -  21 Jan 2019 07:00  --------------------------------------------------------  IN: 710 mL / OUT: 0 mL / NET: 710 mL    LABS:	 	    CBC Full  -  ( 21 Jan 2019 07:43 )  WBC Count : 4.92 K/uL  Hemoglobin : 13.3 g/dL  Hematocrit : 39.9 %  Platelet Count - Automated : 147 K/uL  Mean Cell Volume : 92.4 fl  Mean Cell Hemoglobin : 30.8 pg      01-21    138  |  103  |  29<H>  ----------------------------<  165<H>  3.8   |  23  |  1.03  01-20    142  |  108  |  30<H>  ----------------------------<  157<H>  3.8   |  22  |  1.06    Ca    9.0      21 Jan 2019 06:50  Ca    9.1      20 Jan 2019 05:12  Phos  3.4     01-20  Mg     2.1     01-20    TELEMETRY:  A pace/V sensed rhtyhm    ECG: A pace/V sense rhythm 	  RADIOLOGY: nl ICD placement    ASSESSMENT/PLAN: 	  stable post ICD implant/CV  continue sotolol 120BID  start aliquis 5mg BID tomorrow AM  F/U device clinic 10-24 days
INTERVAL HPI/OVERNIGHT EVENTS: Seen in bed resting comfortably    MEDICATIONS  (STANDING):  amLODIPine   Tablet 10 milliGRAM(s) Oral daily  apixaban 5 milliGRAM(s) Oral every 12 hours  dextrose 5%. 1000 milliLiter(s) (50 mL/Hr) IV Continuous <Continuous>  dextrose 50% Injectable 25 Gram(s) IV Push once  influenza   Vaccine 0.5 milliLiter(s) IntraMuscular once  insulin lispro (HumaLOG) corrective regimen sliding scale   SubCutaneous three times a day before meals  insulin lispro (HumaLOG) corrective regimen sliding scale   SubCutaneous at bedtime  lisinopril 40 milliGRAM(s) Oral daily  LORazepam     Tablet 1 milliGRAM(s) Oral at bedtime  simvastatin 10 milliGRAM(s) Oral at bedtime  sotalol 80 milliGRAM(s) Oral two times a day    MEDICATIONS  (PRN):  acetaminophen   Tablet .. 650 milliGRAM(s) Oral every 6 hours PRN Mild Pain (1 - 3)  celecoxib 100 milliGRAM(s) Oral two times a day PRN Mild Pain (1 - 3)  dextrose 40% Gel 15 Gram(s) Oral once PRN Blood Glucose LESS THAN 70 milliGRAM(s)/deciliter  glucagon  Injectable 1 milliGRAM(s) IntraMuscular once PRN Glucose LESS THAN 70 milligrams/deciliter      Allergies    No Known Allergies    Intolerances      ROS:  General: Pt denies fever/chills  Cardiovascular: denies chest pain/palpitations/leg edema  Respiratory and Thorax: denies SOB/cough/wheezing  Gastrointestinal: denies abdominal pain/diarrhea/constipation/bloody stool  Skin: denies rashes/sores        Vital Signs Last 24 Hrs  T(C): 36.7 (19 Jan 2019 12:03), Max: 36.9 (19 Jan 2019 01:56)  T(F): 98.1 (19 Jan 2019 12:03), Max: 98.5 (19 Jan 2019 01:56)  HR: 75 (19 Jan 2019 12:03) (75 - 135)  BP: 129/85 (19 Jan 2019 12:03) (127/67 - 169/91)  BP(mean): --  RR: 18 (19 Jan 2019 12:03) (18 - 18)  SpO2: 96% (19 Jan 2019 12:03) (96% - 98%)    Physical Exam:  Neurological: Alert & Oriented x 3  Respiratory: CTA B/L, No wheezing/crackles/rhonchi  Cardiovascular: (+) S1 & S2, RRR  Gastrointestinal: soft, NT, nondistended, (+) BS  Extremities: No pedal edema, No clubbing, No cyanosis  Skin:  normal skin color and pigmentation, no skin lesions          LABS:                        14.3   4.49  )-----------( 163      ( 19 Jan 2019 06:47 )             43.2     01-19    144  |  108  |  30<H>  ----------------------------<  143<H>  4.0   |  22  |  1.09    Ca    9.1      19 Jan 2019 06:26  Phos  3.1     01-18  Mg     2.0     01-18
Patient is a 78y old  Male who presents with a chief complaint of fall on day of admission (18 Jan 2019 18:23)      SUBJECTIVE / OVERNIGHT EVENTS:  no new symptoms. s/p cardiac MRI  Review of Systems:   CONSTITUTIONAL: No fever, weight loss, or fatigue  EYES: No eye pain, visual disturbances, or discharge  ENMT:  No difficulty hearing, tinnitus, vertigo; No sinus or throat pain  NECK: No pain or stiffness  BREASTS: No pain, masses, or nipple discharge  RESPIRATORY: No cough, wheezing, chills or hemoptysis; No shortness of breath  CARDIOVASCULAR: No chest pain, palpitations, dizziness, or leg swelling  GASTROINTESTINAL: No abdominal or epigastric pain. No nausea, vomiting, or hematemesis; No diarrhea or constipation. No melena or hematochezia.  GENITOURINARY: No dysuria, frequency, hematuria, or incontinence  NEUROLOGICAL: No headaches, memory loss, loss of strength, numbness, or tremors  SKIN: No itching, burning, rashes, or lesions   LYMPH NODES: No enlarged glands  ENDOCRINE: No heat or cold intolerance; No hair loss  MUSCULOSKELETAL: No joint pain or swelling; No muscle, back, or extremity pain  PSYCHIATRIC: No depression, anxiety, mood swings, or difficulty sleeping  HEME/LYMPH: No easy bruising, or bleeding gums  ALLERY AND IMMUNOLOGIC: No hives or eczema    MEDICATIONS  (STANDING):  amLODIPine   Tablet 10 milliGRAM(s) Oral daily  apixaban 5 milliGRAM(s) Oral every 12 hours  dextrose 5%. 1000 milliLiter(s) (50 mL/Hr) IV Continuous <Continuous>  dextrose 50% Injectable 25 Gram(s) IV Push once  influenza   Vaccine 0.5 milliLiter(s) IntraMuscular once  insulin lispro (HumaLOG) corrective regimen sliding scale   SubCutaneous three times a day before meals  insulin lispro (HumaLOG) corrective regimen sliding scale   SubCutaneous at bedtime  lisinopril 40 milliGRAM(s) Oral daily  LORazepam     Tablet 1 milliGRAM(s) Oral at bedtime  simvastatin 10 milliGRAM(s) Oral at bedtime  sotalol 80 milliGRAM(s) Oral two times a day    MEDICATIONS  (PRN):  acetaminophen   Tablet .. 650 milliGRAM(s) Oral every 6 hours PRN Mild Pain (1 - 3)  celecoxib 100 milliGRAM(s) Oral two times a day PRN Mild Pain (1 - 3)  dextrose 40% Gel 15 Gram(s) Oral once PRN Blood Glucose LESS THAN 70 milliGRAM(s)/deciliter  glucagon  Injectable 1 milliGRAM(s) IntraMuscular once PRN Glucose LESS THAN 70 milligrams/deciliter      PHYSICAL EXAM:  Vital Signs Last 24 Hrs  T(C): 36.6 (18 Jan 2019 21:07), Max: 36.8 (18 Jan 2019 12:09)  T(F): 97.9 (18 Jan 2019 21:07), Max: 98.2 (18 Jan 2019 12:09)  HR: 91 (18 Jan 2019 21:07) (65 - 135)  BP: 133/85 (18 Jan 2019 21:07) (133/85 - 169/91)  BP(mean): --  RR: 18 (18 Jan 2019 21:07) (18 - 18)  SpO2: 97% (18 Jan 2019 21:07) (97% - 99%)  I&O's Summary    18 Jan 2019 07:01  -  18 Jan 2019 22:26  --------------------------------------------------------  IN: 100 mL / OUT: 0 mL / NET: 100 mL      GENERAL: NAD, well-developed  HEAD:  Atraumatic, Normocephalic  EYES: EOMI, PERRLA, conjunctiva and sclera clear  NECK: Supple, No JVD  CHEST/LUNG: Clear to auscultation bilaterally; No wheeze  HEART: Regular rate and rhythm; No murmurs, rubs, or gallops  ABDOMEN: Soft, Nontender, Nondistended; Bowel sounds present  EXTREMITIES:  2+ Peripheral Pulses, No clubbing, cyanosis, or edema  PSYCH: AAOx3  NEUROLOGY: non-focal  SKIN: No rashes or lesions    LABS:  CAPILLARY BLOOD GLUCOSE      POCT Blood Glucose.: 262 mg/dL (18 Jan 2019 21:09)  POCT Blood Glucose.: 121 mg/dL (18 Jan 2019 16:54)  POCT Blood Glucose.: 167 mg/dL (18 Jan 2019 12:29)  POCT Blood Glucose.: 131 mg/dL (18 Jan 2019 08:50)  POCT Blood Glucose.: 242 mg/dL (17 Jan 2019 22:40)                          13.7   5.52  )-----------( 175      ( 18 Jan 2019 07:14 )             42.5     01-18    140  |  105  |  23  ----------------------------<  103<H>  3.7   |  22  |  1.18    Ca    9.0      18 Jan 2019 05:55  Phos  3.1     01-18  Mg     2.0     01-18    TPro  5.8<L>  /  Alb  3.8  /  TBili  0.7  /  DBili  x   /  AST  22  /  ALT  36  /  AlkPhos  64  01-17              RADIOLOGY & ADDITIONAL TESTS:    Imaging Personally Reviewed:    Consultant(s) Notes Reviewed:      Care Discussed with Consultants/Other Providers:
Patient is a 78y old  Male who presents with a chief complaint of fall on day of admission (19 Jan 2019 12:17)      SUBJECTIVE / OVERNIGHT EVENTS:  HR stable.  S/p DCCV yesterday  Review of Systems:   CONSTITUTIONAL: No fever, weight loss, or fatigue  EYES: No eye pain, visual disturbances, or discharge  ENMT:  No difficulty hearing, tinnitus, vertigo; No sinus or throat pain  NECK: No pain or stiffness  BREASTS: No pain, masses, or nipple discharge  RESPIRATORY: No cough, wheezing, chills or hemoptysis; No shortness of breath  CARDIOVASCULAR: No chest pain, palpitations, dizziness, or leg swelling  GASTROINTESTINAL: No abdominal or epigastric pain. No nausea, vomiting, or hematemesis; No diarrhea or constipation. No melena or hematochezia.  GENITOURINARY: No dysuria, frequency, hematuria, or incontinence  NEUROLOGICAL: No headaches, memory loss, loss of strength, numbness, or tremors  SKIN: No itching, burning, rashes, or lesions   LYMPH NODES: No enlarged glands  ENDOCRINE: No heat or cold intolerance; No hair loss  MUSCULOSKELETAL: No joint pain or swelling; No muscle, back, or extremity pain  PSYCHIATRIC: No depression, anxiety, mood swings, or difficulty sleeping  HEME/LYMPH: No easy bruising, or bleeding gums  ALLERY AND IMMUNOLOGIC: No hives or eczema    MEDICATIONS  (STANDING):  amLODIPine   Tablet 10 milliGRAM(s) Oral daily  dextrose 5%. 1000 milliLiter(s) (50 mL/Hr) IV Continuous <Continuous>  dextrose 50% Injectable 25 Gram(s) IV Push once  influenza   Vaccine 0.5 milliLiter(s) IntraMuscular once  insulin lispro (HumaLOG) corrective regimen sliding scale   SubCutaneous three times a day before meals  insulin lispro (HumaLOG) corrective regimen sliding scale   SubCutaneous at bedtime  lisinopril 40 milliGRAM(s) Oral daily  LORazepam     Tablet 1 milliGRAM(s) Oral at bedtime  simvastatin 10 milliGRAM(s) Oral at bedtime  sotalol 80 milliGRAM(s) Oral two times a day    MEDICATIONS  (PRN):  acetaminophen   Tablet .. 650 milliGRAM(s) Oral every 6 hours PRN Mild Pain (1 - 3)  celecoxib 100 milliGRAM(s) Oral two times a day PRN Mild Pain (1 - 3)  dextrose 40% Gel 15 Gram(s) Oral once PRN Blood Glucose LESS THAN 70 milliGRAM(s)/deciliter  glucagon  Injectable 1 milliGRAM(s) IntraMuscular once PRN Glucose LESS THAN 70 milligrams/deciliter      PHYSICAL EXAM:  Vital Signs Last 24 Hrs  T(C): 36.7 (19 Jan 2019 12:03), Max: 36.9 (19 Jan 2019 01:56)  T(F): 98.1 (19 Jan 2019 12:03), Max: 98.5 (19 Jan 2019 01:56)  HR: 75 (19 Jan 2019 12:03) (75 - 94)  BP: 129/85 (19 Jan 2019 12:03) (127/67 - 147/94)  BP(mean): --  RR: 18 (19 Jan 2019 12:03) (18 - 18)  SpO2: 96% (19 Jan 2019 12:03) (96% - 98%)  I&O's Summary    18 Jan 2019 07:01  -  19 Jan 2019 07:00  --------------------------------------------------------  IN: 100 mL / OUT: 0 mL / NET: 100 mL    19 Jan 2019 07:01  -  19 Jan 2019 20:18  --------------------------------------------------------  IN: 840 mL / OUT: 0 mL / NET: 840 mL      GENERAL: NAD, well-developed  HEAD:  Atraumatic, Normocephalic  EYES: EOMI, PERRLA, conjunctiva and sclera clear  NECK: Supple, No JVD  CHEST/LUNG: Clear to auscultation bilaterally; No wheeze  HEART: Regular rate and rhythm; No murmurs, rubs, or gallops  ABDOMEN: Soft, Nontender, Nondistended; Bowel sounds present  EXTREMITIES:  2+ Peripheral Pulses, No clubbing, cyanosis, or edema  PSYCH: AAOx3  NEUROLOGY: non-focal  SKIN: No rashes or lesions    LABS:  CAPILLARY BLOOD GLUCOSE      POCT Blood Glucose.: 119 mg/dL (19 Jan 2019 16:35)  POCT Blood Glucose.: 189 mg/dL (19 Jan 2019 11:56)  POCT Blood Glucose.: 135 mg/dL (19 Jan 2019 08:07)  POCT Blood Glucose.: 262 mg/dL (18 Jan 2019 21:09)                          14.3   4.49  )-----------( 163      ( 19 Jan 2019 06:47 )             43.2     01-19    144  |  108  |  30<H>  ----------------------------<  143<H>  4.0   |  22  |  1.09    Ca    9.1      19 Jan 2019 06:26  Phos  3.1     01-18  Mg     2.0     01-18                RADIOLOGY & ADDITIONAL TESTS:    Imaging Personally Reviewed:    Consultant(s) Notes Reviewed:      Care Discussed with Consultants/Other Providers:
SUBJ:  Feeling well. cMRI confirms apical variant hypertrophic cardiomyopathy. It remains somewhat unclear if he had a syncopal event versus a mechanical fall but states that he did not loose  consciousness. EP to determine further management with regards to ILR versus ICD. AF s/p DANNY/DCCV in NSR.     MEDICATIONS  (STANDING):   amLODIPine   Tablet 10 milliGRAM(s) Oral daily   apixaban 5 milliGRAM(s) Oral every 12 hours  dextrose 5%. 1000 milliLiter(s) (50 mL/Hr) IV Continuous <Continuous>  dextrose 50% Injectable 25 Gram(s) IV Push once  influenza   Vaccine 0.5 milliLiter(s) IntraMuscular once  insulin lispro (HumaLOG) corrective regimen sliding scale   SubCutaneous three times a day before meals  insulin lispro (HumaLOG) corrective regimen sliding scale   SubCutaneous at bedtime  lisinopril 40 milliGRAM(s) Oral daily  LORazepam     Tablet 1 milliGRAM(s) Oral at bedtime  metoprolol tartrate 25 milliGRAM(s) Oral two times a day  simvastatin 10 milliGRAM(s) Oral at bedtime    MEDICATIONS  (PRN):  acetaminophen   Tablet .. 650 milliGRAM(s) Oral every 6 hours PRN Mild Pain (1 - 3)  celecoxib 100 milliGRAM(s) Oral two times a day PRN Mild Pain (1 - 3)  dextrose 40% Gel 15 Gram(s) Oral once PRN Blood Glucose LESS THAN 70 milliGRAM(s)/deciliter  glucagon  Injectable 1 milliGRAM(s) IntraMuscular once PRN Glucose LESS THAN 70 milligrams/deciliter    Vital Signs Last 24 Hrs  T(C): 36.8 (18 Jan 2019 12:09), Max: 36.8 (18 Jan 2019 12:09)  T(F): 98.2 (18 Jan 2019 12:09), Max: 98.2 (18 Jan 2019 12:09)  HR: 75 (18 Jan 2019 12:09) (65 - 98)  BP: 151/83 (18 Jan 2019 12:09) (149/69 - 184/86)  RR: 18 (18 Jan 2019 12:09) (18 - 18)  SpO2: 99% (18 Jan 2019 12:09) (96% - 99%)    REVIEW OF SYSTEMS:  As per HPI, otherwise unremarkable.     PHYSICAL EXAM:  · CONSTITUTIONAL: Well-developed, well nourished      · EYES: no drainage or redness  · NECK: Supple  ·RESPIRATORY:   airway patent; breath sounds equal; good air movement; respirations non-labored; clear to auscultation bilaterally  · CARDIOVASCULAR: regular rate and rhythm   . GASTROINTESTINAL:  no distention  · EXTREMITIES: No cyanosis, clubbing  · VASCULAR:  Equal and normal pulses (radial, femoral)  ·NEUROLOGICAL:  Alert and oriented x 3; sensation intact; deep reflexes intact; cranial nerves intact; no spontaneous movement; superficial reflexes intact; normal strength  · SKIN: No lesions; no rash  . LYMPH NODES: No lymphadedenopathy  · MUSCULOSKELETAL:  No calf tenderness  no joint swelling    TELEMETRY: NSR    TTE: 1/17/2019  Conclusions:  1. Normal left ventricular internal dimensions and wall  thickness.  2. Underlying atrial fibrillation and poor endocardial  definition precludes accurate assessment of LVEF. Grossly  normal left ventricular ejection fraction. In limited  views, the left ventricular apex appears hypokinetic.  3. Normal right ventricular size and function.  4. Estimated pulmonary artery systolic pressure equals 33  mm Hg, assuming right atrial pressure equals 8  mm Hg,  consistent with normal pulmonary pressures.  *** No previous Echo exam.    MRI: 1/19/2019  IMPRESSION:  Diffuse thickening of the left ventricle, most predominant in the apical   segments consistent with apical variant hypertrophic cardiomyopathy. No   evidence of outflow tract obstruction. No regional wall motion   abnormality is visualized. On delayed enhancement sequence, there is   enhancement along the apical anterior and apical lateral segments   consistent with scarring.     LABS:   CBC Full  -  ( 18 Jan 2019 07:14 )  WBC Count : 5.52 K/uL  Hemoglobin : 13.7 g/dL  Hematocrit : 42.5 %  Platelet Count - Automated : 175 K/uL  Mean Cell Volume : 95.5 fl  Mean Cell Hemoglobin : 30.8 pg  Mean Cell Hemoglobin Concentration : 32.2 gm/dL  Auto Neutrophil # : 3.98 K/uL  Auto Lymphocyte # : 0.77 K/uL  Auto Monocyte # : 0.62 K/uL  Auto Eosinophil # : 0.12 K/uL  Auto Basophil # : 0.01 K/uL  Auto Neutrophil % : 72.1 %  Auto Lymphocyte % : 13.9 %  Auto Monocyte % : 11.2 %  Auto Eosinophil % : 2.2 %  Auto Basophil % : 0.2 %    01-18    140  |  105  |  23  ----------------------------<  103<H>  3.7   |  22  |  1.18    Ca    9.0      18 Jan 2019 05:55  Phos  3.1     01-18  Mg     2.0     01-18    TPro  5.8<L>  /  Alb  3.8  /  TBili  0.7  /  DBili  x   /  AST  22  /  ALT  36  /  AlkPhos  64  01-17    IMPRESSION AND PLAN:  78y Male patient PMH HTN, DM , HLD, BPH presenting with a mechanical fall causing shoulder dislocation incidentally found to be in atrial fibrillation s/p DANNY/DCCV as well as apical hypertrophic cardiomyopathy with scarring.     1) AF/RVR   s/p DANNY/DCCV  CHADS2-Vasc: 4     ·	Continue medical management with metoprolol tartrate 25 mg BID and apixaban 5 mg BID.   ·	Appreciate EP recommendations.     2) Apical variant Hypertrophic Cardiomyopathy with scarring  Confirmed by cMRI   Unclear if he had a true syncopal event versus mechanical fall.     ·	Defer to EP regarding placement if ILR versus ICD. Once this has been discussed and implanted, discharge disposition planning should be done.     Josefina Rocha MD, MPH, KARMA  Cardiovascular Specialist Attending  [facility]  C: [phone]  E: [email or web address]  (Cardiology nocturnist available every night 7 pm - 7 am; available week days for follow-up; general cardiology consult coverage weekend days)
Patient is a 78y old  Male who presents with a chief complaint of fall on day of admission, new onset atrial fibrillation (20 Jan 2019 18:04)      SUBJECTIVE / OVERNIGHT EVENTS:  s/p ICD placement No new symptoms. Appears and feels comfortable  Review of Systems:   CONSTITUTIONAL: No fever, weight loss, or fatigue  EYES: No eye pain, visual disturbances, or discharge  ENMT:  No difficulty hearing, tinnitus, vertigo; No sinus or throat pain  NECK: No pain or stiffness  BREASTS: No pain, masses, or nipple discharge  RESPIRATORY: No cough, wheezing, chills or hemoptysis; No shortness of breath  CARDIOVASCULAR: No chest pain, palpitations, dizziness, or leg swelling  GASTROINTESTINAL: No abdominal or epigastric pain. No nausea, vomiting, or hematemesis; No diarrhea or constipation. No melena or hematochezia.  GENITOURINARY: No dysuria, frequency, hematuria, or incontinence  NEUROLOGICAL: No headaches, memory loss, loss of strength, numbness, or tremors  SKIN: No itching, burning, rashes, or lesions   LYMPH NODES: No enlarged glands  ENDOCRINE: No heat or cold intolerance; No hair loss  MUSCULOSKELETAL: No joint pain or swelling; No muscle, back, or extremity pain  PSYCHIATRIC: No depression, anxiety, mood swings, or difficulty sleeping  HEME/LYMPH: No easy bruising, or bleeding gums  ALLERY AND IMMUNOLOGIC: No hives or eczema    MEDICATIONS  (STANDING):  amLODIPine   Tablet 10 milliGRAM(s) Oral daily  ceFAZolin   IVPB 1000 milliGRAM(s) IV Intermittent every 8 hours  dextrose 5%. 1000 milliLiter(s) (50 mL/Hr) IV Continuous <Continuous>  dextrose 50% Injectable 25 Gram(s) IV Push once  influenza   Vaccine 0.5 milliLiter(s) IntraMuscular once  insulin lispro (HumaLOG) corrective regimen sliding scale   SubCutaneous three times a day before meals  insulin lispro (HumaLOG) corrective regimen sliding scale   SubCutaneous at bedtime  lisinopril 40 milliGRAM(s) Oral daily  LORazepam     Tablet 1 milliGRAM(s) Oral at bedtime  simvastatin 10 milliGRAM(s) Oral at bedtime  sotalol 120 milliGRAM(s) Oral two times a day    MEDICATIONS  (PRN):  acetaminophen   Tablet .. 650 milliGRAM(s) Oral every 6 hours PRN Mild Pain (1 - 3)  celecoxib 100 milliGRAM(s) Oral two times a day PRN Mild Pain (1 - 3)  dextrose 40% Gel 15 Gram(s) Oral once PRN Blood Glucose LESS THAN 70 milliGRAM(s)/deciliter  glucagon  Injectable 1 milliGRAM(s) IntraMuscular once PRN Glucose LESS THAN 70 milligrams/deciliter      PHYSICAL EXAM:  Vital Signs Last 24 Hrs  T(C): 36.5 (20 Jan 2019 18:30), Max: 36.9 (19 Jan 2019 20:49)  T(F): 97.7 (20 Jan 2019 18:30), Max: 98.5 (19 Jan 2019 20:49)  HR: 72 (20 Jan 2019 18:30) (62 - 87)  BP: 148/83 (20 Jan 2019 18:30) (135/83 - 150/96)  BP(mean): --  RR: 18 (20 Jan 2019 18:30) (18 - 18)  SpO2: 99% (20 Jan 2019 18:30) (96% - 100%)  I&O's Summary    19 Jan 2019 07:01  -  20 Jan 2019 07:00  --------------------------------------------------------  IN: 1080 mL / OUT: 0 mL / NET: 1080 mL    20 Jan 2019 07:01  -  20 Jan 2019 18:44  --------------------------------------------------------  IN: 600 mL / OUT: 0 mL / NET: 600 mL      GENERAL: NAD, well-developed  HEAD:  Atraumatic, Normocephalic  EYES: EOMI, PERRLA, conjunctiva and sclera clear  NECK: Supple, No JVD  CHEST/LUNG: Clear to auscultation bilaterally; No wheeze  HEART: Regular rate and rhythm; No murmurs, rubs, or gallops  ABDOMEN: Soft, Nontender, Nondistended; Bowel sounds present  EXTREMITIES:  2+ Peripheral Pulses, No clubbing, cyanosis, or edema  PSYCH: AAOx3  NEUROLOGY: non-focal  SKIN: No rashes or lesions    LABS:  CAPILLARY BLOOD GLUCOSE      POCT Blood Glucose.: 226 mg/dL (20 Jan 2019 16:36)  POCT Blood Glucose.: 173 mg/dL (20 Jan 2019 12:39)  POCT Blood Glucose.: 165 mg/dL (20 Jan 2019 07:59)  POCT Blood Glucose.: 223 mg/dL (19 Jan 2019 21:42)                          14.2   4.11  )-----------( 163      ( 20 Jan 2019 08:19 )             43.9     01-20    142  |  108  |  30<H>  ----------------------------<  157<H>  3.8   |  22  |  1.06    Ca    9.1      20 Jan 2019 05:12  Phos  3.4     01-20  Mg     2.1     01-20      PT/INR - ( 20 Jan 2019 08:15 )   PT: 11.8 sec;   INR: 1.05 ratio                   RADIOLOGY & ADDITIONAL TESTS:    Imaging Personally Reviewed:    Consultant(s) Notes Reviewed:      Care Discussed with Consultants/Other Providers:

## 2019-01-21 NOTE — PROGRESS NOTE ADULT - ASSESSMENT
stable post ICD implant/CV  continue sotolol 120BID  start aliquis 5mg BID tomorrow AM  F/U device clinic 10-24 days

## 2019-02-08 ENCOUNTER — NON-APPOINTMENT (OUTPATIENT)
Age: 79
End: 2019-02-08

## 2019-02-08 ENCOUNTER — APPOINTMENT (OUTPATIENT)
Dept: ELECTROPHYSIOLOGY | Facility: CLINIC | Age: 79
End: 2019-02-08
Payer: MEDICARE

## 2019-02-08 VITALS
HEIGHT: 67 IN | SYSTOLIC BLOOD PRESSURE: 132 MMHG | HEART RATE: 62 BPM | DIASTOLIC BLOOD PRESSURE: 79 MMHG | OXYGEN SATURATION: 98 %

## 2019-02-08 PROCEDURE — 99024 POSTOP FOLLOW-UP VISIT: CPT

## 2019-02-08 PROCEDURE — 93283 PRGRMG EVAL IMPLANTABLE DFB: CPT

## 2019-02-19 ENCOUNTER — APPOINTMENT (OUTPATIENT)
Dept: CARDIOLOGY | Facility: CLINIC | Age: 79
End: 2019-02-19
Payer: MEDICARE

## 2019-02-19 ENCOUNTER — NON-APPOINTMENT (OUTPATIENT)
Age: 79
End: 2019-02-19

## 2019-02-19 VITALS
DIASTOLIC BLOOD PRESSURE: 84 MMHG | HEART RATE: 59 BPM | OXYGEN SATURATION: 97 % | BODY MASS INDEX: 30.13 KG/M2 | WEIGHT: 192 LBS | HEIGHT: 67 IN | SYSTOLIC BLOOD PRESSURE: 165 MMHG

## 2019-02-19 PROCEDURE — 93000 ELECTROCARDIOGRAM COMPLETE: CPT

## 2019-02-19 PROCEDURE — 99205 OFFICE O/P NEW HI 60 MIN: CPT

## 2019-02-19 NOTE — HISTORY OF PRESENT ILLNESS
[FreeTextEntry1] : Jeanne is a 78-year-old gentleman DM, Htn, Hyperlipidemia, s/p hospitalization after syncope with dislocation shoulder found to be in rapid afib. DC cardioversion. ECHO apical HOCM confirmed by cardiac MRI. ICD implanted and d/c on sotalol. Interrogation last week. Now here to establish f/u care. He worries about battery life. HE takes alleve and celebrex. He is a retired physician.

## 2019-02-19 NOTE — PHYSICAL EXAM
[General Appearance - Well Developed] : well developed [Normal Appearance] : normal appearance [Well Groomed] : well groomed [General Appearance - Well Nourished] : well nourished [No Deformities] : no deformities [General Appearance - In No Acute Distress] : no acute distress [Normal Conjunctiva] : the conjunctiva exhibited no abnormalities [Eyelids - No Xanthelasma] : the eyelids demonstrated no xanthelasmas [Normal Oral Mucosa] : normal oral mucosa [No Oral Pallor] : no oral pallor [No Oral Cyanosis] : no oral cyanosis [Normal Jugular Venous A Waves Present] : normal jugular venous A waves present [Normal Jugular Venous V Waves Present] : normal jugular venous V waves present [No Jugular Venous Treadwell A Waves] : no jugular venous treadwell A waves [Heart Rate And Rhythm] : heart rate and rhythm were normal [Heart Sounds] : normal S1 and S2 [Murmurs] : no murmurs present [Respiration, Rhythm And Depth] : normal respiratory rhythm and effort [Exaggerated Use Of Accessory Muscles For Inspiration] : no accessory muscle use [Auscultation Breath Sounds / Voice Sounds] : lungs were clear to auscultation bilaterally [Abdomen Soft] : soft [Abdomen Tenderness] : non-tender [Abdomen Mass (___ Cm)] : no abdominal mass palpated [Abnormal Walk] : normal gait [Gait - Sufficient For Exercise Testing] : the gait was sufficient for exercise testing [Nail Clubbing] : no clubbing of the fingernails [Cyanosis, Localized] : no localized cyanosis [Petechial Hemorrhages (___cm)] : no petechial hemorrhages [Skin Color & Pigmentation] : normal skin color and pigmentation [] : no rash [No Venous Stasis] : no venous stasis [Skin Lesions] : no skin lesions [No Skin Ulcers] : no skin ulcer [No Xanthoma] : no  xanthoma was observed [Oriented To Time, Place, And Person] : oriented to person, place, and time [Affect] : the affect was normal [Mood] : the mood was normal [No Anxiety] : not feeling anxious

## 2019-02-19 NOTE — DISCUSSION/SUMMARY
[FreeTextEntry1] : The patient is a 78-year-old gentleman diabetes mellitus, hypertension, hyperlipidemia, s/p syncope with dislocation shoulder and new onset afib now with ICD for apical hypertrophic cardiomyopathy. \par #1 HOCM Apical- [facility] ICD, dizziness resolved\par #2 Afib- on sotalol, eliquis no bleeding\par #3 DM- HbA1c 7.4% \par #4 Htn- Accupril 40mg\par #5 Lipids- unclear if on statin\par #6 General- increase PT on shoulder, We discussed adherence to a low fat low cholesterol, ADA diet, weight loss and regular daily exercise.\par \par \par

## 2019-02-19 NOTE — REASON FOR VISIT
[Follow-Up - From Hospitalization] : follow-up of a recent hospitalization for [Atrial Fibrillation] : atrial fibrillation

## 2019-03-15 ENCOUNTER — EMERGENCY (EMERGENCY)
Facility: HOSPITAL | Age: 79
LOS: 1 days | Discharge: HOME | End: 2019-03-15
Attending: EMERGENCY MEDICINE
Payer: COMMERCIAL

## 2019-03-15 VITALS
DIASTOLIC BLOOD PRESSURE: 78 MMHG | HEART RATE: 60 BPM | OXYGEN SATURATION: 99 % | RESPIRATION RATE: 18 BRPM | SYSTOLIC BLOOD PRESSURE: 159 MMHG | TEMPERATURE: 98 F

## 2019-03-15 VITALS
HEART RATE: 60 BPM | RESPIRATION RATE: 17 BRPM | OXYGEN SATURATION: 100 % | WEIGHT: 195.11 LBS | TEMPERATURE: 99 F | HEIGHT: 67 IN | DIASTOLIC BLOOD PRESSURE: 77 MMHG | SYSTOLIC BLOOD PRESSURE: 145 MMHG

## 2019-03-15 DIAGNOSIS — Z98.890 OTHER SPECIFIED POSTPROCEDURAL STATES: Chronic | ICD-10-CM

## 2019-03-15 DIAGNOSIS — Z90.79 ACQUIRED ABSENCE OF OTHER GENITAL ORGAN(S): Chronic | ICD-10-CM

## 2019-03-15 LAB
ALBUMIN SERPL ELPH-MCNC: 4.3 G/DL — SIGNIFICANT CHANGE UP (ref 3.3–5)
ALP SERPL-CCNC: 81 U/L — SIGNIFICANT CHANGE UP (ref 40–120)
ALT FLD-CCNC: 9 U/L — LOW (ref 10–45)
ANION GAP SERPL CALC-SCNC: 13 MMOL/L — SIGNIFICANT CHANGE UP (ref 5–17)
APPEARANCE UR: CLEAR — SIGNIFICANT CHANGE UP
APTT BLD: 37.1 SEC — HIGH (ref 27.5–36.3)
AST SERPL-CCNC: 15 U/L — SIGNIFICANT CHANGE UP (ref 10–40)
BASOPHILS # BLD AUTO: 0 K/UL — SIGNIFICANT CHANGE UP (ref 0–0.2)
BASOPHILS NFR BLD AUTO: 0.4 % — SIGNIFICANT CHANGE UP (ref 0–2)
BILIRUB SERPL-MCNC: 0.7 MG/DL — SIGNIFICANT CHANGE UP (ref 0.2–1.2)
BILIRUB UR-MCNC: NEGATIVE — SIGNIFICANT CHANGE UP
BUN SERPL-MCNC: 26 MG/DL — HIGH (ref 7–23)
CALCIUM SERPL-MCNC: 10 MG/DL — SIGNIFICANT CHANGE UP (ref 8.4–10.5)
CHLORIDE SERPL-SCNC: 102 MMOL/L — SIGNIFICANT CHANGE UP (ref 96–108)
CK SERPL-CCNC: 84 U/L — SIGNIFICANT CHANGE UP (ref 30–200)
CO2 SERPL-SCNC: 25 MMOL/L — SIGNIFICANT CHANGE UP (ref 22–31)
COLOR SPEC: COLORLESS — SIGNIFICANT CHANGE UP
CREAT SERPL-MCNC: 0.98 MG/DL — SIGNIFICANT CHANGE UP (ref 0.5–1.3)
DIFF PNL FLD: NEGATIVE — SIGNIFICANT CHANGE UP
EOSINOPHIL # BLD AUTO: 0.2 K/UL — SIGNIFICANT CHANGE UP (ref 0–0.5)
EOSINOPHIL NFR BLD AUTO: 3.2 % — SIGNIFICANT CHANGE UP (ref 0–6)
GLUCOSE SERPL-MCNC: 155 MG/DL — HIGH (ref 70–99)
GLUCOSE UR QL: NEGATIVE — SIGNIFICANT CHANGE UP
HCT VFR BLD CALC: 40 % — SIGNIFICANT CHANGE UP (ref 39–50)
HGB BLD-MCNC: 14.1 G/DL — SIGNIFICANT CHANGE UP (ref 13–17)
INR BLD: 1.29 RATIO — HIGH (ref 0.88–1.16)
KETONES UR-MCNC: SIGNIFICANT CHANGE UP
LEUKOCYTE ESTERASE UR-ACNC: NEGATIVE — SIGNIFICANT CHANGE UP
LYMPHOCYTES # BLD AUTO: 0.8 K/UL — LOW (ref 1–3.3)
LYMPHOCYTES # BLD AUTO: 11.7 % — LOW (ref 13–44)
MAGNESIUM SERPL-MCNC: 2.2 MG/DL — SIGNIFICANT CHANGE UP (ref 1.6–2.6)
MCHC RBC-ENTMCNC: 32.8 PG — SIGNIFICANT CHANGE UP (ref 27–34)
MCHC RBC-ENTMCNC: 35.2 GM/DL — SIGNIFICANT CHANGE UP (ref 32–36)
MCV RBC AUTO: 93.1 FL — SIGNIFICANT CHANGE UP (ref 80–100)
MONOCYTES # BLD AUTO: 0.5 K/UL — SIGNIFICANT CHANGE UP (ref 0–0.9)
MONOCYTES NFR BLD AUTO: 8 % — SIGNIFICANT CHANGE UP (ref 2–14)
NEUTROPHILS # BLD AUTO: 5.1 K/UL — SIGNIFICANT CHANGE UP (ref 1.8–7.4)
NEUTROPHILS NFR BLD AUTO: 76.8 % — SIGNIFICANT CHANGE UP (ref 43–77)
NITRITE UR-MCNC: NEGATIVE — SIGNIFICANT CHANGE UP
NT-PROBNP SERPL-SCNC: 1029 PG/ML — HIGH (ref 0–300)
PH UR: 6.5 — SIGNIFICANT CHANGE UP (ref 5–8)
PHOSPHATE SERPL-MCNC: 2.8 MG/DL — SIGNIFICANT CHANGE UP (ref 2.5–4.5)
PLATELET # BLD AUTO: 207 K/UL — SIGNIFICANT CHANGE UP (ref 150–400)
POTASSIUM SERPL-MCNC: 4.3 MMOL/L — SIGNIFICANT CHANGE UP (ref 3.5–5.3)
POTASSIUM SERPL-SCNC: 4.3 MMOL/L — SIGNIFICANT CHANGE UP (ref 3.5–5.3)
PROT SERPL-MCNC: 7 G/DL — SIGNIFICANT CHANGE UP (ref 6–8.3)
PROT UR-MCNC: SIGNIFICANT CHANGE UP
PROTHROM AB SERPL-ACNC: 14.8 SEC — HIGH (ref 10–12.9)
RBC # BLD: 4.29 M/UL — SIGNIFICANT CHANGE UP (ref 4.2–5.8)
RBC # FLD: 12.1 % — SIGNIFICANT CHANGE UP (ref 10.3–14.5)
SODIUM SERPL-SCNC: 140 MMOL/L — SIGNIFICANT CHANGE UP (ref 135–145)
SP GR SPEC: 1.01 — SIGNIFICANT CHANGE UP (ref 1.01–1.02)
TROPONIN T, HIGH SENSITIVITY RESULT: 23 NG/L — SIGNIFICANT CHANGE UP (ref 0–51)
UROBILINOGEN FLD QL: NEGATIVE — SIGNIFICANT CHANGE UP
WBC # BLD: 6.6 K/UL — SIGNIFICANT CHANGE UP (ref 3.8–10.5)
WBC # FLD AUTO: 6.6 K/UL — SIGNIFICANT CHANGE UP (ref 3.8–10.5)

## 2019-03-15 PROCEDURE — 93010 ELECTROCARDIOGRAM REPORT: CPT

## 2019-03-15 PROCEDURE — 71045 X-RAY EXAM CHEST 1 VIEW: CPT

## 2019-03-15 PROCEDURE — 85730 THROMBOPLASTIN TIME PARTIAL: CPT

## 2019-03-15 PROCEDURE — 82550 ASSAY OF CK (CPK): CPT

## 2019-03-15 PROCEDURE — 85610 PROTHROMBIN TIME: CPT

## 2019-03-15 PROCEDURE — 71045 X-RAY EXAM CHEST 1 VIEW: CPT | Mod: 26

## 2019-03-15 PROCEDURE — 99284 EMERGENCY DEPT VISIT MOD MDM: CPT | Mod: 25

## 2019-03-15 PROCEDURE — 84100 ASSAY OF PHOSPHORUS: CPT

## 2019-03-15 PROCEDURE — 81003 URINALYSIS AUTO W/O SCOPE: CPT

## 2019-03-15 PROCEDURE — 84484 ASSAY OF TROPONIN QUANT: CPT

## 2019-03-15 PROCEDURE — 93005 ELECTROCARDIOGRAM TRACING: CPT

## 2019-03-15 PROCEDURE — 82962 GLUCOSE BLOOD TEST: CPT

## 2019-03-15 PROCEDURE — 85027 COMPLETE CBC AUTOMATED: CPT

## 2019-03-15 PROCEDURE — 80053 COMPREHEN METABOLIC PANEL: CPT

## 2019-03-15 PROCEDURE — 83735 ASSAY OF MAGNESIUM: CPT

## 2019-03-15 PROCEDURE — 83880 ASSAY OF NATRIURETIC PEPTIDE: CPT

## 2019-03-15 PROCEDURE — 87086 URINE CULTURE/COLONY COUNT: CPT

## 2019-03-15 NOTE — ED PROVIDER NOTE - PROGRESS NOTE DETAILS
Discussed results with patient. Offered admission. Patient declined admission and will follow up with his cariologist ED diagnostics without emergent abnormality. Does have an element of B/L LE edema c/ a BNP >1000 but no e/o L heart failure and he had an essentially normal ECHO in January that included an EF of 60%. With his symptoms ongoing for at least one month, there is nothing in his test results that suggest an emergent process. Wife expressed concern about his "deterioration" over the course of the past several weeks. For this reason, he was offered admission but did not want to stay. As such, he was discharged as requested with appropriate instruction, results and follow-up.

## 2019-03-15 NOTE — ED PROVIDER NOTE - CARE PROVIDER_API CALL
Carissa Avery)  Cardiovascular Disease; Interventional Cardiology  [address] [place] [address]  Phone: [phone]  Fax: [phone]  Follow Up Time: 1-3 Days

## 2019-03-15 NOTE — ED PROVIDER NOTE - ATTENDING CONTRIBUTION TO CARE
79 y/o male with the above documented history and HPI who on exam appears well and comfortable. VSs noted, head NC/AT, EOMsI, neck supple, lungs CTA, cardiac sounds s/ audible m/r/g, abdomen soft, NT/ND, extremities s/ asymmetry, skin s/ rash and neurologically intact c/ equal strength and sensation s/ ataxia. Full investigation initiated, the results of which will likely determine his ultimate treatment and disposition with nothing clinically evident at this point to suggest any acute or emergent process.

## 2019-03-15 NOTE — ED ADULT NURSE NOTE - INTERVENTIONS DEFINITIONS
[place] to call system/Provide visual cue, wrist band, yellow gown, etc./Stretcher in lowest position, wheels locked, appropriate side rails in place/Physically safe environment: no spills, clutter or unnecessary equipment/Call bell, personal items and telephone within reach/Instruct patient to call for assistance

## 2019-03-15 NOTE — ED PROVIDER NOTE - OBJECTIVE STATEMENT
77 yo M with pmhx htn, dm, hld, bph and afib(ICD placed in Jan) presenting with "weakness" x two weeks.  Patient states he has been "deteriorating" and feeling fatigue constantly for the past two weeks. Patient states he has been having bodyaches and feels weak when ambulating. When asking about ambulating patient states he feels pain in his entire legs and spine. Patient also states he noticed increased swelling this week to his bl LE. Patient states he called his Cardiologist today but they said to come here. Patient admits to increased frequency in urination. Patient denies cp, sob, fever, cough, tingling, numbness, abd pain, nvd, dysuria, hematuria, and weight loss

## 2019-03-15 NOTE — ED ADULT NURSE NOTE - OBJECTIVE STATEMENT
77 y/o M, A&Ox4, enters ED via wheelchair w/ c/o increasing weakness. Pt. recently had ICD placed in January. Pt. reports weakness started a few weeks ago and has gotten increasingly worse in the lower extremities. Pt. had a fall in Jan. and reports weakness has gotten worse since then. Pt. reports pain when walking and reports feeling "stiff." Pt. endorses "joint/muscle" pain. Pt. also reports worsening fatigue. Pt. is normally able to ambulate and move around independently, however, pt. presents w/ difficulty sitting up in bed w/o assistance. Pt. also reports edema in the lower extremities - no pitting edema. No hx. of CHF. Pt. denies weakness in upper extremities. Pt. moves all extremities. Positive strength and sensation in all extremities. No numbness/tingling. Pupils 3 mm in size, PERRL. No fever/chills. No cough. No chest pain. No SOB. No recent travel. Pt. on Eliquis. No dizziness. No syncope/falls. No dysuria/hematuria. No blood in stool. No abdominal pain. Abdomen soft, round, nontender. No n/v. Lung sounds clear bilaterally. FS done. EKG done. Pt. placed on CM - atrial paced to 60. Neuro flow sheet initiated and in chart. Dysphagia screen done. Skin warm, dry and intact. Wife at bedside.

## 2019-03-15 NOTE — ED PROVIDER NOTE - CARE PROVIDERS DIRECT ADDRESSES
[email or web address]

## 2019-03-16 LAB
CULTURE RESULTS: NO GROWTH — SIGNIFICANT CHANGE UP
SPECIMEN SOURCE: SIGNIFICANT CHANGE UP

## 2019-03-17 ENCOUNTER — INPATIENT (INPATIENT)
Facility: HOSPITAL | Age: 79
LOS: 7 days | Discharge: HOME | DRG: 25 | End: 2019-03-25
Attending: STUDENT IN AN ORGANIZED HEALTH CARE EDUCATION/TRAINING PROGRAM | Admitting: STUDENT IN AN ORGANIZED HEALTH CARE EDUCATION/TRAINING PROGRAM
Payer: COMMERCIAL

## 2019-03-17 VITALS
TEMPERATURE: 98 F | HEIGHT: 67 IN | DIASTOLIC BLOOD PRESSURE: 81 MMHG | OXYGEN SATURATION: 98 % | WEIGHT: 186.95 LBS | RESPIRATION RATE: 19 BRPM | SYSTOLIC BLOOD PRESSURE: 158 MMHG | HEART RATE: 62 BPM

## 2019-03-17 DIAGNOSIS — Z98.890 OTHER SPECIFIED POSTPROCEDURAL STATES: Chronic | ICD-10-CM

## 2019-03-17 DIAGNOSIS — Z90.79 ACQUIRED ABSENCE OF OTHER GENITAL ORGAN(S): Chronic | ICD-10-CM

## 2019-03-17 DIAGNOSIS — S06.5X9A TRAUMATIC SUBDURAL HEMORRHAGE WITH LOSS OF CONSCIOUSNESS OF UNSPECIFIED DURATION, INITIAL ENCOUNTER: ICD-10-CM

## 2019-03-17 LAB
ALBUMIN SERPL ELPH-MCNC: 4.2 G/DL — SIGNIFICANT CHANGE UP (ref 3.3–5)
ALP SERPL-CCNC: 65 U/L — SIGNIFICANT CHANGE UP (ref 40–120)
ALT FLD-CCNC: 10 U/L — SIGNIFICANT CHANGE UP (ref 10–45)
ANION GAP SERPL CALC-SCNC: 12 MMOL/L — SIGNIFICANT CHANGE UP (ref 5–17)
APPEARANCE UR: CLEAR — SIGNIFICANT CHANGE UP
APTT BLD: 35.9 SEC — SIGNIFICANT CHANGE UP (ref 27.5–36.3)
AST SERPL-CCNC: 56 U/L — HIGH (ref 10–40)
BACTERIA # UR AUTO: 0 — SIGNIFICANT CHANGE UP
BASOPHILS # BLD AUTO: 0 K/UL — SIGNIFICANT CHANGE UP (ref 0–0.2)
BASOPHILS NFR BLD AUTO: 0.2 % — SIGNIFICANT CHANGE UP (ref 0–2)
BILIRUB SERPL-MCNC: 0.8 MG/DL — SIGNIFICANT CHANGE UP (ref 0.2–1.2)
BILIRUB UR-MCNC: NEGATIVE — SIGNIFICANT CHANGE UP
BUN SERPL-MCNC: 27 MG/DL — HIGH (ref 7–23)
CALCIUM SERPL-MCNC: 9.6 MG/DL — SIGNIFICANT CHANGE UP (ref 8.4–10.5)
CHLORIDE SERPL-SCNC: 97 MMOL/L — SIGNIFICANT CHANGE UP (ref 96–108)
CO2 SERPL-SCNC: 23 MMOL/L — SIGNIFICANT CHANGE UP (ref 22–31)
COLOR SPEC: SIGNIFICANT CHANGE UP
CREAT SERPL-MCNC: 0.92 MG/DL — SIGNIFICANT CHANGE UP (ref 0.5–1.3)
DIFF PNL FLD: NEGATIVE — SIGNIFICANT CHANGE UP
EOSINOPHIL # BLD AUTO: 0.1 K/UL — SIGNIFICANT CHANGE UP (ref 0–0.5)
EOSINOPHIL NFR BLD AUTO: 2 % — SIGNIFICANT CHANGE UP (ref 0–6)
EPI CELLS # UR: 0 /HPF — SIGNIFICANT CHANGE UP
GLUCOSE SERPL-MCNC: 165 MG/DL — HIGH (ref 70–99)
GLUCOSE UR QL: NEGATIVE — SIGNIFICANT CHANGE UP
HCT VFR BLD CALC: 40.6 % — SIGNIFICANT CHANGE UP (ref 39–50)
HGB BLD-MCNC: 14 G/DL — SIGNIFICANT CHANGE UP (ref 13–17)
HYALINE CASTS # UR AUTO: 0 /LPF — SIGNIFICANT CHANGE UP (ref 0–2)
INR BLD: 1.21 RATIO — HIGH (ref 0.88–1.16)
KETONES UR-MCNC: NEGATIVE — SIGNIFICANT CHANGE UP
LEUKOCYTE ESTERASE UR-ACNC: NEGATIVE — SIGNIFICANT CHANGE UP
LYMPHOCYTES # BLD AUTO: 0.8 K/UL — LOW (ref 1–3.3)
LYMPHOCYTES # BLD AUTO: 12.8 % — LOW (ref 13–44)
MAGNESIUM SERPL-MCNC: 2.2 MG/DL — SIGNIFICANT CHANGE UP (ref 1.6–2.6)
MCHC RBC-ENTMCNC: 32.5 PG — SIGNIFICANT CHANGE UP (ref 27–34)
MCHC RBC-ENTMCNC: 34.6 GM/DL — SIGNIFICANT CHANGE UP (ref 32–36)
MCV RBC AUTO: 94.1 FL — SIGNIFICANT CHANGE UP (ref 80–100)
MONOCYTES # BLD AUTO: 0.7 K/UL — SIGNIFICANT CHANGE UP (ref 0–0.9)
MONOCYTES NFR BLD AUTO: 10.5 % — SIGNIFICANT CHANGE UP (ref 2–14)
NEUTROPHILS # BLD AUTO: 5 K/UL — SIGNIFICANT CHANGE UP (ref 1.8–7.4)
NEUTROPHILS NFR BLD AUTO: 74.5 % — SIGNIFICANT CHANGE UP (ref 43–77)
NITRITE UR-MCNC: NEGATIVE — SIGNIFICANT CHANGE UP
PH UR: 7 — SIGNIFICANT CHANGE UP (ref 5–8)
PLATELET # BLD AUTO: 206 K/UL — SIGNIFICANT CHANGE UP (ref 150–400)
POTASSIUM SERPL-MCNC: 8.7 MMOL/L — CRITICAL HIGH (ref 3.5–5.3)
POTASSIUM SERPL-SCNC: 8.7 MMOL/L — CRITICAL HIGH (ref 3.5–5.3)
PROT SERPL-MCNC: 7.3 G/DL — SIGNIFICANT CHANGE UP (ref 6–8.3)
PROT UR-MCNC: NEGATIVE — SIGNIFICANT CHANGE UP
PROTHROM AB SERPL-ACNC: 13.9 SEC — HIGH (ref 10–12.9)
RBC # BLD: 4.32 M/UL — SIGNIFICANT CHANGE UP (ref 4.2–5.8)
RBC # FLD: 11.9 % — SIGNIFICANT CHANGE UP (ref 10.3–14.5)
RBC CASTS # UR COMP ASSIST: 1 /HPF — SIGNIFICANT CHANGE UP (ref 0–4)
SODIUM SERPL-SCNC: 132 MMOL/L — LOW (ref 135–145)
SP GR SPEC: 1.01 — SIGNIFICANT CHANGE UP (ref 1.01–1.02)
UROBILINOGEN FLD QL: NEGATIVE — SIGNIFICANT CHANGE UP
WBC # BLD: 6.6 K/UL — SIGNIFICANT CHANGE UP (ref 3.8–10.5)
WBC # FLD AUTO: 6.6 K/UL — SIGNIFICANT CHANGE UP (ref 3.8–10.5)
WBC UR QL: 0 /HPF — SIGNIFICANT CHANGE UP (ref 0–5)

## 2019-03-17 PROCEDURE — ZZZZZ: CPT

## 2019-03-17 PROCEDURE — 72170 X-RAY EXAM OF PELVIS: CPT | Mod: 26

## 2019-03-17 PROCEDURE — 99285 EMERGENCY DEPT VISIT HI MDM: CPT

## 2019-03-17 PROCEDURE — 70450 CT HEAD/BRAIN W/O DYE: CPT | Mod: 26

## 2019-03-17 PROCEDURE — 73110 X-RAY EXAM OF WRIST: CPT | Mod: 26,RT,76

## 2019-03-17 PROCEDURE — 71045 X-RAY EXAM CHEST 1 VIEW: CPT | Mod: 26

## 2019-03-17 PROCEDURE — 73090 X-RAY EXAM OF FOREARM: CPT | Mod: 26,LT

## 2019-03-17 RX ORDER — LEVETIRACETAM 250 MG/1
500 TABLET, FILM COATED ORAL EVERY 12 HOURS
Qty: 0 | Refills: 0 | Status: DISCONTINUED | OUTPATIENT
Start: 2019-03-17 | End: 2019-03-20

## 2019-03-17 RX ORDER — LISINOPRIL 2.5 MG/1
40 TABLET ORAL DAILY
Qty: 0 | Refills: 0 | Status: DISCONTINUED | OUTPATIENT
Start: 2019-03-17 | End: 2019-03-20

## 2019-03-17 RX ORDER — INSULIN LISPRO 100/ML
VIAL (ML) SUBCUTANEOUS AT BEDTIME
Qty: 0 | Refills: 0 | Status: DISCONTINUED | OUTPATIENT
Start: 2019-03-17 | End: 2019-03-20

## 2019-03-17 RX ORDER — GLUCAGON INJECTION, SOLUTION 0.5 MG/.1ML
1 INJECTION, SOLUTION SUBCUTANEOUS ONCE
Qty: 0 | Refills: 0 | Status: DISCONTINUED | OUTPATIENT
Start: 2019-03-17 | End: 2019-03-20

## 2019-03-17 RX ORDER — ACETAMINOPHEN 500 MG
650 TABLET ORAL EVERY 6 HOURS
Qty: 0 | Refills: 0 | Status: DISCONTINUED | OUTPATIENT
Start: 2019-03-17 | End: 2019-03-20

## 2019-03-17 RX ORDER — DEXTROSE 50 % IN WATER 50 %
25 SYRINGE (ML) INTRAVENOUS ONCE
Qty: 0 | Refills: 0 | Status: DISCONTINUED | OUTPATIENT
Start: 2019-03-17 | End: 2019-03-20

## 2019-03-17 RX ORDER — AMLODIPINE BESYLATE 2.5 MG/1
10 TABLET ORAL DAILY
Qty: 0 | Refills: 0 | Status: DISCONTINUED | OUTPATIENT
Start: 2019-03-17 | End: 2019-03-20

## 2019-03-17 RX ORDER — SENNA PLUS 8.6 MG/1
2 TABLET ORAL AT BEDTIME
Qty: 0 | Refills: 0 | Status: DISCONTINUED | OUTPATIENT
Start: 2019-03-17 | End: 2019-03-19

## 2019-03-17 RX ORDER — DOCUSATE SODIUM 100 MG
100 CAPSULE ORAL THREE TIMES A DAY
Qty: 0 | Refills: 0 | Status: DISCONTINUED | OUTPATIENT
Start: 2019-03-17 | End: 2019-03-20

## 2019-03-17 RX ORDER — ONDANSETRON 8 MG/1
4 TABLET, FILM COATED ORAL EVERY 6 HOURS
Qty: 0 | Refills: 0 | Status: DISCONTINUED | OUTPATIENT
Start: 2019-03-17 | End: 2019-03-19

## 2019-03-17 RX ORDER — INSULIN LISPRO 100/ML
VIAL (ML) SUBCUTANEOUS
Qty: 0 | Refills: 0 | Status: DISCONTINUED | OUTPATIENT
Start: 2019-03-17 | End: 2019-03-20

## 2019-03-17 RX ORDER — DEXTROSE 50 % IN WATER 50 %
12.5 SYRINGE (ML) INTRAVENOUS ONCE
Qty: 0 | Refills: 0 | Status: DISCONTINUED | OUTPATIENT
Start: 2019-03-17 | End: 2019-03-20

## 2019-03-17 RX ORDER — SODIUM CHLORIDE 9 MG/ML
3 INJECTION INTRAMUSCULAR; INTRAVENOUS; SUBCUTANEOUS EVERY 8 HOURS
Qty: 0 | Refills: 0 | Status: DISCONTINUED | OUTPATIENT
Start: 2019-03-17 | End: 2019-03-20

## 2019-03-17 RX ORDER — DEXTROSE 50 % IN WATER 50 %
15 SYRINGE (ML) INTRAVENOUS ONCE
Qty: 0 | Refills: 0 | Status: DISCONTINUED | OUTPATIENT
Start: 2019-03-17 | End: 2019-03-20

## 2019-03-17 RX ORDER — SODIUM CHLORIDE 9 MG/ML
1000 INJECTION, SOLUTION INTRAVENOUS
Qty: 0 | Refills: 0 | Status: DISCONTINUED | OUTPATIENT
Start: 2019-03-17 | End: 2019-03-20

## 2019-03-17 RX ORDER — FENTANYL CITRATE 50 UG/ML
25 INJECTION INTRAVENOUS ONCE
Qty: 0 | Refills: 0 | Status: DISCONTINUED | OUTPATIENT
Start: 2019-03-17 | End: 2019-03-17

## 2019-03-17 RX ORDER — INFLUENZA VIRUS VACCINE 15; 15; 15; 15 UG/.5ML; UG/.5ML; UG/.5ML; UG/.5ML
0.5 SUSPENSION INTRAMUSCULAR ONCE
Qty: 0 | Refills: 0 | Status: COMPLETED | OUTPATIENT
Start: 2019-03-17 | End: 2019-03-17

## 2019-03-17 RX ORDER — SOTALOL HCL 120 MG
120 TABLET ORAL
Qty: 0 | Refills: 0 | Status: DISCONTINUED | OUTPATIENT
Start: 2019-03-17 | End: 2019-03-20

## 2019-03-17 RX ORDER — SIMVASTATIN 20 MG/1
10 TABLET, FILM COATED ORAL AT BEDTIME
Qty: 0 | Refills: 0 | Status: DISCONTINUED | OUTPATIENT
Start: 2019-03-17 | End: 2019-03-18

## 2019-03-17 RX ADMIN — Medication 120 MILLIGRAM(S): at 15:31

## 2019-03-17 RX ADMIN — LISINOPRIL 40 MILLIGRAM(S): 2.5 TABLET ORAL at 15:30

## 2019-03-17 RX ADMIN — Medication 100 MILLIGRAM(S): at 15:38

## 2019-03-17 RX ADMIN — SODIUM CHLORIDE 3 MILLILITER(S): 9 INJECTION INTRAMUSCULAR; INTRAVENOUS; SUBCUTANEOUS at 22:29

## 2019-03-17 RX ADMIN — FENTANYL CITRATE 25 MICROGRAM(S): 50 INJECTION INTRAVENOUS at 11:30

## 2019-03-17 RX ADMIN — FENTANYL CITRATE 25 MICROGRAM(S): 50 INJECTION INTRAVENOUS at 11:01

## 2019-03-17 RX ADMIN — Medication 1 MILLIGRAM(S): at 21:56

## 2019-03-17 RX ADMIN — Medication 100 MILLIGRAM(S): at 21:56

## 2019-03-17 RX ADMIN — AMLODIPINE BESYLATE 10 MILLIGRAM(S): 2.5 TABLET ORAL at 15:30

## 2019-03-17 RX ADMIN — SODIUM CHLORIDE 3 MILLILITER(S): 9 INJECTION INTRAMUSCULAR; INTRAVENOUS; SUBCUTANEOUS at 15:43

## 2019-03-17 RX ADMIN — LEVETIRACETAM 500 MILLIGRAM(S): 250 TABLET, FILM COATED ORAL at 17:47

## 2019-03-17 NOTE — ED PROVIDER NOTE - ATTENDING CONTRIBUTION TO CARE
79 y/o f with pmhx HTN, HLD, DM type 2, afib s/p AICD in Jan presents for complain of wrist pain on right hand. pain began last night at around 12 am when he fell out of bed leaning forward to scratch his back. does not recall head trauma. wife who is a the bedside found him laying on ground next to bed with head on ground. no LOC per wife.  patient had overall weakness and unable to get up. no vomiting. no chest pain. had recent fall as well on [place] and was seen here as well. patient and wife state since he was admitted for AICD has continued to decline.   Gen.  no acute distress  HEENT:  PERRL EOMI nc/at no raccoon no king sign  Lungs:  b/l bs  CVS: S1S2   Abd;  soft non tender, no distention  Ext: right wrist mild edema and ecchymosis, tender over wrist, also with snuff box tend. no tend with axial loading. pulses 2+ radial b/l. full range of motion. moving all fingers and sensation intact.   b/l lower ext pitting edema. no erythema  Neuro: aaox3 clear speech  MSK: strength 5/5 x 4 ext

## 2019-03-17 NOTE — ED PROVIDER NOTE - PHYSICAL EXAMINATION
Gen: NAD, AOx3  Head: NCAT  HEENT: PERRL, EOMI, oral mucosa moist, normal conjunctiva  Lung: CTAB, no respiratory distress, no wheezing, rales, rhonchi  CV: normal s1/s2, rrr, no murmurs, Normal perfusion, pulses 2+ throughout  Abd: soft, NTND  MSK: +Deformity to right wrist, no other edema/deformity   Neuro: No focal neurologic deficits, strength 5/5 bilateral lower extremities, sensation intact  Skin: No rash   Psych: normal affect

## 2019-03-17 NOTE — H&P ADULT - NSICDXPASTSURGICALHX_GEN_ALL_CORE_FT
PAST SURGICAL HISTORY:  H/O colonoscopy with polypectomy     H/O left inguinal hernia repair with mesh 15 years ago    History of prostate surgery s/p prostate microwave therapy    S/P TURP (status post transurethral resection of prostate)

## 2019-03-17 NOTE — ED ADULT NURSE NOTE - PRIMARY CARE PROVIDER
Multiple attempts made to schedule patient for a colonoscopy. No further attempts will be made to contact him/her at this time. Please send a new referral when patient is ready to schedule.     Dr. Lay

## 2019-03-17 NOTE — H&P ADULT - NSICDXPASTMEDICALHX_GEN_ALL_CORE_FT
PAST MEDICAL HISTORY:  HLD (hyperlipidemia)     HTN (hypertension)     Overactive bladder     Type 2 diabetes mellitus     Urinary frequency     Urinary urgency

## 2019-03-17 NOTE — H&P ADULT - ASSESSMENT
Jeanne Rick  78M pmhx afib s/p AICD on eliquis s/p multiple falls since January of this year with worsening gait over 2-3 weeks found to have R>L B/l acute on chronic SDH. On exam LUE pronator drift, shuffling gait, RUE mild resting tremor, otherwise Intact.  - Admit to floor  - repeat CTH in AM  - hold eliquis  - Plan for OR on wednesday  - Medicine / Cards clearance  - Neurology eval for new resting tremor and shuffling gait

## 2019-03-17 NOTE — ED ADULT NURSE NOTE - OBJECTIVE STATEMENT
77 y/o male c/o rt wrist pain s/p mechanical fall this am.  Patient states that he woke up this morning to scratch his back,  so he lost his balance and fell onto the floor on his right wrist.    Pt does not think he hit his head, but reports no LOC.  Pt on Eliquis for AICD.  Pt states for last 2-3 weeks he has had difficulty ambulating at home, and moving very slowly, has had to use wheelchair at home.  No numbness/tingling, no weakness, no CP/SOB. 79 y/o male c/o rt wrist pain s/p mechanical fall this am.  Patient states that he woke up this morning to scratch his back,  so he lost his balance and fell onto the floor on his right wrist.    Pt does not think he hit his head, but reports no LOC.  Pt on Eliquis for AICD.  Pt states for last 2-3 weeks he has had difficulty ambulating at home, and moving very slowly, has had to use wheelchair at home.  No numbness/tingling, no weakness, no CP/SOB.  (+)Deformity to right wrist, no other edema, sensation intact, pt able to move his fingers.

## 2019-03-17 NOTE — ED ADULT NURSE NOTE - NSIMPLEMENTINTERV_GEN_ALL_ED
Implemented All Fall with Harm Risk Interventions:  [place] to call system. Call bell, personal items and telephone within reach. Instruct patient to call for assistance. Room bathroom lighting operational. Non-slip footwear when patient is off stretcher. Physically safe environment: no spills, clutter or unnecessary equipment. Stretcher in lowest position, wheels locked, appropriate side rails in place. Provide visual cue, wrist band, yellow gown, etc. Monitor gait and stability. Monitor for mental status changes and reorient to person, place, and time. Review medications for side effects contributing to fall risk. Reinforce activity limits and safety measures with patient and family. Provide visual clues: red socks.

## 2019-03-17 NOTE — H&P ADULT - HISTORY OF PRESENT ILLNESS
Jeanne Rick  78M pmhx afib s/p AICD on eliquis s/p multiple falls since January of this year with worsening gait over 2-3 weeks found to have R>L B/l acute on chronic SDH. On exam LUE pronator drift, shuffling gait, RUE mild resting tremor, otherwise Intact.

## 2019-03-17 NOTE — ED PROVIDER NOTE - CLINICAL SUMMARY MEDICAL DECISION MAKING FREE TEXT BOX
ATTG: : fall with ? head trauma and wrist pain, check xray r/o fracture, check ct head, check labs, as patient with recurrent falls and concern for infection / UTI /electrolyte abn, causing globally weakness and falls.

## 2019-03-17 NOTE — H&P ADULT - ATTENDING COMMENTS
Patient seen and examined  Agree with above  OR planned for 3/20  Discussed risks, benefits, and alternatives with patient and family

## 2019-03-17 NOTE — H&P ADULT - NSHPPHYSICALEXAM_GEN_ALL_CORE
AOx3, FC, PERRL, EOMI, no facial   R wrist pain limited 2/2 fx, LUE pronator / mild drift, otherwise 5/5  Shuffling gait, RUE mild resting tremor  SILT  no clonus

## 2019-03-18 DIAGNOSIS — M79.10 MYALGIA, UNSPECIFIED SITE: ICD-10-CM
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
## 2019-03-18 DIAGNOSIS — E87.5 HYPERKALEMIA: ICD-10-CM

## 2019-03-18 DIAGNOSIS — I48.91 UNSPECIFIED ATRIAL FIBRILLATION: ICD-10-CM

## 2019-03-18 DIAGNOSIS — E11.9 TYPE 2 DIABETES MELLITUS WITHOUT COMPLICATIONS: ICD-10-CM

## 2019-03-18 DIAGNOSIS — I42.1 OBSTRUCTIVE HYPERTROPHIC CARDIOMYOPATHY: ICD-10-CM

## 2019-03-18 DIAGNOSIS — S06.5X9A TRAUMATIC SUBDURAL HEMORRHAGE WITH LOSS OF CONSCIOUSNESS OF UNSPECIFIED DURATION, INITIAL ENCOUNTER: ICD-10-CM

## 2019-03-18 DIAGNOSIS — I10 ESSENTIAL (PRIMARY) HYPERTENSION: ICD-10-CM

## 2019-03-18 DIAGNOSIS — E87.1 HYPO-OSMOLALITY AND HYPONATREMIA: ICD-10-CM

## 2019-03-18 LAB
ANION GAP SERPL CALC-SCNC: 12 MMOL/L — SIGNIFICANT CHANGE UP (ref 5–17)
BUN SERPL-MCNC: 19 MG/DL — SIGNIFICANT CHANGE UP (ref 7–23)
CALCIUM SERPL-MCNC: 9.3 MG/DL — SIGNIFICANT CHANGE UP (ref 8.4–10.5)
CHLORIDE SERPL-SCNC: 103 MMOL/L — SIGNIFICANT CHANGE UP (ref 96–108)
CK SERPL-CCNC: 82 U/L — SIGNIFICANT CHANGE UP (ref 30–200)
CO2 SERPL-SCNC: 23 MMOL/L — SIGNIFICANT CHANGE UP (ref 22–31)
CREAT SERPL-MCNC: 0.82 MG/DL — SIGNIFICANT CHANGE UP (ref 0.5–1.3)
GLUCOSE SERPL-MCNC: 191 MG/DL — HIGH (ref 70–99)
HBA1C BLD-MCNC: 7.5 % — HIGH (ref 4–5.6)
POTASSIUM SERPL-MCNC: 3.8 MMOL/L — SIGNIFICANT CHANGE UP (ref 3.5–5.3)
POTASSIUM SERPL-SCNC: 3.8 MMOL/L — SIGNIFICANT CHANGE UP (ref 3.5–5.3)
SODIUM SERPL-SCNC: 138 MMOL/L — SIGNIFICANT CHANGE UP (ref 135–145)

## 2019-03-18 PROCEDURE — 99223 1ST HOSP IP/OBS HIGH 75: CPT

## 2019-03-18 PROCEDURE — 99024 POSTOP FOLLOW-UP VISIT: CPT

## 2019-03-18 PROCEDURE — 93970 EXTREMITY STUDY: CPT | Mod: 26

## 2019-03-18 PROCEDURE — 70450 CT HEAD/BRAIN W/O DYE: CPT | Mod: 26

## 2019-03-18 PROCEDURE — 99222 1ST HOSP IP/OBS MODERATE 55: CPT

## 2019-03-18 RX ORDER — POLYETHYLENE GLYCOL 3350 17 G/17G
17 POWDER, FOR SOLUTION ORAL EVERY 24 HOURS
Qty: 0 | Refills: 0 | Status: DISCONTINUED | OUTPATIENT
Start: 2019-03-18 | End: 2019-03-20

## 2019-03-18 RX ORDER — GLYBURIDE-METFORMIN HYDROCHLORIDE 1.25; 25 MG/1; MG/1
1 TABLET ORAL
Qty: 0 | Refills: 0 | COMMUNITY

## 2019-03-18 RX ORDER — SENNA PLUS 8.6 MG/1
2 TABLET ORAL AT BEDTIME
Qty: 0 | Refills: 0 | Status: DISCONTINUED | OUTPATIENT
Start: 2019-03-18 | End: 2019-03-20

## 2019-03-18 RX ORDER — QUINAPRIL HYDROCHLORIDE 40 MG/1
1 TABLET, FILM COATED ORAL
Qty: 0 | Refills: 0 | COMMUNITY

## 2019-03-18 RX ORDER — PIOGLITAZONE HYDROCHLORIDE 15 MG/1
1 TABLET ORAL
Qty: 0 | Refills: 0 | COMMUNITY

## 2019-03-18 RX ORDER — CELECOXIB 200 MG/1
1 CAPSULE ORAL
Qty: 0 | Refills: 0 | COMMUNITY

## 2019-03-18 RX ORDER — DIPHENHYDRAMINE HCL 50 MG
25 CAPSULE ORAL EVERY 4 HOURS
Qty: 0 | Refills: 0 | Status: DISCONTINUED | OUTPATIENT
Start: 2019-03-18 | End: 2019-03-20

## 2019-03-18 RX ORDER — HYDRALAZINE HCL 50 MG
10 TABLET ORAL ONCE
Qty: 0 | Refills: 0 | Status: COMPLETED | OUTPATIENT
Start: 2019-03-18 | End: 2019-03-18

## 2019-03-18 RX ADMIN — Medication 5 MILLIGRAM(S): at 22:32

## 2019-03-18 RX ADMIN — Medication 100 MILLIGRAM(S): at 06:25

## 2019-03-18 RX ADMIN — Medication 1 MILLIGRAM(S): at 22:33

## 2019-03-18 RX ADMIN — SODIUM CHLORIDE 3 MILLILITER(S): 9 INJECTION INTRAMUSCULAR; INTRAVENOUS; SUBCUTANEOUS at 13:16

## 2019-03-18 RX ADMIN — LEVETIRACETAM 500 MILLIGRAM(S): 250 TABLET, FILM COATED ORAL at 17:35

## 2019-03-18 RX ADMIN — LISINOPRIL 40 MILLIGRAM(S): 2.5 TABLET ORAL at 06:26

## 2019-03-18 RX ADMIN — Medication 10 MILLIGRAM(S): at 22:33

## 2019-03-18 RX ADMIN — AMLODIPINE BESYLATE 10 MILLIGRAM(S): 2.5 TABLET ORAL at 06:26

## 2019-03-18 RX ADMIN — SODIUM CHLORIDE 3 MILLILITER(S): 9 INJECTION INTRAMUSCULAR; INTRAVENOUS; SUBCUTANEOUS at 06:41

## 2019-03-18 RX ADMIN — SENNA PLUS 2 TABLET(S): 8.6 TABLET ORAL at 22:32

## 2019-03-18 RX ADMIN — Medication 120 MILLIGRAM(S): at 17:34

## 2019-03-18 RX ADMIN — Medication 120 MILLIGRAM(S): at 06:26

## 2019-03-18 RX ADMIN — Medication 1: at 17:34

## 2019-03-18 RX ADMIN — LEVETIRACETAM 500 MILLIGRAM(S): 250 TABLET, FILM COATED ORAL at 06:25

## 2019-03-18 RX ADMIN — Medication 100 MILLIGRAM(S): at 22:32

## 2019-03-18 RX ADMIN — SODIUM CHLORIDE 3 MILLILITER(S): 9 INJECTION INTRAMUSCULAR; INTRAVENOUS; SUBCUTANEOUS at 21:24

## 2019-03-18 RX ADMIN — Medication 1: at 13:15

## 2019-03-18 RX ADMIN — Medication 100 MILLIGRAM(S): at 17:37

## 2019-03-18 NOTE — CONSULT NOTE ADULT - ASSESSMENT
78M sp Fall with R distal radius fracture    ·	NWB RUE  ·	Cast precautions:  ·	Keep cast dry  ·	Elevate extremity, can try and ice through the cast  ·	Do not stick anything into the cast  ·	Monitor for signs of pressure build up from swelling: pain not controlled with Tylenol/motrin, severe pain when moves the fingers/toes, numbness/tingling  ·	Follow up with Dr. Lanza in 1-2 weeks    [facility]
78M pmhx DM 2, afib, HOCM s/p AICD 1/2019, on eliquis p/w acute on chronic b/l SDHs after a fall to his right side.

## 2019-03-18 NOTE — CONSULT NOTE ADULT - NSICDXPROBLEM_GEN_ALL_CORE_FT
PROBLEM DIAGNOSES  Problem: SDH (subdural hematoma)  Recommendation: see above    Problem: Hyperkalemia  Recommendation: repeat BMP    Problem: HTN (hypertension)  Recommendation: cont home meds. monitor    Problem: Hyponatremia  Recommendation: repeat BMP    Problem: HOCM (hypertrophic obstructive cardiomyopathy)  Recommendation: outpt f/u. check ICD preop.    Problem: Atrial fibrillation  Recommendation: s/p cardioversion 1/2019. monitor    Problem: Diabetes type 2, controlled  Recommendation: monitor fs PROBLEM DIAGNOSES  Problem: SDH (subdural hematoma)  Recommendation: see above    Problem: Hyperkalemia  Recommendation: repeat BMP    Problem: HTN (hypertension)  Recommendation: cont home meds. monitor    Problem: Hyponatremia  Recommendation: repeat BMP    Problem: HOCM (hypertrophic obstructive cardiomyopathy)  Recommendation: outpt f/u. check ICD preop.    Problem: Atrial fibrillation  Recommendation: s/p cardioversion 1/2019. monitor    Problem: Diabetes type 2, controlled  Recommendation: monitor fs    Problem: Myalgia  Recommendation: outpt neuro consult recommended within 2 weeks of discharge PROBLEM DIAGNOSES  Problem: SDH (subdural hematoma)  Recommendation: see above    Problem: Fall  Recommendation: check orthostatics    Problem: Hyperkalemia  Recommendation: repeat BMP    Problem: HTN (hypertension)  Recommendation: cont home meds. monitor    Problem: Hyponatremia  Recommendation: repeat BMP    Problem: HOCM (hypertrophic obstructive cardiomyopathy)  Recommendation: outpt f/u. check ICD preop.    Problem: Atrial fibrillation  Recommendation: s/p cardioversion 1/2019. monitor    Problem: Diabetes type 2, controlled  Recommendation: monitor fs    Problem: Myalgia  Recommendation: outpt neuro consult recommended within 2 weeks of discharge

## 2019-03-18 NOTE — CHART NOTE - NSCHARTNOTEFT_GEN_A_CORE
CAPRINI SCORE [CLOT] Score on Admission for     AGE RELATED RISK FACTORS                                                       MOBILITY RELATED FACTORS  [ ] Age 41-60 years                                            (1 Point)                  [ ] Bed rest                                                        (1 Point)  [ ] Age: 61-74 years                                           (2 Points)                 [ x] Plaster cast                                                   (2 Points)  [x ] Age= 75 years                                              (3 Points)                 [ ] Bed bound for more than 72 hours                 (2 Points)    DISEASE RELATED RISK FACTORS                                               GENDER SPECIFIC FACTORS  [ ] Edema in the lower extremities                       (1 Point)                  [ ] Pregnancy                                                     (1 Point)  [ ] Varicose veins                                               (1 Point)                  [ ] Post-partum < 6 weeks                                   (1 Point)             [ ] BMI > 25 Kg/m2                                            (1 Point)                  [ ] Hormonal therapy  or oral contraception          (1 Point)                 [ ] Sepsis (in the previous month)                        (1 Point)                  [ ] History of pregnancy complications                 (1 point)  [ ] Pneumonia or serious lung disease                                               [ ] Unexplained or recurrent                     (1 Point)           (in the previous month)                               (1 Point)  [ ] Abnormal pulmonary function test                     (1 Point)                 SURGERY RELATED RISK FACTORS (include planned surgeries)  [ ] Acute myocardial infarction                              (1 Point)                 [ ]  Section                                             (1 Point)  [ ] Congestive heart failure (in the previous month)  (1 Point)         [ ] Minor surgery                                                  (1 Point)   [ ] Inflammatory bowel disease                             (1 Point)                 [ ] Arthroscopic surgery                                        (2 Points)  [ ] Central venous access                                      (2 Points)                [ ] General surgery lasting more than 45 minutes   (2 Points)       [ ] Stroke (in the previous month)                          (5 Points)               [ ] Elective arthroplasty                                         (5 Points)            [ ] current or past malignancy                              (2 Points)                                                                                                       HEMATOLOGY RELATED FACTORS                                                 TRAUMA RELATED RISK FACTORS  [ ] Prior episodes of VTE                                     (3 Points)                [ ] Fracture of the hip, pelvis, or leg                       (5 Points)  [ ] Positive family history for VTE                         (3 Points)                 [ ] Acute spinal cord injury (in the previous month)  (5 Points)  [ ] Prothrombin 34141 A                                     (3 Points)                 [ ] Paralysis  (less than 1 month)                             (5 Points)  [ ] Factor V Leiden                                             (3 Points)                  [x ] Multiple Trauma within 1 month                        (5 Points)  [ ] Lupus anticoagulants                                     (3 Points)                                                           [ ] Anticardiolipin antibodies                               (3 Points)                                                       [ ] High homocysteine in the blood                      (3 Points)                                             [ ] Other congenital or acquired thrombophilia      (3 Points)                                                [ ] Heparin induced thrombocytopenia                  (3 Points)                                          Total Score [      10    ]    Risk:  Very low 0   Low 1 to 2   Moderate 3 to 4   High =5       VTE Prophylasix Recommednations:  [ x] mechanical pneumatic compression devices                                      [ ] contraindicated: _____________________  [x] chemo prophylasix                                                                                   [ ] contraindicated _____________________    **** HIGH LIKELIHOOD DVT PRESENT ON ADMISSION  [ ] (please order LE dopplers within 24 hours of admission)

## 2019-03-18 NOTE — CONSULT NOTE ADULT - SUBJECTIVE AND OBJECTIVE BOX
Ravinder Briggs  Pager [phone]  Office [phone]    HPI:  Jeanne Rick  78M pmhx afib s/p AICD on eliquis s/p multiple falls since January of this year with worsening gait over 2-3 weeks found to have R>L B/l acute on chronic SDH. On exam LUE pronator drift, shuffling gait, RUE mild resting tremor, otherwise Intact. (17 Mar 2019 13:26)      PAST MEDICAL & SURGICAL HISTORY:  Urinary urgency  Urinary frequency  HLD (hyperlipidemia)  Overactive bladder  HTN (hypertension)  Type 2 diabetes mellitus  H/O colonoscopy with polypectomy  History of prostate surgery: s/p prostate microwave therapy  S/P TURP (status post transurethral resection of prostate)  H/O left inguinal hernia repair: with mesh 15 years ago      Review of Systems:   CONSTITUTIONAL: No fever, weight loss, or fatigue  EYES: No eye pain, visual disturbances, or discharge  ENMT:  No difficulty hearing, tinnitus, vertigo; No sinus or throat pain  NECK: No pain or stiffness  BREASTS: No pain, masses, or nipple discharge  RESPIRATORY: No cough, wheezing, chills or hemoptysis; No shortness of breath  CARDIOVASCULAR: No chest pain, palpitations, dizziness, or leg swelling  GASTROINTESTINAL: No abdominal or epigastric pain. No nausea, vomiting, or hematemesis; No diarrhea or constipation. No melena or hematochezia.  GENITOURINARY: No dysuria, frequency, hematuria, or incontinence  NEUROLOGICAL: No headaches, memory loss, loss of strength, numbness, or tremors  SKIN: No itching, burning, rashes, or lesions   LYMPH NODES: No enlarged glands  ENDOCRINE: No heat or cold intolerance; No hair loss  MUSCULOSKELETAL: No joint pain or swelling; No muscle, back, or extremity pain  PSYCHIATRIC: No depression, anxiety, mood swings, or difficulty sleeping  HEME/LYMPH: No easy bruising, or bleeding gums  ALLERY AND IMMUNOLOGIC: No hives or eczema    Allergies    No Known Allergies    Intolerances        Social History:     FAMILY HISTORY:  Family history of colon cancer      MEDICATIONS  (STANDING):  amLODIPine   Tablet 10 milliGRAM(s) Oral daily  dextrose 5%. 1000 milliLiter(s) (50 mL/Hr) IV Continuous <Continuous>  dextrose 50% Injectable 12.5 Gram(s) IV Push once  dextrose 50% Injectable 25 Gram(s) IV Push once  dextrose 50% Injectable 25 Gram(s) IV Push once  docusate sodium 100 milliGRAM(s) Oral three times a day  insulin lispro (HumaLOG) corrective regimen sliding scale   SubCutaneous three times a day before meals  insulin lispro (HumaLOG) corrective regimen sliding scale   SubCutaneous at bedtime  levETIRAcetam 500 milliGRAM(s) Oral every 12 hours  lisinopril 40 milliGRAM(s) Oral daily  senna 2 Tablet(s) Oral at bedtime  simvastatin 10 milliGRAM(s) Oral at bedtime  sodium chloride 0.9% lock flush 3 milliLiter(s) IV Push every 8 hours  sotalol 120 milliGRAM(s) Oral two times a day    MEDICATIONS  (PRN):  acetaminophen   Tablet .. 650 milliGRAM(s) Oral every 6 hours PRN Temp greater or equal to 38C (100.4F), Mild Pain (1 - 3)  dextrose 40% Gel 15 Gram(s) Oral once PRN Blood Glucose LESS THAN 70 milliGRAM(s)/deciliter  glucagon  Injectable 1 milliGRAM(s) IntraMuscular once PRN Glucose LESS THAN 70 milligrams/deciliter  LORazepam     Tablet 1 milliGRAM(s) Oral at bedtime PRN Anxiety  ondansetron   Disintegrating Tablet 4 milliGRAM(s) Oral every 6 hours PRN Nausea  polyethylene glycol 3350 17 Gram(s) Oral every 24 hours PRN Constipation  senna 2 Tablet(s) Oral at bedtime PRN Constipation      Vital Signs Last 24 Hrs  T(C): 36.9 (18 Mar 2019 07:52), Max: 37.1 (17 Mar 2019 15:30)  T(F): 98.4 (18 Mar 2019 07:52), Max: 98.7 (17 Mar 2019 15:30)  HR: 58 (18 Mar 2019 07:52) (58 - 72)  BP: 158/76 (18 Mar 2019 07:52) (127/69 - 178/83)  BP(mean): --  RR: 18 (18 Mar 2019 07:52) (18 - 19)  SpO2: 98% (18 Mar 2019 07:52) (96% - 99%)  CAPILLARY BLOOD GLUCOSE      POCT Blood Glucose.: 108 mg/dL (17 Mar 2019 17:15)    I&O's Summary    17 Mar 2019 07:01  -  18 Mar 2019 07:00  --------------------------------------------------------  IN: 0 mL / OUT: 200 mL / NET: -200 mL        PHYSICAL EXAM:  GENERAL: NAD, well-developed  HEAD:  Atraumatic, Normocephalic  EYES: EOMI, PERRLA, conjunctiva and sclera clear  NECK: Supple, No JVD  CHEST/LUNG: Clear to auscultation bilaterally; No wheeze  HEART: Regular rate and rhythm; No murmurs, rubs, or gallops  ABDOMEN: Soft, Nontender, Nondistended; Bowel sounds present  EXTREMITIES:  2+ Peripheral Pulses, No clubbing, cyanosis, or edema  PSYCH: AAOx3  NEUROLOGY: non-focal  SKIN: No rashes or lesions    LABS:                        14.0   6.6   )-----------( 206      ( 17 Mar 2019 10:59 )             40.6         132<L>  |  97  |  27<H>  ----------------------------<  165<H>  8.7<HH>   |  23  |  0.92    Ca    9.6      17 Mar 2019 10:59  Mg     2.2         TPro  7.3  /  Alb  4.2  /  TBili  0.8  /  DBili  x   /  AST  56<H>  /  ALT  10  /  AlkPhos  65      PT/INR - ( 17 Mar 2019 10:59 )   PT: 13.9 sec;   INR: 1.21 ratio         PTT - ( 17 Mar 2019 10:59 )  PTT:35.9 sec      Urinalysis Basic - ( 17 Mar 2019 10:59 )    Color: Light Yellow / Appearance: Clear / S.013 / pH: x  Gluc: x / Ketone: Negative  / Bili: Negative / Urobili: Negative   Blood: x / Protein: Negative / Nitrite: Negative   Leuk Esterase: Negative / RBC: 1 /hpf / WBC 0 /HPF   Sq Epi: x / Non Sq Epi: 0 /hpf / Bacteria: 0.0        Culture - Urine (collected 15 Mar 2019 22:14)  Source: .Urine Clean Catch (Midstream)  Final Report (16 Mar 2019 16:21):    No growth      RADIOLOGY & ADDITIONAL TESTS:    Imaging Personally Reviewed:    Consultant(s) Notes Reviewed:      Care Discussed with Consultants/Other Providers: Ravinder Briggs  Pager [phone]  Office [phone]    CC: fall  HPI:  78M pmhx DM 2, afib, HOCM s/p AICD 2019, on eliquis p/w b/l acute on chronic SDHs, R >L, after a fall to his right side. Pt was sitting on the edge of his bed, scratching his back and remembers falling to the right side. Doesnt remember any preceding symptoms. Pt/wife deny any recent HAs, trauma. No CP/SOB. No f/c/r. No N/V/abd pain/diarrhea. Has had chronic LE muscle aches that has improved off statin. Occasional light-headedness has improved since ICD. Prior to recent medical complications pt was able to walk up a flight of stairs without any ischemic symptoms.       PAST MEDICAL & SURGICAL HISTORY:  Afib  HOCM  Urinary urgency  Urinary frequency  HLD (hyperlipidemia)  Non-bacterial prostatitis   HTN (hypertension)  Type 2 diabetes mellitus  H/O colonoscopy with polypectomy  History of prostate surgery: s/p prostate microwave therapy  S/P TURP (status post transurethral resection of prostate)  H/O left inguinal hernia repair: with mesh 15 years ago      Review of Systems:   CONSTITUTIONAL: No fever, weight loss, or fatigue  EYES: No eye pain, visual disturbances, or discharge  ENMT:  No difficulty hearing, tinnitus, vertigo; No sinus or throat pain  NECK: No pain or stiffness  BREASTS: No pain, masses, or nipple discharge  RESPIRATORY: No cough, wheezing, chills or hemoptysis; No shortness of breath  CARDIOVASCULAR: No chest pain, palpitations, dizziness, or leg swelling  GASTROINTESTINAL: No abdominal or epigastric pain. No nausea, vomiting, or hematemesis; No diarrhea or constipation. No melena or hematochezia.  GENITOURINARY: No dysuria, frequency, hematuria, or incontinence  NEUROLOGICAL: No headaches, memory loss, loss of strength, numbness, or tremors  SKIN: No itching, burning, rashes, or lesions   LYMPH NODES: No enlarged glands  ENDOCRINE: No heat or cold intolerance; No hair loss  MUSCULOSKELETAL: No joint pain or swelling; No muscle, back, or extremity pain  PSYCHIATRIC: No depression, anxiety, mood swings, or difficulty sleeping  HEME/LYMPH: No easy bruising, or bleeding gums  ALLERY AND IMMUNOLOGIC: No hives or eczema    Allergies    No Known Allergies      Social History:  No smoking, etoh, illegal drugs     FAMILY HISTORY:  Father  of colon cancer- pt has regular screening  Mother  of old age      MEDICATIONS  (STANDING):  amLODIPine   Tablet 10 milliGRAM(s) Oral daily  dextrose 5%. 1000 milliLiter(s) (50 mL/Hr) IV Continuous <Continuous>  dextrose 50% Injectable 12.5 Gram(s) IV Push once  dextrose 50% Injectable 25 Gram(s) IV Push once  dextrose 50% Injectable 25 Gram(s) IV Push once  docusate sodium 100 milliGRAM(s) Oral three times a day  insulin lispro (HumaLOG) corrective regimen sliding scale   SubCutaneous three times a day before meals  insulin lispro (HumaLOG) corrective regimen sliding scale   SubCutaneous at bedtime  levETIRAcetam 500 milliGRAM(s) Oral every 12 hours  lisinopril 40 milliGRAM(s) Oral daily  senna 2 Tablet(s) Oral at bedtime  simvastatin 10 milliGRAM(s) Oral at bedtime  sodium chloride 0.9% lock flush 3 milliLiter(s) IV Push every 8 hours  sotalol 120 milliGRAM(s) Oral two times a day    MEDICATIONS  (PRN):  acetaminophen   Tablet .. 650 milliGRAM(s) Oral every 6 hours PRN Temp greater or equal to 38C (100.4F), Mild Pain (1 - 3)  dextrose 40% Gel 15 Gram(s) Oral once PRN Blood Glucose LESS THAN 70 milliGRAM(s)/deciliter  glucagon  Injectable 1 milliGRAM(s) IntraMuscular once PRN Glucose LESS THAN 70 milligrams/deciliter  LORazepam     Tablet 1 milliGRAM(s) Oral at bedtime PRN Anxiety  ondansetron   Disintegrating Tablet 4 milliGRAM(s) Oral every 6 hours PRN Nausea  polyethylene glycol 3350 17 Gram(s) Oral every 24 hours PRN Constipation  senna 2 Tablet(s) Oral at bedtime PRN Constipation      Vital Signs Last 24 Hrs  T(C): 36.9 (18 Mar 2019 07:52), Max: 37.1 (17 Mar 2019 15:30)  T(F): 98.4 (18 Mar 2019 07:52), Max: 98.7 (17 Mar 2019 15:30)  HR: 58 (18 Mar 2019 07:52) (58 - 72)  BP: 158/76 (18 Mar 2019 07:52) (127/69 - 178/83)  BP(mean): --  RR: 18 (18 Mar 2019 07:52) (18 - 19)  SpO2: 98% (18 Mar 2019 07:52) (96% - 99%)  CAPILLARY BLOOD GLUCOSE      POCT Blood Glucose.: 108 mg/dL (17 Mar 2019 17:15)    I&O's Summary    17 Mar 2019 07:01  -  18 Mar 2019 07:00  --------------------------------------------------------  IN: 0 mL / OUT: 200 mL / NET: -200 mL        PHYSICAL EXAM:  GENERAL: NAD, well-developed  HEAD:  Atraumatic, Normocephalic  EYES: EOMI, PERRLA, conjunctiva and sclera clear  NECK: Supple, No JVD  CHEST/LUNG: Clear to auscultation bilaterally; No wheeze  HEART: Regular rate and rhythm; No murmurs, rubs, or gallops  ABDOMEN: Soft, Nontender, Nondistended; Bowel sounds present  EXTREMITIES: No clubbing, cyanosis, or edema  PSYCH: AAOx3  NEUROLOGY: mild LUE drift  SKIN: No rashes    LABS:                        14.0   6.6   )-----------( 206      ( 17 Mar 2019 10:59 )             40.6     03-17    132<L>  |  97  |  27<H>  ----------------------------<  165<H>  8.7<HH>   |  23  |  0.92    Ca    9.6      17 Mar 2019 10:59  Mg     2.2     03-17    TPro  7.3  /  Alb  4.2  /  TBili  0.8  /  DBili  x   /  AST  56<H>  /  ALT  10  /  AlkPhos  65  03-17    PT/INR - ( 17 Mar 2019 10:59 )   PT: 13.9 sec;   INR: 1.21 ratio         PTT - ( 17 Mar 2019 10:59 )  PTT:35.9 sec      Urinalysis Basic - ( 17 Mar 2019 10:59 )    Color: Light Yellow / Appearance: Clear / S.013 / pH: x  Gluc: x / Ketone: Negative  / Bili: Negative / Urobili: Negative   Blood: x / Protein: Negative / Nitrite: Negative   Leuk Esterase: Negative / RBC: 1 /hpf / WBC 0 /HPF   Sq Epi: x / Non Sq Epi: 0 /hpf / Bacteria: 0.0        Culture - Urine (collected 15 Mar 2019 22:14)  Source: .Urine Clean Catch (Midstream)  Final Report (16 Mar 2019 16:21):    No growth      RADIOLOGY & ADDITIONAL TESTS:    Imaging Personally Reviewed: CT head      Consultant(s) Notes Reviewed:      Care Discussed with Consultants/Other Providers: neurosurg Ravinder Briggs  Pager [phone]  Office [phone]    CC: fall  HPI:  78M pmhx DM 2, afib, HOCM s/p AICD 2019, on eliquis p/w b/l acute on chronic SDHs, R >L, after a fall to his right side. Pt was sitting on the edge of his bed, scratching his back and remembers falling to the right side. Doesnt remember any preceding symptoms. Pt/wife deny any recent HAs, trauma. No CP/SOB. No f/c/r. No N/V/abd pain/diarrhea. Also diagnosed with right distal radius fx s/p cast by ortho. Has had chronic LE muscle aches that has improved off statin. Occasional light-headedness has improved since ICD. Prior to recent medical complications pt was able to walk up a flight of stairs without any ischemic symptoms.       PAST MEDICAL & SURGICAL HISTORY:  Afib  HOCM  Urinary urgency  Urinary frequency  HLD (hyperlipidemia)  Non-bacterial prostatitis   HTN (hypertension)  Type 2 diabetes mellitus  H/O colonoscopy with polypectomy  History of prostate surgery: s/p prostate microwave therapy  S/P TURP (status post transurethral resection of prostate)  H/O left inguinal hernia repair: with mesh 15 years ago      Review of Systems:   CONSTITUTIONAL: No fever, weight loss, or fatigue  EYES: No eye pain, visual disturbances, or discharge  ENMT:  No difficulty hearing, tinnitus, vertigo; No sinus or throat pain  NECK: No pain or stiffness  BREASTS: No pain, masses, or nipple discharge  RESPIRATORY: No cough, wheezing, chills or hemoptysis; No shortness of breath  CARDIOVASCULAR: No chest pain, palpitations, dizziness, or leg swelling  GASTROINTESTINAL: No abdominal or epigastric pain. No nausea, vomiting, or hematemesis; No diarrhea or constipation. No melena or hematochezia.  GENITOURINARY: No dysuria, frequency, hematuria, or incontinence  NEUROLOGICAL: No headaches, memory loss, loss of strength, numbness, or tremors  SKIN: No itching, burning, rashes, or lesions   LYMPH NODES: No enlarged glands  ENDOCRINE: No heat or cold intolerance; No hair loss  MUSCULOSKELETAL: No joint pain or swelling; No muscle, back, or extremity pain  PSYCHIATRIC: No depression, anxiety, mood swings, or difficulty sleeping  HEME/LYMPH: No easy bruising, or bleeding gums  ALLERY AND IMMUNOLOGIC: No hives or eczema    Allergies    No Known Allergies      Social History:  No smoking, etoh, illegal drugs     FAMILY HISTORY:  Father  of colon cancer- pt has regular screening  Mother  of old age      MEDICATIONS  (STANDING):  amLODIPine   Tablet 10 milliGRAM(s) Oral daily  dextrose 5%. 1000 milliLiter(s) (50 mL/Hr) IV Continuous <Continuous>  dextrose 50% Injectable 12.5 Gram(s) IV Push once  dextrose 50% Injectable 25 Gram(s) IV Push once  dextrose 50% Injectable 25 Gram(s) IV Push once  docusate sodium 100 milliGRAM(s) Oral three times a day  insulin lispro (HumaLOG) corrective regimen sliding scale   SubCutaneous three times a day before meals  insulin lispro (HumaLOG) corrective regimen sliding scale   SubCutaneous at bedtime  levETIRAcetam 500 milliGRAM(s) Oral every 12 hours  lisinopril 40 milliGRAM(s) Oral daily  senna 2 Tablet(s) Oral at bedtime  simvastatin 10 milliGRAM(s) Oral at bedtime  sodium chloride 0.9% lock flush 3 milliLiter(s) IV Push every 8 hours  sotalol 120 milliGRAM(s) Oral two times a day    MEDICATIONS  (PRN):  acetaminophen   Tablet .. 650 milliGRAM(s) Oral every 6 hours PRN Temp greater or equal to 38C (100.4F), Mild Pain (1 - 3)  dextrose 40% Gel 15 Gram(s) Oral once PRN Blood Glucose LESS THAN 70 milliGRAM(s)/deciliter  glucagon  Injectable 1 milliGRAM(s) IntraMuscular once PRN Glucose LESS THAN 70 milligrams/deciliter  LORazepam     Tablet 1 milliGRAM(s) Oral at bedtime PRN Anxiety  ondansetron   Disintegrating Tablet 4 milliGRAM(s) Oral every 6 hours PRN Nausea  polyethylene glycol 3350 17 Gram(s) Oral every 24 hours PRN Constipation  senna 2 Tablet(s) Oral at bedtime PRN Constipation      Vital Signs Last 24 Hrs  T(C): 36.9 (18 Mar 2019 07:52), Max: 37.1 (17 Mar 2019 15:30)  T(F): 98.4 (18 Mar 2019 07:52), Max: 98.7 (17 Mar 2019 15:30)  HR: 58 (18 Mar 2019 07:52) (58 - 72)  BP: 158/76 (18 Mar 2019 07:52) (127/69 - 178/83)  BP(mean): --  RR: 18 (18 Mar 2019 07:52) (18 - 19)  SpO2: 98% (18 Mar 2019 07:52) (96% - 99%)  CAPILLARY BLOOD GLUCOSE      POCT Blood Glucose.: 108 mg/dL (17 Mar 2019 17:15)    I&O's Summary    17 Mar 2019 07:01  -  18 Mar 2019 07:00  --------------------------------------------------------  IN: 0 mL / OUT: 200 mL / NET: -200 mL        PHYSICAL EXAM:  GENERAL: NAD, well-developed  HEAD:  Atraumatic, Normocephalic  EYES: EOMI, PERRLA, conjunctiva and sclera clear  NECK: Supple, No JVD  CHEST/LUNG: Clear to auscultation bilaterally; No wheeze  HEART: Regular rate and rhythm; No murmurs, rubs, or gallops  ABDOMEN: Soft, Nontender, Nondistended; Bowel sounds present  EXTREMITIES: No clubbing, cyanosis, or edema  PSYCH: AAOx3  NEUROLOGY: mild LUE drift  SKIN: No rashes    LABS:                        14.0   6.6   )-----------( 206      ( 17 Mar 2019 10:59 )             40.6     03-17    132<L>  |  97  |  27<H>  ----------------------------<  165<H>  8.7<HH>   |  23  |  0.92    Ca    9.6      17 Mar 2019 10:59  Mg     2.2     03-    TPro  7.3  /  Alb  4.2  /  TBili  0.8  /  DBili  x   /  AST  56<H>  /  ALT  10  /  AlkPhos  65  03-17    PT/INR - ( 17 Mar 2019 10:59 )   PT: 13.9 sec;   INR: 1.21 ratio         PTT - ( 17 Mar 2019 10:59 )  PTT:35.9 sec      Urinalysis Basic - ( 17 Mar 2019 10:59 )    Color: Light Yellow / Appearance: Clear / S.013 / pH: x  Gluc: x / Ketone: Negative  / Bili: Negative / Urobili: Negative   Blood: x / Protein: Negative / Nitrite: Negative   Leuk Esterase: Negative / RBC: 1 /hpf / WBC 0 /HPF   Sq Epi: x / Non Sq Epi: 0 /hpf / Bacteria: 0.0        Culture - Urine (collected 15 Mar 2019 22:14)  Source: .Urine Clean Catch (Midstream)  Final Report (16 Mar 2019 16:21):    No growth      RADIOLOGY & ADDITIONAL TESTS:    Imaging Personally Reviewed: CT head      Consultant(s) Notes Reviewed:      Care Discussed with Consultants/Other Providers: neurosurg Ravinder Briggs  Pager [phone]  Office [phone]    CC: fall  HPI:  78M pmhx DM 2, afib, HOCM s/p AICD 2019, on eliquis p/w b/l acute on chronic SDHs, R >L, after a fall to his right side. Pt was sitting on the edge of his bed, scratching his back and remembers falling to the right side. Doesnt remember any preceding symptoms. Pt/wife deny any recent HAs, trauma. No CP/SOB. No f/c/r. No N/V/abd pain/diarrhea. Also diagnosed with right distal radius fx s/p cast by ortho. Has had chronic LE muscle aches that has improved off statin. Occasional light-headedness has improved since ICD. Prior to recent medical complications pt was able to walk up a flight of stairs without any ischemic symptoms.       PAST MEDICAL & SURGICAL HISTORY:  Afib  HOCM  Urinary urgency  Urinary frequency  HLD (hyperlipidemia)  Non-bacterial prostatitis   HTN (hypertension)  Type 2 diabetes mellitus  H/O colonoscopy with polypectomy  History of prostate surgery: s/p prostate microwave therapy  S/P TURP (status post transurethral resection of prostate)  H/O left inguinal hernia repair: with mesh 15 years ago      Review of Systems:   CONSTITUTIONAL: No fever, weight loss, or fatigue  EYES: No eye pain, visual disturbances, or discharge  ENMT:  No difficulty hearing, tinnitus, vertigo; No sinus or throat pain  NECK: No pain or stiffness  BREASTS: No pain, masses, or nipple discharge  RESPIRATORY: No cough, wheezing, chills or hemoptysis; No shortness of breath  CARDIOVASCULAR: No chest pain, palpitations, dizziness, or leg swelling  GASTROINTESTINAL: No abdominal or epigastric pain. No nausea, vomiting, or hematemesis; No diarrhea or constipation. No melena or hematochezia.  GENITOURINARY: No dysuria, frequency, hematuria, or incontinence  NEUROLOGICAL: No headaches, memory loss, loss of strength, numbness, or tremors  SKIN: No itching, burning, rashes, or lesions   LYMPH NODES: No enlarged glands  ENDOCRINE: No heat or cold intolerance; No hair loss  MUSCULOSKELETAL: No joint pain or swelling; No muscle, back, or extremity pain  PSYCHIATRIC: No depression, anxiety, mood swings, or difficulty sleeping  HEME/LYMPH: No easy bruising, or bleeding gums  ALLERY AND IMMUNOLOGIC: No hives or eczema    Allergies    No Known Allergies      Social History:  No smoking, etoh, illegal drugs     FAMILY HISTORY:  Father  of colon cancer- pt has regular screening  Mother  of old age      HOME MEDS:  corrected in med reconciliation       MEDICATIONS  (STANDING):  amLODIPine   Tablet 10 milliGRAM(s) Oral daily  dextrose 5%. 1000 milliLiter(s) (50 mL/Hr) IV Continuous <Continuous>  dextrose 50% Injectable 12.5 Gram(s) IV Push once  dextrose 50% Injectable 25 Gram(s) IV Push once  dextrose 50% Injectable 25 Gram(s) IV Push once  docusate sodium 100 milliGRAM(s) Oral three times a day  insulin lispro (HumaLOG) corrective regimen sliding scale   SubCutaneous three times a day before meals  insulin lispro (HumaLOG) corrective regimen sliding scale   SubCutaneous at bedtime  levETIRAcetam 500 milliGRAM(s) Oral every 12 hours  lisinopril 40 milliGRAM(s) Oral daily  senna 2 Tablet(s) Oral at bedtime  simvastatin 10 milliGRAM(s) Oral at bedtime  sodium chloride 0.9% lock flush 3 milliLiter(s) IV Push every 8 hours  sotalol 120 milliGRAM(s) Oral two times a day    MEDICATIONS  (PRN):  acetaminophen   Tablet .. 650 milliGRAM(s) Oral every 6 hours PRN Temp greater or equal to 38C (100.4F), Mild Pain (1 - 3)  dextrose 40% Gel 15 Gram(s) Oral once PRN Blood Glucose LESS THAN 70 milliGRAM(s)/deciliter  glucagon  Injectable 1 milliGRAM(s) IntraMuscular once PRN Glucose LESS THAN 70 milligrams/deciliter  LORazepam     Tablet 1 milliGRAM(s) Oral at bedtime PRN Anxiety  ondansetron   Disintegrating Tablet 4 milliGRAM(s) Oral every 6 hours PRN Nausea  polyethylene glycol 3350 17 Gram(s) Oral every 24 hours PRN Constipation  senna 2 Tablet(s) Oral at bedtime PRN Constipation      Vital Signs Last 24 Hrs  T(C): 36.9 (18 Mar 2019 07:52), Max: 37.1 (17 Mar 2019 15:30)  T(F): 98.4 (18 Mar 2019 07:52), Max: 98.7 (17 Mar 2019 15:30)  HR: 58 (18 Mar 2019 07:52) (58 - 72)  BP: 158/76 (18 Mar 2019 07:52) (127/69 - 178/83)  BP(mean): --  RR: 18 (18 Mar 2019 07:52) (18 - 19)  SpO2: 98% (18 Mar 2019 07:52) (96% - 99%)  CAPILLARY BLOOD GLUCOSE      POCT Blood Glucose.: 108 mg/dL (17 Mar 2019 17:15)    I&O's Summary    17 Mar 2019 07:01  -  18 Mar 2019 07:00  --------------------------------------------------------  IN: 0 mL / OUT: 200 mL / NET: -200 mL        PHYSICAL EXAM:  GENERAL: NAD, well-developed  HEAD:  Atraumatic, Normocephalic  EYES: EOMI, PERRLA, conjunctiva and sclera clear  NECK: Supple, No JVD  CHEST/LUNG: Clear to auscultation bilaterally; No wheeze  HEART: Regular rate and rhythm; No murmurs, rubs, or gallops  ABDOMEN: Soft, Nontender, Nondistended; Bowel sounds present  EXTREMITIES: No clubbing, cyanosis, or edema  PSYCH: AAOx3  NEUROLOGY: mild LUE drift  SKIN: No rashes    LABS:                        14.0   6.6   )-----------( 206      ( 17 Mar 2019 10:59 )             40.6     03-17    132<L>  |  97  |  27<H>  ----------------------------<  165<H>  8.7<HH>   |  23  |  0.92    Ca    9.6      17 Mar 2019 10:59  Mg     2.2     03-17    TPro  7.3  /  Alb  4.2  /  TBili  0.8  /  DBili  x   /  AST  56<H>  /  ALT  10  /  AlkPhos  65  03-17    PT/INR - ( 17 Mar 2019 10:59 )   PT: 13.9 sec;   INR: 1.21 ratio         PTT - ( 17 Mar 2019 10:59 )  PTT:35.9 sec      Urinalysis Basic - ( 17 Mar 2019 10:59 )    Color: Light Yellow / Appearance: Clear / S.013 / pH: x  Gluc: x / Ketone: Negative  / Bili: Negative / Urobili: Negative   Blood: x / Protein: Negative / Nitrite: Negative   Leuk Esterase: Negative / RBC: 1 /hpf / WBC 0 /HPF   Sq Epi: x / Non Sq Epi: 0 /hpf / Bacteria: 0.0        Culture - Urine (collected 15 Mar 2019 22:14)  Source: .Urine Clean Catch (Midstream)  Final Report (16 Mar 2019 16:21):    No growth      RADIOLOGY & ADDITIONAL TESTS:    Imaging Personally Reviewed: CT head      Consultant(s) Notes Reviewed:      Care Discussed with Consultants/Other Providers: neurosurg Ravinder Briggs  Pager [phone]  Office [phone]    CC: fall  HPI:  78M pmhx DM 2, afib, HOCM s/p AICD 2019, on eliquis p/w b/l acute on chronic SDHs, R >L, after a fall to his right side. Pt was sitting on the edge of his bed, scratching his back and remembers falling to the right side. Doesnt remember any preceding symptoms. Pt/wife deny any recent HAs, trauma. No CP/SOB. No f/c/r. No N/V/abd pain/diarrhea. Also diagnosed with right distal radius fx s/p cast by ortho. Has had chronic LE muscle aches that has improved off statin. Occasional light-headedness has improved since ICD. Prior to recent medical complications pt was able to walk up a flight of stairs without any ischemic symptoms.       PAST MEDICAL & SURGICAL HISTORY:  Afib  HOCM  Urinary urgency  Urinary frequency  HLD (hyperlipidemia)  Non-bacterial prostatitis   HTN (hypertension)  Type 2 diabetes mellitus  H/O colonoscopy with polypectomy  History of prostate surgery: s/p prostate microwave therapy  S/P TURP (status post transurethral resection of prostate)  H/O left inguinal hernia repair: with mesh 15 years ago      Review of Systems:   CONSTITUTIONAL: No fever, weight loss, or fatigue  EYES: No eye pain, visual disturbances, or discharge  ENMT:  No difficulty hearing, tinnitus, vertigo; No sinus or throat pain  NECK: No pain or stiffness  BREASTS: No pain, masses, or nipple discharge  RESPIRATORY: No cough, wheezing, chills or hemoptysis; No shortness of breath  CARDIOVASCULAR: No chest pain, palpitations, dizziness, or leg swelling  GASTROINTESTINAL: No abdominal or epigastric pain. No nausea, vomiting, or hematemesis; No diarrhea or constipation. No melena or hematochezia.  GENITOURINARY: No dysuria, frequency, hematuria, or incontinence  NEUROLOGICAL: No headaches, memory loss, loss of strength, numbness, or tremors  SKIN: No itching, burning, rashes, or lesions   LYMPH NODES: No enlarged glands  ENDOCRINE: No heat or cold intolerance; No hair loss  MUSCULOSKELETAL: No joint pain or swelling; No muscle, back, or extremity pain  PSYCHIATRIC: No depression, anxiety, mood swings, or difficulty sleeping  HEME/LYMPH: No easy bruising, or bleeding gums  ALLERY AND IMMUNOLOGIC: No hives or eczema    Allergies    No Known Allergies      Social History:  No smoking, etoh, illegal drugs     FAMILY HISTORY:  Father  of colon cancer- pt has regular screening  Mother  of old age      HOME MEDS:  corrected in med reconciliation       MEDICATIONS  (STANDING):  amLODIPine   Tablet 10 milliGRAM(s) Oral daily  dextrose 5%. 1000 milliLiter(s) (50 mL/Hr) IV Continuous <Continuous>  dextrose 50% Injectable 12.5 Gram(s) IV Push once  dextrose 50% Injectable 25 Gram(s) IV Push once  dextrose 50% Injectable 25 Gram(s) IV Push once  docusate sodium 100 milliGRAM(s) Oral three times a day  insulin lispro (HumaLOG) corrective regimen sliding scale   SubCutaneous three times a day before meals  insulin lispro (HumaLOG) corrective regimen sliding scale   SubCutaneous at bedtime  levETIRAcetam 500 milliGRAM(s) Oral every 12 hours  lisinopril 40 milliGRAM(s) Oral daily  senna 2 Tablet(s) Oral at bedtime  simvastatin 10 milliGRAM(s) Oral at bedtime  sodium chloride 0.9% lock flush 3 milliLiter(s) IV Push every 8 hours  sotalol 120 milliGRAM(s) Oral two times a day    MEDICATIONS  (PRN):  acetaminophen   Tablet .. 650 milliGRAM(s) Oral every 6 hours PRN Temp greater or equal to 38C (100.4F), Mild Pain (1 - 3)  dextrose 40% Gel 15 Gram(s) Oral once PRN Blood Glucose LESS THAN 70 milliGRAM(s)/deciliter  glucagon  Injectable 1 milliGRAM(s) IntraMuscular once PRN Glucose LESS THAN 70 milligrams/deciliter  LORazepam     Tablet 1 milliGRAM(s) Oral at bedtime PRN Anxiety  ondansetron   Disintegrating Tablet 4 milliGRAM(s) Oral every 6 hours PRN Nausea  polyethylene glycol 3350 17 Gram(s) Oral every 24 hours PRN Constipation  senna 2 Tablet(s) Oral at bedtime PRN Constipation      Vital Signs Last 24 Hrs  T(C): 36.9 (18 Mar 2019 07:52), Max: 37.1 (17 Mar 2019 15:30)  T(F): 98.4 (18 Mar 2019 07:52), Max: 98.7 (17 Mar 2019 15:30)  HR: 58 (18 Mar 2019 07:52) (58 - 72)  BP: 158/76 (18 Mar 2019 07:52) (127/69 - 178/83)  BP(mean): --  RR: 18 (18 Mar 2019 07:52) (18 - 19)  SpO2: 98% (18 Mar 2019 07:52) (96% - 99%)  CAPILLARY BLOOD GLUCOSE      POCT Blood Glucose.: 108 mg/dL (17 Mar 2019 17:15)    I&O's Summary    17 Mar 2019 07:01  -  18 Mar 2019 07:00  --------------------------------------------------------  IN: 0 mL / OUT: 200 mL / NET: -200 mL        PHYSICAL EXAM:  GENERAL: NAD, well-developed  HEAD:  Atraumatic, Normocephalic  EYES: EOMI, PERRLA, conjunctiva and sclera clear  NECK: Supple, No JVD  CHEST/LUNG: Clear to auscultation bilaterally; No wheeze  HEART: Regular rate and rhythm; No murmurs, rubs, or gallops  ABDOMEN: Soft, Nontender, Nondistended; Bowel sounds present  EXTREMITIES: No clubbing, cyanosis, or edema  PSYCH: AAOx3  NEUROLOGY: mild LUE drift  SKIN: No rashes    LABS:                        14.0   6.6   )-----------( 206      ( 17 Mar 2019 10:59 )             40.6     03-17    132<L>  |  97  |  27<H>  ----------------------------<  165<H>  8.7<HH>   |  23  |  0.92    Ca    9.6      17 Mar 2019 10:59  Mg     2.2     03-17    TPro  7.3  /  Alb  4.2  /  TBili  0.8  /  DBili  x   /  AST  56<H>  /  ALT  10  /  AlkPhos  65  03-17    PT/INR - ( 17 Mar 2019 10:59 )   PT: 13.9 sec;   INR: 1.21 ratio         PTT - ( 17 Mar 2019 10:59 )  PTT:35.9 sec      Urinalysis Basic - ( 17 Mar 2019 10:59 )    Color: Light Yellow / Appearance: Clear / S.013 / pH: x  Gluc: x / Ketone: Negative  / Bili: Negative / Urobili: Negative   Blood: x / Protein: Negative / Nitrite: Negative   Leuk Esterase: Negative / RBC: 1 /hpf / WBC 0 /HPF   Sq Epi: x / Non Sq Epi: 0 /hpf / Bacteria: 0.0        Culture - Urine (collected 15 Mar 2019 22:14)  Source: .Urine Clean Catch (Midstream)  Final Report (16 Mar 2019 16:21):    No growth    EKG reviewed by me: Apaced c unchanged flip T waves      RADIOLOGY & ADDITIONAL TESTS:    Imaging Personally Reviewed: CT head      Consultant(s) Notes Reviewed:      Care Discussed with Consultants/Other Providers: neurosurg

## 2019-03-18 NOTE — CONSULT NOTE ADULT - PROVIDER SPECIALTY LIST ADULT
Download the [employer] on your phone and let me know if it records any gasps, snorts or pauses in breathing.    Continue to work on getting 8 hours of sleep on a regular basis. Try to minimize things that cause disruption in your sleep.    Your BMI is Body mass index is 23.17 kg/(m^2).  Weight management is a personal decision.  If you are interested in exploring weight loss strategies, the following discussion covers the approaches that may be successful. Body mass index (BMI) is one way to tell whether you are at a healthy weight, overweight, or obese. It measures your weight in relation to your height.  A BMI of 18.5 to 24.9 is in the healthy range. A person with a BMI of 25 to 29.9 is considered overweight, and someone with a BMI of 30 or greater is considered obese. More than two-thirds of American adults are considered overweight or obese.  Being overweight or obese increases the risk for further weight gain. Excess weight may lead to heart disease and diabetes.  Creating and following plans for healthy eating and physical activity may help you improve your health.  Weight control is part of healthy lifestyle and includes exercise, emotional health, and healthy eating habits. Careful eating habits lifelong are the mainstay of weight control. Though there are significant health benefits from weight loss, long-term weight loss with diet alone may be very difficult to achieve- studies show long-term success with dietary management in less than 10% of people. Attaining a healthy weight may be especially difficult to achieve in those with severe obesity. In some cases, medications, devices and surgical management might be considered.  What can you do?  If you are overweight or obese and are interested in methods for weight loss, you should discuss this with your provider.     Consider reducing daily calorie intake by 500 calories.     Keep a food journal.     Avoiding skipping meals, consider cutting portions  Hospitalist instead.    Diet combined with exercise helps maintain muscle while optimizing fat loss. Strength training is particularly important for building and maintaining muscle mass. Exercise helps reduce stress, increase energy, and improves fitness. Increasing exercise without diet control, however, may not burn enough calories to loose weight.       Start walking three days a week 10-20 minutes at a time    Work towards walking thirty minutes five days a week     Eventually, increase the speed of your walking for 1-2 minutes at time    In addition, we recommend that you review healthy lifestyles and methods for weight loss available through the [employer] of [employer] patient information sites:  http://win.niddk.nih.gov/publications/index.htm    And look into health and wellness programs that may be available through your health insurance provider, employer, local community center, or [employer].

## 2019-03-18 NOTE — PROGRESS NOTE ADULT - ASSESSMENT
78M pmhx afib s/p AICD on eliquis s/p multiple falls since January of this year with worsening gait over 2-3 weeks found to have R>L B/l acute on chronic SDH. On exam LUE pronator drift, shuffling gait, RUE mild resting tremor, otherwise Intact. (17 Mar 2019 13:26)    PLAN:  - PRN pain control   - CT head w/o 3/18: No significant change from CT on 3/17/19  - continue Keppra 500 mg BID for seizure ppx   - Hx of anxiety: continue Lorazepam 1 mg at bedtime PRN  - HTN: continue Amlodipine 10 mg daily, Lisinopril 40 mg daily and Sotalol 120 mg BID  - Cardiology consulted for pre-op evaluation --> His EKG is unchanged, no concern for ischemia, has no murmur on exam. ECHO prior and cMRI without findings of LVOT obstruction. Overall this patient is as _0.9%_ risk or cardiac death, nonfatal myocardial infarction, and nonfatal cardiac arrest perioperatively for this _moderate risk procedure. No further diagnostic or interventional procedures are required prior to the planned procedure.  - Medicine following, recommendations appreciated --> repeat BMP and send CPK   - EP cardiology called --> no need to interrogate ICD, patient was seen and interrogated in February   - R radial fracture s/p splint. Ortho following   - Carb controlled diet  - Bowel regimen   - DVT ppx with Venodynes   - Plan for possible ja hole on Wednesday 3/20   - PT/OT ordered     Will discuss with neurosurgery attending  Ash Kendall PA-C # [id number] 78M pmhx afib s/p AICD on eliquis s/p multiple falls since January of this year with worsening gait over 2-3 weeks found to have R>L B/l acute on chronic SDH. On exam LUE pronator drift, shuffling gait, RUE mild resting tremor, otherwise Intact. (17 Mar 2019 13:26)    PLAN:  - PRN pain control   - CT head w/o 3/18: No significant change from CT on 3/17/19  - continue Keppra 500 mg BID for seizure ppx   - Hx of anxiety: continue Lorazepam 1 mg at bedtime PRN  - HTN: continue Amlodipine 10 mg daily, Lisinopril 40 mg daily and Sotalol 120 mg BID  - Cardiology consulted for pre-op evaluation --> Per cards his EKG is unchanged, no concern for ischemia, has no murmur on exam. ECHO prior and cMRI without findings of LVOT obstruction. Overall this patient is as _0.9%_ risk or cardiac death, nonfatal myocardial infarction, and nonfatal cardiac arrest perioperatively for this _moderate risk procedure. No further diagnostic or interventional procedures are required prior to the planned procedure.  - Medicine following, recommendations appreciated --> repeat BMP and send CPK   - EP cardiology called --> no need to interrogate ICD, patient was seen and interrogated in February   - R radial fracture s/p splint. Ortho following   - Carb controlled diet  - Bowel regimen   - DVT ppx with Venodynes   - Plan for possible ja hole on Wednesday 3/20   - PT/OT ordered     Will discuss with neurosurgery attending  Ash Kendall PA-C # [id number]

## 2019-03-18 NOTE — PROGRESS NOTE ADULT - SUBJECTIVE AND OBJECTIVE BOX
SUBJECTIVE: Patient seen and examined, sitting comfortably eating breakfast, wife at the bedside. No acute events overnight, denies new complaints.       Vital Signs Last 24 Hrs  T(C): 36.9 (18 Mar 2019 07:52), Max: 37.1 (17 Mar 2019 15:30)  T(F): 98.4 (18 Mar 2019 07:52), Max: 98.7 (17 Mar 2019 15:30)  HR: 58 (18 Mar 2019 07:52) (58 - 72)  BP: 158/76 (18 Mar 2019 07:52) (127/69 - 178/83)  BP(mean): --  RR: 18 (18 Mar 2019 07:52) (18 - 19)  SpO2: 98% (18 Mar 2019 07:52) (96% - 98%)    PHYSICAL EXAM:    General: No Acute Distress     Neurological: Awake, alert oriented to person, place and time, Following Commands, PERRL, EOMI, Face Symmetrical, Speech Fluent, Moving all extremities, Muscle Strength normal in all four extremities, mild LUE pronator drift, right radial fracture splinted, Sensation to Light Touch Intact    Pulmonary: Clear to Auscultation, No Rales, No Rhonchi, No Wheezes     Cardiovascular: S1, S2, Regular Rate and Rhythm     Gastrointestinal: Soft, Nontender, Nondistended     Incision:     LABS:                        14.0   6.6   )-----------( 206      ( 17 Mar 2019 10:59 )             40.6    03-17    132<L>  |  97  |  27<H>  ----------------------------<  165<H>  8.7<HH>   |  23  |  0.92    Ca    9.6      17 Mar 2019 10:59  Mg     2.2     03-17    TPro  7.3  /  Alb  4.2  /  TBili  0.8  /  DBili  x   /  AST  56<H>  /  ALT  10  /  AlkPhos  65  03-17  PT/INR - ( 17 Mar 2019 10:59 )   PT: 13.9 sec;   INR: 1.21 ratio         PTT - ( 17 Mar 2019 10:59 )  PTT:35.9 sec      03-17 @ 07:01  -  03-18 @ 07:00  --------------------------------------------------------  IN: 0 mL / OUT: 200 mL / NET: -200 mL    03-18 @ 07:01  - 03-18 @ 12:31  --------------------------------------------------------  IN: 120 mL / OUT: 0 mL / NET: 120 mL      DRAINS: none    MEDICATIONS:  Antibiotics:    Neuro:  acetaminophen   Tablet .. 650 milliGRAM(s) Oral every 6 hours PRN Temp greater or equal to 38C (100.4F), Mild Pain (1 - 3)  levETIRAcetam 500 milliGRAM(s) Oral every 12 hours  LORazepam     Tablet 1 milliGRAM(s) Oral at bedtime PRN Anxiety  ondansetron   Disintegrating Tablet 4 milliGRAM(s) Oral every 6 hours PRN Nausea    Cardiac:  amLODIPine   Tablet 10 milliGRAM(s) Oral daily  lisinopril 40 milliGRAM(s) Oral daily  sotalol 120 milliGRAM(s) Oral two times a day    Pulm:    GI/:  docusate sodium 100 milliGRAM(s) Oral three times a day  polyethylene glycol 3350 17 Gram(s) Oral every 24 hours PRN Constipation  senna 2 Tablet(s) Oral at bedtime PRN Constipation  senna 2 Tablet(s) Oral at bedtime    Other:   dextrose 40% Gel 15 Gram(s) Oral once PRN Blood Glucose LESS THAN 70 milliGRAM(s)/deciliter  dextrose 5%. 1000 milliLiter(s) IV Continuous <Continuous>  dextrose 50% Injectable 12.5 Gram(s) IV Push once  dextrose 50% Injectable 25 Gram(s) IV Push once  dextrose 50% Injectable 25 Gram(s) IV Push once  glucagon  Injectable 1 milliGRAM(s) IntraMuscular once PRN Glucose LESS THAN 70 milligrams/deciliter  insulin lispro (HumaLOG) corrective regimen sliding scale   SubCutaneous three times a day before meals  insulin lispro (HumaLOG) corrective regimen sliding scale   SubCutaneous at bedtime  sodium chloride 0.9% lock flush 3 milliLiter(s) IV Push every 8 hours    DIET: [] Regular [] CCD [] Renal [] Puree [] Dysphagia [] Tube Feeds:     IMAGING:

## 2019-03-18 NOTE — CONSULT NOTE ADULT - SUBJECTIVE AND OBJECTIVE BOX
Initial Cardiology Attending Consult    CHIEF COMPLAINT: fall    HISTORY OF PRESENT ILLNESS:  BETH MAYFIELD is a 78y Male patient PMH apical variant HCM, p afib s/p DCCV 1/17/19 on Eliquis HTN, HLD, DM , BPH  presenting with a mechanical fall with rt wrist fx.  He states he felt an itch , went to scratch it and fell out of bed.  Head CT demonstrates an acute on chronic SDH.  He is noted to have had 2-3 weeks of worsening gait.   Cardiology is now consulted for pre-op evaluation for upcoming neurosurgery.    Allergies    No Known Allergies    Intolerances    	    PAST MEDICAL & SURGICAL HISTORY:  Urinary urgency  Urinary frequency  HLD (hyperlipidemia)  Overactive bladder  HTN (hypertension)  Type 2 diabetes mellitus  H/O colonoscopy with polypectomy  History of prostate surgery: s/p prostate microwave therapy  S/P TURP (status post transurethral resection of prostate)  H/O left inguinal hernia repair: with mesh 15 years ago      FAMILY HISTORY:  Family history of colon cancer      SOCIAL HISTORY:    [ ] Non-smoker  [ ] Smoker  [ ] Alcohol      REVIEW OF SYSTEMS:  CONSTITUTIONAL: No fever, weight loss, or fatigue  EYES: No eye pain, visual disturbances, or discharge  ENMT:  No difficulty hearing, tinnitus, vertigo; No sinus or throat pain  NECK: No pain or stiffness  RESPIRATORY: No cough, wheezing, chills or hemoptysis; No Shortness of Breath  CARDIOVASCULAR: No chest pain, palpitations, passing out, dizziness, or leg swelling  GASTROINTESTINAL: No abdominal or epigastric pain. No nausea, vomiting, or hematemesis; No diarrhea or constipation. No melena or hematochezia.  GENITOURINARY: No dysuria, frequency, hematuria, or incontinence  NEUROLOGICAL: No headaches, memory loss, loss of strength, numbness, or tremors  SKIN: No itching, burning, rashes, or lesions   LYMPH Nodes: No enlarged glands  ENDOCRINE: No heat or cold intolerance; No hair loss  MUSCULOSKELETAL: No joint pain or swelling; No muscle, back, or extremity pain  PSYCHIATRIC: No depression, anxiety, mood swings, or difficulty sleeping  HEME/LYMPH: No easy bruising, or bleeding gums  ALLERY AND IMMUNOLOGIC: No hives or eczema	    [ ] All others negative	  [ ] Unable to obtain    PHYSICAL EXAM:  I&O's Summary  Vital Signs Last 24 Hrs  T(C): 36.7 (18 Mar 2019 05:20), Max: 37.1 (17 Mar 2019 15:30)  T(F): 98.1 (18 Mar 2019 05:20), Max: 98.7 (17 Mar 2019 15:30)  HR: 63 (18 Mar 2019 05:20) (60 - 72)  BP: 154/70 (18 Mar 2019 05:20) (127/69 - 178/83)  BP(mean): --  RR: 18 (18 Mar 2019 05:20) (18 - 19)  SpO2: 98% (18 Mar 2019 05:20) (96% - 99%)    Appearance: Normal	  HEENT:   Normal oral mucosa, PERRL, EOMI	  Lymphatic: No lymphadenopathy  Cardiovascular: Normal S1 S2, No JVD, No murmurs, No edema  Respiratory: Lungs clear to auscultation	  Psychiatry: A & O x 3, Mood & affect appropriate  Gastrointestinal:  Soft, Non-tender, + BS	  Skin: No rashes, No ecchymoses, No cyanosis	  Neurologic: + tremor,   Extremities: Normal range of motion, No clubbing, cyanosis or edema, Rt Wrist in cast  Vascular: Peripheral pulses palpable 2+ bilaterally    MEDICATIONS:  Home Medications:  Actos 15 mg oral tablet: 1 tab(s) orally once a day (17 Jan 2019 00:48)  amLODIPine 10 mg oral tablet: 1 tab(s) orally once a day (17 Jan 2019 00:48)  CeleBREX 100 mg oral capsule: 1 cap(s) orally 2 times a day (17 Jan 2019 00:48)  glyburide-metformin 5 mg-500 mg oral tablet: 1 tab(s) orally 2 times a day (17 Jan 2019 00:48)  LORazepam 1 mg oral tablet: 1 tab(s) orally once a day (at bedtime) (17 Jan 2019 00:48)  quinapril 40 mg oral tablet: 1 tab(s) orally once a day (17 Jan 2019 00:48)    MEDICATIONS  (STANDING):  amLODIPine   Tablet 10 milliGRAM(s) Oral daily  dextrose 5%. 1000 milliLiter(s) (50 mL/Hr) IV Continuous <Continuous>  dextrose 50% Injectable 12.5 Gram(s) IV Push once  dextrose 50% Injectable 25 Gram(s) IV Push once  dextrose 50% Injectable 25 Gram(s) IV Push once  docusate sodium 100 milliGRAM(s) Oral three times a day  insulin lispro (HumaLOG) corrective regimen sliding scale   SubCutaneous three times a day before meals  insulin lispro (HumaLOG) corrective regimen sliding scale   SubCutaneous at bedtime  levETIRAcetam 500 milliGRAM(s) Oral every 12 hours  lisinopril 40 milliGRAM(s) Oral daily  simvastatin 10 milliGRAM(s) Oral at bedtime  sodium chloride 0.9% lock flush 3 milliLiter(s) IV Push every 8 hours  sotalol 120 milliGRAM(s) Oral two times a day    MEDICATIONS  (PRN):  acetaminophen   Tablet .. 650 milliGRAM(s) Oral every 6 hours PRN Temp greater or equal to 38C (100.4F), Mild Pain (1 - 3)  dextrose 40% Gel 15 Gram(s) Oral once PRN Blood Glucose LESS THAN 70 milliGRAM(s)/deciliter  glucagon  Injectable 1 milliGRAM(s) IntraMuscular once PRN Glucose LESS THAN 70 milligrams/deciliter  LORazepam     Tablet 1 milliGRAM(s) Oral at bedtime PRN Anxiety  ondansetron   Disintegrating Tablet 4 milliGRAM(s) Oral every 6 hours PRN Nausea  senna 2 Tablet(s) Oral at bedtime PRN Constipation      LABS:	 	    CBC Full  -  ( 17 Mar 2019 10:59 )  WBC Count : 6.6 K/uL  Hemoglobin : 14.0 g/dL  Hematocrit : 40.6 %  Platelet Count - Automated : 206 K/uL  Mean Cell Volume : 94.1 fl  Mean Cell Hemoglobin : 32.5 pg  Mean Cell Hemoglobin Concentration : 34.6 gm/dL  Auto Neutrophil # : 5.0 K/uL  Auto Lymphocyte # : 0.8 K/uL  Auto Monocyte # : 0.7 K/uL  Auto Eosinophil # : 0.1 K/uL  Auto Basophil # : 0.0 K/uL  Auto Neutrophil % : 74.5 %  Auto Lymphocyte % : 12.8 %  Auto Monocyte % : 10.5 %  Auto Eosinophil % : 2.0 %  Auto Basophil % : 0.2 %    03-17    132<L>  |  97  |  27<H>  ----------------------------<  165<H>  8.7<HH>   |  23  |  0.92    Ca    9.6      17 Mar 2019 10:59  Mg     2.2     03-17    TPro  7.3  /  Alb  4.2  /  TBili  0.8  /  DBili  x   /  AST  56<H>  /  ALT  10  /  AlkPhos  65  03-17          TELEMETRY: 	no tele    ECG:  	SR with int Apaced 64bpm, LVH with lat TWI, unchanged from prio  RADIOLOGY:  CMRI  IMPRESSION:  Diffuse thickening of the left ventricle, most predominant in the apical   segments consistent with apical variant hypertrophic cardiomyopathy. No   evidence of outflow tract obstruction. No regional wall motion   abnormality is visualized. On delayed enhancement sequence, there is   enhancement along the apical anterior and apical lateral segments   consistent with scarring.     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 No midline shift.    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    Dr. Rosa discussed these findings with Dr. Ro with read back on   3/17/2018 at 10:41 AM.      OTHER: 	    CARDIAC TESTING/STUDIES:    [ ] Echocardiogram: ------------------------------------------------------------------------  Conclusions:   1. No left atrial or left atrial appendage thrombus.    Decreased left atrial appendage velocities noted.  2. Apical variant hypertrophy with chamber olbliteration at  the mid and distal ventricle.  3. Normal left ventricular systolic function.  4. Right atrial enlargement.  5. Normal right ventricular size and function.  6. Estimated pulmonary artery systolic pressure equals 27  mm Hg, assuming right atrial pressure equals 8  mm Hg,  consistent with normal pulmonary pressures.  7. Agitated saline injection and color flow Doppler  demonstrates no evidence of a patent foramen ovale.  ------------------------------------------------------------------------  Confirmed on  1/17/2019 - 18:09:13 by Sebastian Bourne M.D.  [ ]  Catheterization:  [ ] Stress Test:  	  	  ASSESSMENT/PLAN: 	  BETH MAYFIELD is a 78y Male patient PMH apical variant HCM, p afib s/p DCCV 1/17/19 on Eliquis HTN, HLD, DM , BPH  presenting with a mechanical fall with rt wrist fx.  He states he felt an itch , went to scratch it and fell out of bed.  Head CT demonstrates an acute on chronic SDH.  He is noted to have had 2-3 weeks of worsening gait.   Cardiology is now consulted for pre-op evaluation for upcoming neurosurgery.    His EKG is unchanged, no concern for ischemia. he has no murmur on exam.  ECHO prior and cMRI without findings of LVOT obstruction    1. Cardiovascular Risk Stratification - RCRI =  ( 1).  1 predictor = 0.9%,   - Overall this patient is as _0.9%_ risk or cardiac death, nonfatal myocardial infarction, and nonfatal cardiac arrest perioperatively for this _moderate risk procedure.  -No further diagnostic or interventional procedures are required prior to the planned procedure.    -avoid dehydration  -monitor on tele in the perio-op period  hold AC due to SDH, may consider watchman as outpatient later  continue sotalol   continue lisinopril   continue norvasc    -avoid diuretics, avoid vasodilators avoid beta agonists    He will need outpatient EP and cardiology fu post discharge      Arian Ness MD, PhD  Cardiology Attending  [facility]  [phone]    (Cardiology [facility] cell number available 7 pm - 7 am every night; available daytime week days for follow-up only; daytime weekends covered by general cardiology consult service)

## 2019-03-19 ENCOUNTER — TRANSCRIPTION ENCOUNTER (OUTPATIENT)
Age: 79
End: 2019-03-19

## 2019-03-19 LAB
ANION GAP SERPL CALC-SCNC: 16 MMOL/L — SIGNIFICANT CHANGE UP (ref 5–17)
APTT BLD: 32.1 SEC — SIGNIFICANT CHANGE UP (ref 27.5–36.3)
BLD GP AB SCN SERPL QL: NEGATIVE — SIGNIFICANT CHANGE UP
BUN SERPL-MCNC: 21 MG/DL — SIGNIFICANT CHANGE UP (ref 7–23)
CALCIUM SERPL-MCNC: 10 MG/DL — SIGNIFICANT CHANGE UP (ref 8.4–10.5)
CHLORIDE SERPL-SCNC: 102 MMOL/L — SIGNIFICANT CHANGE UP (ref 96–108)
CO2 SERPL-SCNC: 21 MMOL/L — LOW (ref 22–31)
CREAT SERPL-MCNC: 0.87 MG/DL — SIGNIFICANT CHANGE UP (ref 0.5–1.3)
GLUCOSE SERPL-MCNC: 176 MG/DL — HIGH (ref 70–99)
HCT VFR BLD CALC: 42.3 % — SIGNIFICANT CHANGE UP (ref 39–50)
HGB BLD-MCNC: 13.8 G/DL — SIGNIFICANT CHANGE UP (ref 13–17)
INR BLD: 1.1 RATIO — SIGNIFICANT CHANGE UP (ref 0.88–1.16)
MCHC RBC-ENTMCNC: 30.5 PG — SIGNIFICANT CHANGE UP (ref 27–34)
MCHC RBC-ENTMCNC: 32.6 GM/DL — SIGNIFICANT CHANGE UP (ref 32–36)
MCV RBC AUTO: 93.6 FL — SIGNIFICANT CHANGE UP (ref 80–100)
PLATELET # BLD AUTO: 200 K/UL — SIGNIFICANT CHANGE UP (ref 150–400)
POTASSIUM SERPL-MCNC: 4.5 MMOL/L — SIGNIFICANT CHANGE UP (ref 3.5–5.3)
POTASSIUM SERPL-SCNC: 4.5 MMOL/L — SIGNIFICANT CHANGE UP (ref 3.5–5.3)
PROTHROM AB SERPL-ACNC: 12.6 SEC — SIGNIFICANT CHANGE UP (ref 10–12.9)
RBC # BLD: 4.52 M/UL — SIGNIFICANT CHANGE UP (ref 4.2–5.8)
RBC # FLD: 12.4 % — SIGNIFICANT CHANGE UP (ref 10.3–14.5)
RH IG SCN BLD-IMP: POSITIVE — SIGNIFICANT CHANGE UP
SODIUM SERPL-SCNC: 139 MMOL/L — SIGNIFICANT CHANGE UP (ref 135–145)
WBC # BLD: 8.05 K/UL — SIGNIFICANT CHANGE UP (ref 3.8–10.5)
WBC # FLD AUTO: 8.05 K/UL — SIGNIFICANT CHANGE UP (ref 3.8–10.5)

## 2019-03-19 PROCEDURE — 99232 SBSQ HOSP IP/OBS MODERATE 35: CPT

## 2019-03-19 RX ORDER — HYDRALAZINE HCL 50 MG
10 TABLET ORAL ONCE
Qty: 0 | Refills: 0 | Status: COMPLETED | OUTPATIENT
Start: 2019-03-19 | End: 2019-03-19

## 2019-03-19 RX ORDER — SODIUM CHLORIDE 9 MG/ML
1000 INJECTION INTRAMUSCULAR; INTRAVENOUS; SUBCUTANEOUS
Qty: 0 | Refills: 0 | Status: DISCONTINUED | OUTPATIENT
Start: 2019-03-20 | End: 2019-03-20

## 2019-03-19 RX ADMIN — Medication 100 MILLIGRAM(S): at 13:38

## 2019-03-19 RX ADMIN — Medication 100 MILLIGRAM(S): at 06:17

## 2019-03-19 RX ADMIN — Medication 120 MILLIGRAM(S): at 17:24

## 2019-03-19 RX ADMIN — LEVETIRACETAM 500 MILLIGRAM(S): 250 TABLET, FILM COATED ORAL at 17:24

## 2019-03-19 RX ADMIN — LISINOPRIL 40 MILLIGRAM(S): 2.5 TABLET ORAL at 06:17

## 2019-03-19 RX ADMIN — SODIUM CHLORIDE 3 MILLILITER(S): 9 INJECTION INTRAMUSCULAR; INTRAVENOUS; SUBCUTANEOUS at 23:06

## 2019-03-19 RX ADMIN — SODIUM CHLORIDE 3 MILLILITER(S): 9 INJECTION INTRAMUSCULAR; INTRAVENOUS; SUBCUTANEOUS at 13:35

## 2019-03-19 RX ADMIN — SENNA PLUS 2 TABLET(S): 8.6 TABLET ORAL at 22:10

## 2019-03-19 RX ADMIN — LEVETIRACETAM 500 MILLIGRAM(S): 250 TABLET, FILM COATED ORAL at 06:17

## 2019-03-19 RX ADMIN — Medication 10 MILLIGRAM(S): at 05:51

## 2019-03-19 RX ADMIN — AMLODIPINE BESYLATE 10 MILLIGRAM(S): 2.5 TABLET ORAL at 06:17

## 2019-03-19 RX ADMIN — Medication 1 MILLIGRAM(S): at 22:10

## 2019-03-19 RX ADMIN — Medication 1: at 08:39

## 2019-03-19 RX ADMIN — SODIUM CHLORIDE 3 MILLILITER(S): 9 INJECTION INTRAMUSCULAR; INTRAVENOUS; SUBCUTANEOUS at 06:20

## 2019-03-19 RX ADMIN — Medication 100 MILLIGRAM(S): at 22:10

## 2019-03-19 RX ADMIN — Medication 120 MILLIGRAM(S): at 06:17

## 2019-03-19 NOTE — PHYSICAL THERAPY INITIAL EVALUATION ADULT - DIAGNOSIS, PT EVAL
Decreased functional mobility d/t impaired motor control, coordination, balance, strength and endurance.

## 2019-03-19 NOTE — PROGRESS NOTE ADULT - ASSESSMENT
78M pmhx DM 2, afib, HOCM s/p AICD 1/2019, on eliquis p/w acute on chronic b/l SDHs after a fall to his right side.

## 2019-03-19 NOTE — DISCHARGE NOTE NURSING/CASE MANAGEMENT/SOCIAL WORK - NSDCDPATPORTLINK_GEN_ALL_CORE
You can access the [facility], offered by [facility], by registering with the following website: [email or web address]

## 2019-03-19 NOTE — PROGRESS NOTE ADULT - ASSESSMENT
HPI:   78M pmhx afib s/p AICD on eliquis s/p multiple falls since January of this year with worsening gait over 2-3 weeks found to have R>L B/l acute on chronic SDH. On exam LUE pronator drift, shuffling gait, RUE mild resting tremor, otherwise Intact. (17 Mar 2019 13:26)    PLAN:  NEURO: B/L SDH, preop for OR in AM cleared by cardiology, continue Keppra for seizure ppx, tylenol prn for pain  PULM: room air, incentive spirometry  CV: HOCM, HTN, HLD, Afib, s/p AICD, cont amlodipine, lisinopril, and sotalol per cards; hold eliquis;   - Cardiology consulted for pre-op evaluation --> Per cards his EKG is unchanged, no concern for ischemia, has no murmur on exam. ECHO prior and cMRI without findings of LVOT obstruction. Overall this patient is as _0.9%_ risk or cardiac death, nonfatal myocardial infarction, and nonfatal cardiac arrest perioperatively for this _moderate risk procedure. No further diagnostic or interventional procedures are required prior to the planned procedure.  ENDO: HgA1C 7.5, continue sliding scale  HEME/ONC:     h/h stable          DVT ppx: [] SQL [] SQH [X] Venodynes bilaterally   RENAL: IVL, will start IVF after MN  ID: afebrile  GI: DASH diet for now, npo after MN, bowel regimen  DISCHARGE PLANNING: Will have PT evaluate after surgery      Assessment:  Please Check When Present   []  GCS  E   V  M     Heart Failure: []Acute, [] acute on chronic , []chronic  Heart Failure:  [] Diastolic (HFpEF), [] Systolic (HFrEF), []Combined (HFpEF and HFrEF), [] RHF, [] Pulm HTN, [] Other    [] OSCAR, [] ATN, [] AIN, [] other  [] CKD1, [] CKD2, [] CKD 3, [] CKD 4, [] CKD 5, []ESRD    Encephalopathy: [] Metabolic, [] Hepatic, [] toxic, [] Neurological, [] Other    Abnormal Nurtitional Status: [] malnurtition (see nutrition note), [ ]underweight: BMI < 19, [] morbid obesity: BMI >40, [] Cachexia    [] Sepsis  [] hypovolemic shock,[] cardiogenic shock, [] hemorrhagic shock, [] neuogenic shock  [] Acute Respiratory Failure  []Cerebral edema, [] Brain compression/ herniation,   [] Functional quadriplegia  [] Acute blood loss anemia    Will discuss plan with Dr. Xavier  Spectra # [id number]

## 2019-03-19 NOTE — PROGRESS NOTE ADULT - SUBJECTIVE AND OBJECTIVE BOX
SUBJECTIVE: Patient seen and examined at bedside.  Denies and headaches.  C/O urinary frequency, no burning or discomfort.  Wife at bedside    CHIEF COMPLAINT: headaches, multiple falls    OVERNIGHT EVENTS: none    Vital Signs Last 24 Hrs  T(C): 36.9 (19 Mar 2019 08:38), Max: 37 (18 Mar 2019 21:21)  T(F): 98.4 (19 Mar 2019 08:38), Max: 98.6 (18 Mar 2019 21:21)  HR: 61 (19 Mar 2019 08:38) (60 - 65)  BP: 121/65 (19 Mar 2019 08:38) (121/65 - 167/78)  BP(mean): --  RR: 18 (19 Mar 2019 08:38) (18 - 19)  SpO2: 96% (19 Mar 2019 08:38) (96% - 99%)    PHYSICAL EXAM:    General: No Acute Distress     Neurological: Awake, alert, oriented to self, place and month/year; speech clear; face symmetrical, follows commands, CHRISTOPHER, +cast Rt arm (rt radius fx), RUE 4+/5 prox, RUE distally 3/5, LUE 3/5, + Lt drift, B/L LE 4/5    Pulmonary: Clear to Auscultation, No Rales, No Rhonchi, No Wheezes     Cardiovascular: S1, S2, Regular Rate and Rhythm     Gastrointestinal: Soft, Nontender, Nondistended       LABS:                        14.0   6.6   )-----------( 206      ( 17 Mar 2019 10:59 )             40.6    03-19    139  |  102  |  21  ----------------------------<  176<H>  4.5   |  21<L>  |  0.87    Ca    10.0      19 Mar 2019 07:03  Mg     2.2     03-17    TPro  7.3  /  Alb  4.2  /  TBili  0.8  /  DBili  x   /  AST  56<H>  /  ALT  10  /  AlkPhos  65  03-17  PT/INR - ( 17 Mar 2019 10:59 )   PT: 13.9 sec;   INR: 1.21 ratio         PTT - ( 17 Mar 2019 10:59 )  PTT:35.9 sec  Hemoglobin A1C, Whole Blood: 7.5 % (03-18 @ 17:27)    CAPILLARY BLOOD GLUCOSE      POCT Blood Glucose.: 155 mg/dL (19 Mar 2019 08:02)  POCT Blood Glucose.: 143 mg/dL (18 Mar 2019 21:12)  POCT Blood Glucose.: 152 mg/dL (18 Mar 2019 17:29)  POCT Blood Glucose.: 178 mg/dL (18 Mar 2019 13:01)        03-18 @ 07:01  -  03-19 @ 07:00  --------------------------------------------------------  IN: 460 mL / OUT: 0 mL / NET: 460 mL      MEDICATIONS:  Antibiotics:    Neuro:  acetaminophen   Tablet .. 650 milliGRAM(s) Oral every 6 hours PRN Temp greater or equal to 38C (100.4F), Mild Pain (1 - 3)  diphenhydrAMINE 25 milliGRAM(s) Oral every 4 hours PRN Rash and/or Itching  levETIRAcetam 500 milliGRAM(s) Oral every 12 hours  LORazepam     Tablet 1 milliGRAM(s) Oral at bedtime PRN Anxiety  ondansetron   Disintegrating Tablet 4 milliGRAM(s) Oral every 6 hours PRN Nausea    Cardiac:  amLODIPine   Tablet 10 milliGRAM(s) Oral daily  lisinopril 40 milliGRAM(s) Oral daily  sotalol 120 milliGRAM(s) Oral two times a day    Pulm:    GI/:  bisacodyl 5 milliGRAM(s) Oral every 12 hours PRN Constipation  docusate sodium 100 milliGRAM(s) Oral three times a day  polyethylene glycol 3350 17 Gram(s) Oral every 24 hours PRN Constipation  senna 2 Tablet(s) Oral at bedtime PRN Constipation  senna 2 Tablet(s) Oral at bedtime    Other:   dextrose 40% Gel 15 Gram(s) Oral once PRN Blood Glucose LESS THAN 70 milliGRAM(s)/deciliter  dextrose 5%. 1000 milliLiter(s) IV Continuous <Continuous>  dextrose 50% Injectable 12.5 Gram(s) IV Push once  dextrose 50% Injectable 25 Gram(s) IV Push once  dextrose 50% Injectable 25 Gram(s) IV Push once  glucagon  Injectable 1 milliGRAM(s) IntraMuscular once PRN Glucose LESS THAN 70 milligrams/deciliter  insulin lispro (HumaLOG) corrective regimen sliding scale   SubCutaneous three times a day before meals  insulin lispro (HumaLOG) corrective regimen sliding scale   SubCutaneous at bedtime  sodium chloride 0.9% lock flush 3 milliLiter(s) IV Push every 8 hours    DIET: [X] Regular [] CCD [] Renal [] Puree [] Dysphagia [] Tube Feeds:     IMAGING:   < from: VA Duplex Lower Ext Vein Scan, Bilat (03.18.19 @ 16:30) >  No evidence of bilateral lower extremity deep venous thrombosis.    < end of copied text >  < from: CT Head No Cont (03.18.19 @ 10:00) >  No significant change from 3/17/2019 in the 2.4 cm right and 1.4 cm left   holohemispheric mixed density hemorrhagic subdural collections with   effacement of the cerebral convexities, and 3 mm leftward shift, volume   loss and microvascular disease.    < end of copied text >  < from: Xray Forearm, Right (03.17.19 @ 10:26) >   Nondisplaced fracture of the lateral aspect of the distal   right radius is better appreciated on the dedicated radiographs of the   right wrist. No fracture is seen in the proximal and mid right radius and   ulna.    < end of copied text >

## 2019-03-19 NOTE — PHYSICAL THERAPY INITIAL EVALUATION ADULT - IMPAIRMENTS CONTRIBUTING IMPAIRED BED MOBILITY, REHAB EVAL
impaired balance/impaired coordination/impaired motor control/impaired postural control/decreased strength

## 2019-03-19 NOTE — DISCHARGE NOTE NURSING/CASE MANAGEMENT/SOCIAL WORK - NSDCPEPTSTRK_GEN_ALL_CORE
Prescribed medications/Risk factors for stroke/Need for follow up after discharge/Stroke warning signs and symptoms/Signs and symptoms of stroke/Stroke education booklet/Stroke support groups for patients, families, and friends/Call 911 for stroke

## 2019-03-19 NOTE — PHYSICAL THERAPY INITIAL EVALUATION ADULT - GAIT DEVIATIONS NOTED, PT EVAL
decreased step length/decreased stride length/increased stride width/decreased weight-shifting ability/decreased sloan

## 2019-03-19 NOTE — PHYSICAL THERAPY INITIAL EVALUATION ADULT - PERTINENT HX OF CURRENT PROBLEM, REHAB EVAL
Pt is a 78M pmhx afib s/p AICD on eliquis s/p multiple falls since January of this year with worsening gait over 2-3 weeks found to have R>L B/l acute on chronic SDH. On exam LUE pronator drift, shuffling gait, RUE mild resting tremor, otherwise Intact. Pt with R wrist s/p most recent fall, +xray R radial fx, RUE in splint and NWB as per ortho. Pt pending neurosx 3/19. Pt is a 78M pmhx afib s/p AICD on eliquis s/p multiple falls since January of this year with worsening gait over 2-3 weeks found to have R>L B/l acute on chronic SDH. On exam LUE pronator drift, shuffling gait, RUE mild resting tremor, otherwise Intact. Pt with R wrist s/p most recent fall, +xray R radial fx, RUE in splint and NWB as per ortho. Pt now s/p sx as noted above.

## 2019-03-19 NOTE — PROGRESS NOTE ADULT - NSICDXPROBLEM_GEN_ALL_CORE_FT
PROBLEM DIAGNOSES  Problem: Fall  Assessment and Plan: FU orthostatics. Fall caused SDH    Problem: HOCM (hypertrophic obstructive cardiomyopathy)  Assessment and Plan: Cardiology eval noted    Problem: Atrial fibrillation  Assessment and Plan: Unable to anticoagulate due to SDH/ Falls    Problem: HTN (hypertension)  Assessment and Plan: FU on current meds    Problem: Diabetes type 2, controlled  Assessment and Plan: RISS    Problem: SDH (subdural hematoma)  Assessment and Plan: Cards note appreciated. Pt is medically optimized for planned procedure if hyponatremia is stable and hyperkalemia was a lab error. Pt/wife were explained interactions of apixaban/naproxen/celebrex/herbs.

## 2019-03-19 NOTE — PROGRESS NOTE ADULT - SUBJECTIVE AND OBJECTIVE BOX
Patient is a 78y old  Male who presents with a chief complaint of b/l SDH (19 Mar 2019 10:31)      SUBJECTIVE / OVERNIGHT EVENTS:  No dizziness, but feels dizzy  Review of Systems:   CONSTITUTIONAL: No fever, weight loss, or fatigue  EYES: No eye pain, visual disturbances, or discharge  ENMT:  No difficulty hearing, tinnitus, vertigo; No sinus or throat pain  NECK: No pain or stiffness  BREASTS: No pain, masses, or nipple discharge  RESPIRATORY: No cough, wheezing, chills or hemoptysis; No shortness of breath  CARDIOVASCULAR: No chest pain, palpitations, dizziness, or leg swelling  GASTROINTESTINAL: No abdominal or epigastric pain. No nausea, vomiting, or hematemesis; No diarrhea or constipation. No melena or hematochezia.  GENITOURINARY: No dysuria, frequency, hematuria, or incontinence  NEUROLOGICAL: No headaches, memory loss, loss of strength, numbness, or tremors  SKIN: No itching, burning, rashes, or lesions   LYMPH NODES: No enlarged glands  ENDOCRINE: No heat or cold intolerance; No hair loss  MUSCULOSKELETAL: No joint pain or swelling; No muscle, back, or extremity pain  PSYCHIATRIC: No depression, anxiety, mood swings, or difficulty sleeping  HEME/LYMPH: No easy bruising, or bleeding gums  ALLERY AND IMMUNOLOGIC: No hives or eczema    MEDICATIONS  (STANDING):  amLODIPine   Tablet 10 milliGRAM(s) Oral daily  dextrose 5%. 1000 milliLiter(s) (50 mL/Hr) IV Continuous <Continuous>  dextrose 50% Injectable 12.5 Gram(s) IV Push once  dextrose 50% Injectable 25 Gram(s) IV Push once  dextrose 50% Injectable 25 Gram(s) IV Push once  docusate sodium 100 milliGRAM(s) Oral three times a day  insulin lispro (HumaLOG) corrective regimen sliding scale   SubCutaneous three times a day before meals  insulin lispro (HumaLOG) corrective regimen sliding scale   SubCutaneous at bedtime  levETIRAcetam 500 milliGRAM(s) Oral every 12 hours  lisinopril 40 milliGRAM(s) Oral daily  senna 2 Tablet(s) Oral at bedtime  sodium chloride 0.9% lock flush 3 milliLiter(s) IV Push every 8 hours  sotalol 120 milliGRAM(s) Oral two times a day    MEDICATIONS  (PRN):  acetaminophen   Tablet .. 650 milliGRAM(s) Oral every 6 hours PRN Temp greater or equal to 38C (100.4F), Mild Pain (1 - 3)  bisacodyl 5 milliGRAM(s) Oral every 12 hours PRN Constipation  dextrose 40% Gel 15 Gram(s) Oral once PRN Blood Glucose LESS THAN 70 milliGRAM(s)/deciliter  diphenhydrAMINE 25 milliGRAM(s) Oral every 4 hours PRN Rash and/or Itching  glucagon  Injectable 1 milliGRAM(s) IntraMuscular once PRN Glucose LESS THAN 70 milligrams/deciliter  LORazepam     Tablet 1 milliGRAM(s) Oral at bedtime PRN Anxiety  polyethylene glycol 3350 17 Gram(s) Oral every 24 hours PRN Constipation      PHYSICAL EXAM:  Vital Signs Last 24 Hrs  T(C): 37.2 (19 Mar 2019 20:34), Max: 37.2 (19 Mar 2019 20:34)  T(F): 99 (19 Mar 2019 20:34), Max: 99 (19 Mar 2019 20:34)  HR: 61 (19 Mar 2019 20:34) (61 - 65)  BP: 131/68 (19 Mar 2019 20:34) (121/65 - 167/78)  BP(mean): --  RR: 18 (19 Mar 2019 20:34) (18 - 19)  SpO2: 97% (19 Mar 2019 20:34) (96% - 98%)  I&O's Summary    18 Mar 2019 07:01  -  19 Mar 2019 07:00  --------------------------------------------------------  IN: 460 mL / OUT: 0 mL / NET: 460 mL      GENERAL: NAD, well-developed  HEAD:  Atraumatic, Normocephalic  EYES: EOMI, PERRLA, conjunctiva and sclera clear  NECK: Supple, No JVD  CHEST/LUNG: Clear to auscultation bilaterally; No wheeze  HEART: Regular rate and rhythm; No murmurs, rubs, or gallops  ABDOMEN: Soft, Nontender, Nondistended; Bowel sounds present  EXTREMITIES:  2+ Peripheral Pulses, No clubbing, cyanosis, or edema  PSYCH: AAOx3  NEUROLOGY: non-focal  SKIN: No rashes or lesions    LABS:  CAPILLARY BLOOD GLUCOSE      POCT Blood Glucose.: 166 mg/dL (19 Mar 2019 21:23)  POCT Blood Glucose.: 142 mg/dL (19 Mar 2019 17:27)  POCT Blood Glucose.: 123 mg/dL (19 Mar 2019 12:31)  POCT Blood Glucose.: 155 mg/dL (19 Mar 2019 08:02)                          13.8   8.05  )-----------( 200      ( 19 Mar 2019 12:20 )             42.3     03-19    139  |  102  |  21  ----------------------------<  176<H>  4.5   |  21<L>  |  0.87    Ca    10.0      19 Mar 2019 07:03      PT/INR - ( 19 Mar 2019 17:27 )   PT: 12.6 sec;   INR: 1.10 ratio         PTT - ( 19 Mar 2019 17:27 )  PTT:32.1 sec  CARDIAC MARKERS ( 18 Mar 2019 13:41 )  x     / x     / 82 U/L / x     / x              RADIOLOGY & ADDITIONAL TESTS:    Imaging Personally Reviewed:    Consultant(s) Notes Reviewed:      Care Discussed with Consultants/Other Providers:

## 2019-03-19 NOTE — PHYSICAL THERAPY INITIAL EVALUATION ADULT - PLANNED THERAPY INTERVENTIONS, PT EVAL
bed mobility training/transfer training/gait training/Stair Training: Goal: Improve to 10 steps I with single rail, step to pattern by 4 weeks.

## 2019-03-19 NOTE — PHYSICAL THERAPY INITIAL EVALUATION ADULT - IMPAIRED TRANSFERS: SIT/STAND, REHAB EVAL
impaired balance/impaired coordination/impaired motor control/impaired postural control/decreased strength/ataxic

## 2019-03-19 NOTE — CHART NOTE - NSCHARTNOTEFT_GEN_A_CORE
internal medicine care will be taken over by Dr. Blair today. He took care of the patient during the last admission 1/2019. internal medicine care will be taken over by Dr. Blair today. He took care of the patient during the last admission 1/2019 and he was informed of current stay.

## 2019-03-19 NOTE — PHYSICAL THERAPY INITIAL EVALUATION ADULT - ADDITIONAL COMMENTS
PTA pt lived in pvt home with wife, 4 steps to enter +HR, flight to bedroom +HR, pt was independent however was developing inc difficulty with ambulation, using his wife's RW at times, hx falls.

## 2019-03-19 NOTE — PHYSICAL THERAPY INITIAL EVALUATION ADULT - CRITERIA FOR SKILLED THERAPEUTIC INTERVENTIONS
anticipated discharge recommendation/functional limitations in following categories/risk reduction/prevention/rehab potential/impairments found

## 2019-03-19 NOTE — CHART NOTE - NSCHARTNOTEFT_GEN_A_CORE
CAPRINI SCORE [CLOT] Score on Admission for     AGE RELATED RISK FACTORS                                                       MOBILITY RELATED FACTORS  [ ] Age 41-60 years                                            (1 Point)                  [ ] Bed rest                                                        (1 Point)  [ ] Age: 61-74 years                                           (2 Points)                 [ ] Plaster cast                                                   (2 Points)  [ X] Age= 75 years                                              (3 Points)                 [ ] Bed bound for more than 72 hours                 (2 Points)    DISEASE RELATED RISK FACTORS                                               GENDER SPECIFIC FACTORS  [ ] Edema in the lower extremities                       (1 Point)                  [ ] Pregnancy                                                     (1 Point)  [ ] Varicose veins                                               (1 Point)                  [ ] Post-partum < 6 weeks                                   (1 Point)             [X ] BMI > 25 Kg/m2                                            (1 Point)                  [ ] Hormonal therapy  or oral contraception          (1 Point)                 [ ] Sepsis (in the previous month)                        (1 Point)                  [ ] History of pregnancy complications                 (1 point)  [ ] Pneumonia or serious lung disease                                               [ ] Unexplained or recurrent                     (1 Point)           (in the previous month)                               (1 Point)  [ ] Abnormal pulmonary function test                     (1 Point)                 SURGERY RELATED RISK FACTORS (include planned surgeries)  [ ] Acute myocardial infarction                              (1 Point)                 [ ]  Section                                             (1 Point)  [ ] Congestive heart failure (in the previous month)  (1 Point)         [ ] Minor surgery                                                  (1 Point)   [ ] Inflammatory bowel disease                             (1 Point)                 [ ] Arthroscopic surgery                                        (2 Points)  [ ] Central venous access                                      (2 Points)                [ ] General surgery lasting more than 45 minutes   (2 Points)       [ ] Stroke (in the previous month)                          (5 Points)               [ ] Elective arthroplasty                                         (5 Points)            [ ] current or past malignancy                              (2 Points)                                                                                                       HEMATOLOGY RELATED FACTORS                                                 TRAUMA RELATED RISK FACTORS  [ ] Prior episodes of VTE                                     (3 Points)                [ ] Fracture of the hip, pelvis, or leg                       (5 Points)  [ ] Positive family history for VTE                         (3 Points)                 [ ] Acute spinal cord injury (in the previous month)  (5 Points)  [ ] Prothrombin 79588 A                                     (3 Points)                 [ ] Paralysis  (less than 1 month)                             (5 Points)  [ ] Factor V Leiden                                             (3 Points)                  [ ] Multiple Trauma within 1 month                        (5 Points)  [ ] Lupus anticoagulants                                     (3 Points)                                                           [ ] Anticardiolipin antibodies                               (3 Points)                                                       [ ] High homocysteine in the blood                      (3 Points)                                             [ ] Other congenital or acquired thrombophilia      (3 Points)                                                [ ] Heparin induced thrombocytopenia                  (3 Points)                                          Total Score [   3       ]    Risk:  Very low 0   Low 1 to 2   Moderate 3 to 4   High =5       VTE Prophylasix Recommednations:  [ X] mechanical pneumatic compression devices                                      [ ] contraindicated: _____________________  [ ] chemo prophylasix                                                                                   [X ] contraindicated ______preop for surgery in AM_______________    **** HIGH LIKELIHOOD DVT PRESENT ON ADMISSION  [ ] (please order LE dopplers within 24 hours of admission)

## 2019-03-20 ENCOUNTER — RESULT REVIEW (OUTPATIENT)
Age: 79
End: 2019-03-20

## 2019-03-20 ENCOUNTER — APPOINTMENT (OUTPATIENT)
Dept: NEUROSURGERY | Facility: HOSPITAL | Age: 79
End: 2019-03-20

## 2019-03-20 ENCOUNTER — APPOINTMENT (OUTPATIENT)
Dept: CARDIOLOGY | Facility: CLINIC | Age: 79
End: 2019-03-20

## 2019-03-20 DIAGNOSIS — S06.5X9A TRAUMATIC SUBDURAL HEMORRHAGE WITH LOSS OF CONSCIOUSNESS OF UNSPECIFIED DURATION, INITIAL ENCOUNTER: ICD-10-CM

## 2019-03-20 DIAGNOSIS — I48.2 CHRONIC ATRIAL FIBRILLATION: ICD-10-CM

## 2019-03-20 DIAGNOSIS — I10 ESSENTIAL (PRIMARY) HYPERTENSION: ICD-10-CM
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
## 2019-03-20 DIAGNOSIS — E11.9 TYPE 2 DIABETES MELLITUS WITHOUT COMPLICATIONS: ICD-10-CM

## 2019-03-20 PROCEDURE — 61312 CRNEC/CRNOT STTL XDRL/SDRL: CPT

## 2019-03-20 PROCEDURE — 99291 CRITICAL CARE FIRST HOUR: CPT

## 2019-03-20 PROCEDURE — 88305 TISSUE EXAM BY PATHOLOGIST: CPT | Mod: 26

## 2019-03-20 PROCEDURE — 70450 CT HEAD/BRAIN W/O DYE: CPT | Mod: 26

## 2019-03-20 RX ORDER — LEVETIRACETAM 250 MG/1
1000 TABLET, FILM COATED ORAL
Qty: 0 | Refills: 0 | Status: DISCONTINUED | OUTPATIENT
Start: 2019-03-20 | End: 2019-03-20

## 2019-03-20 RX ORDER — DEXTROSE MONOHYDRATE, SODIUM CHLORIDE, AND POTASSIUM CHLORIDE 50; .745; 4.5 G/1000ML; G/1000ML; G/1000ML
1000 INJECTION, SOLUTION INTRAVENOUS
Qty: 0 | Refills: 0 | Status: DISCONTINUED | OUTPATIENT
Start: 2019-03-20 | End: 2019-03-21

## 2019-03-20 RX ORDER — ONDANSETRON 8 MG/1
4 TABLET, FILM COATED ORAL EVERY 6 HOURS
Qty: 0 | Refills: 0 | Status: DISCONTINUED | OUTPATIENT
Start: 2019-03-20 | End: 2019-03-25

## 2019-03-20 RX ORDER — FENTANYL CITRATE 50 UG/ML
25 INJECTION INTRAVENOUS ONCE
Qty: 0 | Refills: 0 | Status: DISCONTINUED | OUTPATIENT
Start: 2019-03-20 | End: 2019-03-20

## 2019-03-20 RX ORDER — CEFAZOLIN SODIUM 1 G
2000 VIAL (EA) INJECTION EVERY 8 HOURS
Qty: 0 | Refills: 0 | Status: COMPLETED | OUTPATIENT
Start: 2019-03-20 | End: 2019-03-21

## 2019-03-20 RX ORDER — LEVETIRACETAM 250 MG/1
1000 TABLET, FILM COATED ORAL EVERY 12 HOURS
Qty: 0 | Refills: 0 | Status: DISCONTINUED | OUTPATIENT
Start: 2019-03-20 | End: 2019-03-21

## 2019-03-20 RX ORDER — CHLORHEXIDINE GLUCONATE 213 G/1000ML
1 SOLUTION TOPICAL
Qty: 0 | Refills: 0 | Status: DISCONTINUED | OUTPATIENT
Start: 2019-03-20 | End: 2019-03-23

## 2019-03-20 RX ADMIN — FENTANYL CITRATE 25 MICROGRAM(S): 50 INJECTION INTRAVENOUS at 22:45

## 2019-03-20 RX ADMIN — SODIUM CHLORIDE 3 MILLILITER(S): 9 INJECTION INTRAMUSCULAR; INTRAVENOUS; SUBCUTANEOUS at 13:20

## 2019-03-20 RX ADMIN — LEVETIRACETAM 500 MILLIGRAM(S): 250 TABLET, FILM COATED ORAL at 05:32

## 2019-03-20 RX ADMIN — SODIUM CHLORIDE 3 MILLILITER(S): 9 INJECTION INTRAMUSCULAR; INTRAVENOUS; SUBCUTANEOUS at 05:27

## 2019-03-20 RX ADMIN — LISINOPRIL 40 MILLIGRAM(S): 2.5 TABLET ORAL at 05:32

## 2019-03-20 RX ADMIN — FENTANYL CITRATE 25 MICROGRAM(S): 50 INJECTION INTRAVENOUS at 22:30

## 2019-03-20 RX ADMIN — LEVETIRACETAM 500 MILLIGRAM(S): 250 TABLET, FILM COATED ORAL at 17:30

## 2019-03-20 RX ADMIN — Medication 1: at 13:19

## 2019-03-20 RX ADMIN — Medication 120 MILLIGRAM(S): at 17:30

## 2019-03-20 RX ADMIN — SODIUM CHLORIDE 50 MILLILITER(S): 9 INJECTION INTRAMUSCULAR; INTRAVENOUS; SUBCUTANEOUS at 00:01

## 2019-03-20 RX ADMIN — AMLODIPINE BESYLATE 10 MILLIGRAM(S): 2.5 TABLET ORAL at 05:32

## 2019-03-20 RX ADMIN — Medication 100 MILLIGRAM(S): at 05:32

## 2019-03-20 RX ADMIN — Medication 120 MILLIGRAM(S): at 05:32

## 2019-03-20 RX ADMIN — Medication 1: at 08:58

## 2019-03-20 NOTE — PROGRESS NOTE ADULT - SUBJECTIVE AND OBJECTIVE BOX
Patient is a 78y old  Male who presents with a chief complaint of b/l SDH (19 Mar 2019 10:31)      SUBJECTIVE / OVERNIGHT EVENTS:  No new symptoms. Scheduled for surgery today  Review of Systems:   CONSTITUTIONAL: No fever, weight loss, or fatigue  EYES: No eye pain, visual disturbances, or discharge  ENMT:  No difficulty hearing, tinnitus, vertigo; No sinus or throat pain  NECK: No pain or stiffness  BREASTS: No pain, masses, or nipple discharge  RESPIRATORY: No cough, wheezing, chills or hemoptysis; No shortness of breath  CARDIOVASCULAR: No chest pain, palpitations, dizziness, or leg swelling  GASTROINTESTINAL: No abdominal or epigastric pain. No nausea, vomiting, or hematemesis; No diarrhea or constipation. No melena or hematochezia.  GENITOURINARY: No dysuria, frequency, hematuria, or incontinence  NEUROLOGICAL: No headaches, memory loss, loss of strength, numbness, or tremors  SKIN: No itching, burning, rashes, or lesions   LYMPH NODES: No enlarged glands  ENDOCRINE: No heat or cold intolerance; No hair loss  MUSCULOSKELETAL: No joint pain or swelling; No muscle, back, or extremity pain  PSYCHIATRIC: No depression, anxiety, mood swings, or difficulty sleeping  HEME/LYMPH: No easy bruising, or bleeding gums  ALLERY AND IMMUNOLOGIC: No hives or eczema    MEDICATIONS  (STANDING):  amLODIPine   Tablet 10 milliGRAM(s) Oral daily  dextrose 5%. 1000 milliLiter(s) (50 mL/Hr) IV Continuous <Continuous>  dextrose 50% Injectable 12.5 Gram(s) IV Push once  dextrose 50% Injectable 25 Gram(s) IV Push once  dextrose 50% Injectable 25 Gram(s) IV Push once  docusate sodium 100 milliGRAM(s) Oral three times a day  insulin lispro (HumaLOG) corrective regimen sliding scale   SubCutaneous three times a day before meals  insulin lispro (HumaLOG) corrective regimen sliding scale   SubCutaneous at bedtime  levETIRAcetam 500 milliGRAM(s) Oral every 12 hours  lisinopril 40 milliGRAM(s) Oral daily  senna 2 Tablet(s) Oral at bedtime  sodium chloride 0.9% lock flush 3 milliLiter(s) IV Push every 8 hours  sotalol 120 milliGRAM(s) Oral two times a day    MEDICATIONS  (PRN):  acetaminophen   Tablet .. 650 milliGRAM(s) Oral every 6 hours PRN Temp greater or equal to 38C (100.4F), Mild Pain (1 - 3)  bisacodyl 5 milliGRAM(s) Oral every 12 hours PRN Constipation  dextrose 40% Gel 15 Gram(s) Oral once PRN Blood Glucose LESS THAN 70 milliGRAM(s)/deciliter  diphenhydrAMINE 25 milliGRAM(s) Oral every 4 hours PRN Rash and/or Itching  glucagon  Injectable 1 milliGRAM(s) IntraMuscular once PRN Glucose LESS THAN 70 milligrams/deciliter  LORazepam     Tablet 1 milliGRAM(s) Oral at bedtime PRN Anxiety  polyethylene glycol 3350 17 Gram(s) Oral every 24 hours PRN Constipation      PHYSICAL EXAM:  Vital Signs Last 24 Hrs  T(C): 37.2 (19 Mar 2019 20:34), Max: 37.2 (19 Mar 2019 20:34)  T(F): 99 (19 Mar 2019 20:34), Max: 99 (19 Mar 2019 20:34)  HR: 61 (19 Mar 2019 20:34) (61 - 65)  BP: 131/68 (19 Mar 2019 20:34) (121/65 - 167/78)  BP(mean): --  RR: 18 (19 Mar 2019 20:34) (18 - 19)  SpO2: 97% (19 Mar 2019 20:34) (96% - 98%)  I&O's Summary    18 Mar 2019 07:01  -  19 Mar 2019 07:00  --------------------------------------------------------  IN: 460 mL / OUT: 0 mL / NET: 460 mL      GENERAL: NAD, well-developed  HEAD:  Atraumatic, Normocephalic  EYES: EOMI, PERRLA, conjunctiva and sclera clear  NECK: Supple, No JVD  CHEST/LUNG: Clear to auscultation bilaterally; No wheeze  HEART: Regular rate and rhythm; No murmurs, rubs, or gallops  ABDOMEN: Soft, Nontender, Nondistended; Bowel sounds present  EXTREMITIES:  2+ Peripheral Pulses, No clubbing, cyanosis, or edema  PSYCH: AAOx3  NEUROLOGY: non-focal  SKIN: No rashes or lesions    LABS:  CAPILLARY BLOOD GLUCOSE      POCT Blood Glucose.: 166 mg/dL (19 Mar 2019 21:23)  POCT Blood Glucose.: 142 mg/dL (19 Mar 2019 17:27)  POCT Blood Glucose.: 123 mg/dL (19 Mar 2019 12:31)  POCT Blood Glucose.: 155 mg/dL (19 Mar 2019 08:02)                          13.8   8.05  )-----------( 200      ( 19 Mar 2019 12:20 )             42.3     03-19    139  |  102  |  21  ----------------------------<  176<H>  4.5   |  21<L>  |  0.87    Ca    10.0      19 Mar 2019 07:03      PT/INR - ( 19 Mar 2019 17:27 )   PT: 12.6 sec;   INR: 1.10 ratio         PTT - ( 19 Mar 2019 17:27 )  PTT:32.1 sec  CARDIAC MARKERS ( 18 Mar 2019 13:41 )  x     / x     / 82 U/L / x     / x              RADIOLOGY & ADDITIONAL TESTS:    Imaging Personally Reviewed:    Consultant(s) Notes Reviewed:      Care Discussed with Consultants/Other Providers:

## 2019-03-20 NOTE — PROGRESS NOTE ADULT - SUBJECTIVE AND OBJECTIVE BOX
SUMMARY:  78 year-old man with atrial fibrillation s/p AICD on apixaban suffered multiple falls since January 2019 with worsening gait over the 2-3 week period prior to admission who was found to have R>L acute on chronic subdural hematoma with exam notable for LUE pronator drift, shuffling gait and RUE mild resting tremor. On 3/20/19, he underwent right mini craniotomy and left [place] hole for evacuation.    24 HOUR EVENTS:  Admitted to NSCU intubated.    VITALS/DATA/ORDERS: [x] Reviewed    EXAMINATION:  Awake, alert, fully oriented, pointing at tube, follows briskly, moves all limbs strongly

## 2019-03-20 NOTE — PROGRESS NOTE ADULT - ASSESSMENT
ASSESSMENT/PLAN: traumatic subdural hematomas  - CT head in AM  - Monitor surgical drain output  - Seizure prophylaxis  - Wean to extubate    30 minutes critical care time

## 2019-03-20 NOTE — BRIEF OPERATIVE NOTE - NSICDXBRIEFPROCEDURE_GEN_ALL_CORE_FT
PROCEDURES:  Craniotomy on both sides of brain for control of subdural hemorrhage 21-Mar-2019 11:12:25  Grady Yung

## 2019-03-21 LAB
ANION GAP SERPL CALC-SCNC: 12 MMOL/L — SIGNIFICANT CHANGE UP (ref 5–17)
ANION GAP SERPL CALC-SCNC: 15 MMOL/L — SIGNIFICANT CHANGE UP (ref 5–17)
BUN SERPL-MCNC: 23 MG/DL — SIGNIFICANT CHANGE UP (ref 7–23)
BUN SERPL-MCNC: 31 MG/DL — HIGH (ref 7–23)
CALCIUM SERPL-MCNC: 8.6 MG/DL — SIGNIFICANT CHANGE UP (ref 8.4–10.5)
CALCIUM SERPL-MCNC: 8.9 MG/DL — SIGNIFICANT CHANGE UP (ref 8.4–10.5)
CHLORIDE SERPL-SCNC: 105 MMOL/L — SIGNIFICANT CHANGE UP (ref 96–108)
CHLORIDE SERPL-SCNC: 107 MMOL/L — SIGNIFICANT CHANGE UP (ref 96–108)
CO2 SERPL-SCNC: 19 MMOL/L — LOW (ref 22–31)
CO2 SERPL-SCNC: 21 MMOL/L — LOW (ref 22–31)
CREAT SERPL-MCNC: 0.86 MG/DL — SIGNIFICANT CHANGE UP (ref 0.5–1.3)
CREAT SERPL-MCNC: 1 MG/DL — SIGNIFICANT CHANGE UP (ref 0.5–1.3)
GAS PNL BLDA: SIGNIFICANT CHANGE UP
GLUCOSE SERPL-MCNC: 165 MG/DL — HIGH (ref 70–99)
GLUCOSE SERPL-MCNC: 230 MG/DL — HIGH (ref 70–99)
GRAM STN FLD: SIGNIFICANT CHANGE UP
HCT VFR BLD CALC: 37.1 % — LOW (ref 39–50)
HGB BLD-MCNC: 12.2 G/DL — LOW (ref 13–17)
MAGNESIUM SERPL-MCNC: 1.9 MG/DL — SIGNIFICANT CHANGE UP (ref 1.6–2.6)
MAGNESIUM SERPL-MCNC: 2 MG/DL — SIGNIFICANT CHANGE UP (ref 1.6–2.6)
MCHC RBC-ENTMCNC: 31.3 PG — SIGNIFICANT CHANGE UP (ref 27–34)
MCHC RBC-ENTMCNC: 33 GM/DL — SIGNIFICANT CHANGE UP (ref 32–36)
MCV RBC AUTO: 94.9 FL — SIGNIFICANT CHANGE UP (ref 80–100)
NIGHT BLUE STAIN TISS: SIGNIFICANT CHANGE UP
PLATELET # BLD AUTO: 160 K/UL — SIGNIFICANT CHANGE UP (ref 150–400)
POTASSIUM SERPL-MCNC: 3.7 MMOL/L — SIGNIFICANT CHANGE UP (ref 3.5–5.3)
POTASSIUM SERPL-MCNC: 4.6 MMOL/L — SIGNIFICANT CHANGE UP (ref 3.5–5.3)
POTASSIUM SERPL-SCNC: 3.7 MMOL/L — SIGNIFICANT CHANGE UP (ref 3.5–5.3)
POTASSIUM SERPL-SCNC: 4.6 MMOL/L — SIGNIFICANT CHANGE UP (ref 3.5–5.3)
RBC # BLD: 3.91 M/UL — LOW (ref 4.2–5.8)
RBC # FLD: 12.2 % — SIGNIFICANT CHANGE UP (ref 10.3–14.5)
SODIUM SERPL-SCNC: 138 MMOL/L — SIGNIFICANT CHANGE UP (ref 135–145)
SODIUM SERPL-SCNC: 141 MMOL/L — SIGNIFICANT CHANGE UP (ref 135–145)
SPECIMEN SOURCE: SIGNIFICANT CHANGE UP
SPECIMEN SOURCE: SIGNIFICANT CHANGE UP
WBC # BLD: 10.1 K/UL — SIGNIFICANT CHANGE UP (ref 3.8–10.5)
WBC # FLD AUTO: 10.1 K/UL — SIGNIFICANT CHANGE UP (ref 3.8–10.5)

## 2019-03-21 PROCEDURE — 99291 CRITICAL CARE FIRST HOUR: CPT

## 2019-03-21 PROCEDURE — 70450 CT HEAD/BRAIN W/O DYE: CPT | Mod: 26

## 2019-03-21 PROCEDURE — 99292 CRITICAL CARE ADDL 30 MIN: CPT

## 2019-03-21 RX ORDER — LISINOPRIL 2.5 MG/1
40 TABLET ORAL EVERY 12 HOURS
Qty: 0 | Refills: 0 | Status: DISCONTINUED | OUTPATIENT
Start: 2019-03-21 | End: 2019-03-25

## 2019-03-21 RX ORDER — MAGNESIUM SULFATE 500 MG/ML
1 VIAL (ML) INJECTION ONCE
Qty: 0 | Refills: 0 | Status: COMPLETED | OUTPATIENT
Start: 2019-03-21 | End: 2019-03-21

## 2019-03-21 RX ORDER — GLUCAGON INJECTION, SOLUTION 0.5 MG/.1ML
1 INJECTION, SOLUTION SUBCUTANEOUS ONCE
Qty: 0 | Refills: 0 | Status: DISCONTINUED | OUTPATIENT
Start: 2019-03-21 | End: 2019-03-25

## 2019-03-21 RX ORDER — LEVETIRACETAM 250 MG/1
500 TABLET, FILM COATED ORAL
Qty: 0 | Refills: 0 | Status: DISCONTINUED | OUTPATIENT
Start: 2019-03-21 | End: 2019-03-21

## 2019-03-21 RX ORDER — AMLODIPINE BESYLATE 2.5 MG/1
10 TABLET ORAL DAILY
Qty: 0 | Refills: 0 | Status: DISCONTINUED | OUTPATIENT
Start: 2019-03-21 | End: 2019-03-25

## 2019-03-21 RX ORDER — DEXTROSE 50 % IN WATER 50 %
15 SYRINGE (ML) INTRAVENOUS ONCE
Qty: 0 | Refills: 0 | Status: DISCONTINUED | OUTPATIENT
Start: 2019-03-21 | End: 2019-03-25

## 2019-03-21 RX ORDER — INSULIN LISPRO 100/ML
VIAL (ML) SUBCUTANEOUS EVERY 6 HOURS
Qty: 0 | Refills: 0 | Status: DISCONTINUED | OUTPATIENT
Start: 2019-03-21 | End: 2019-03-21

## 2019-03-21 RX ORDER — LACOSAMIDE 50 MG/1
100 TABLET ORAL
Qty: 0 | Refills: 0 | Status: DISCONTINUED | OUTPATIENT
Start: 2019-03-21 | End: 2019-03-22

## 2019-03-21 RX ORDER — DEXTROSE 50 % IN WATER 50 %
25 SYRINGE (ML) INTRAVENOUS ONCE
Qty: 0 | Refills: 0 | Status: DISCONTINUED | OUTPATIENT
Start: 2019-03-21 | End: 2019-03-25

## 2019-03-21 RX ORDER — DEXTROSE 50 % IN WATER 50 %
12.5 SYRINGE (ML) INTRAVENOUS ONCE
Qty: 0 | Refills: 0 | Status: DISCONTINUED | OUTPATIENT
Start: 2019-03-21 | End: 2019-03-25

## 2019-03-21 RX ORDER — SODIUM CHLORIDE 9 MG/ML
1000 INJECTION, SOLUTION INTRAVENOUS
Qty: 0 | Refills: 0 | Status: DISCONTINUED | OUTPATIENT
Start: 2019-03-21 | End: 2019-03-25

## 2019-03-21 RX ORDER — DOCUSATE SODIUM 100 MG
100 CAPSULE ORAL
Qty: 0 | Refills: 0 | Status: DISCONTINUED | OUTPATIENT
Start: 2019-03-21 | End: 2019-03-24

## 2019-03-21 RX ORDER — SOTALOL HCL 120 MG
120 TABLET ORAL
Qty: 0 | Refills: 0 | Status: DISCONTINUED | OUTPATIENT
Start: 2019-03-21 | End: 2019-03-25

## 2019-03-21 RX ORDER — ENOXAPARIN SODIUM 100 MG/ML
40 INJECTION SUBCUTANEOUS
Qty: 0 | Refills: 0 | Status: DISCONTINUED | OUTPATIENT
Start: 2019-03-22 | End: 2019-03-25

## 2019-03-21 RX ORDER — INSULIN LISPRO 100/ML
VIAL (ML) SUBCUTANEOUS
Qty: 0 | Refills: 0 | Status: DISCONTINUED | OUTPATIENT
Start: 2019-03-21 | End: 2019-03-25

## 2019-03-21 RX ORDER — POTASSIUM CHLORIDE 20 MEQ
10 PACKET (EA) ORAL
Qty: 0 | Refills: 0 | Status: COMPLETED | OUTPATIENT
Start: 2019-03-21 | End: 2019-03-21

## 2019-03-21 RX ADMIN — Medication 100 MILLIGRAM(S): at 13:26

## 2019-03-21 RX ADMIN — Medication 100 MILLIEQUIVALENT(S): at 03:00

## 2019-03-21 RX ADMIN — Medication 2: at 17:31

## 2019-03-21 RX ADMIN — DEXTROSE MONOHYDRATE, SODIUM CHLORIDE, AND POTASSIUM CHLORIDE 75 MILLILITER(S): 50; .745; 4.5 INJECTION, SOLUTION INTRAVENOUS at 05:20

## 2019-03-21 RX ADMIN — Medication 4: at 23:17

## 2019-03-21 RX ADMIN — Medication 4: at 13:20

## 2019-03-21 RX ADMIN — Medication 0.5 MILLIGRAM(S): at 22:06

## 2019-03-21 RX ADMIN — LEVETIRACETAM 400 MILLIGRAM(S): 250 TABLET, FILM COATED ORAL at 05:40

## 2019-03-21 RX ADMIN — Medication 100 GRAM(S): at 03:37

## 2019-03-21 RX ADMIN — CHLORHEXIDINE GLUCONATE 1 APPLICATION(S): 213 SOLUTION TOPICAL at 22:07

## 2019-03-21 RX ADMIN — LISINOPRIL 40 MILLIGRAM(S): 2.5 TABLET ORAL at 23:50

## 2019-03-21 RX ADMIN — Medication 100 MILLIGRAM(S): at 23:12

## 2019-03-21 RX ADMIN — Medication 100 MILLIEQUIVALENT(S): at 05:00

## 2019-03-21 RX ADMIN — Medication 100 MILLIGRAM(S): at 05:20

## 2019-03-21 RX ADMIN — LEVETIRACETAM 500 MILLIGRAM(S): 250 TABLET, FILM COATED ORAL at 17:34

## 2019-03-21 RX ADMIN — AMLODIPINE BESYLATE 10 MILLIGRAM(S): 2.5 TABLET ORAL at 23:12

## 2019-03-21 RX ADMIN — Medication 120 MILLIGRAM(S): at 23:50

## 2019-03-21 NOTE — DIETITIAN INITIAL EVALUATION ADULT. - SIGNS/SYMPTOMS
pt s/p mini craniotomy, left ja hole for evacuation of subdural hematoma pt with significant wt loss (6.03%) x 3 weeks, consumption of < or equal to 75% of meals x 3 weeks

## 2019-03-21 NOTE — DIETITIAN INITIAL EVALUATION ADULT. - ADHERENCE
Spouse denied that pt followed therapeutic diet PTA. Expressed pt consumed a variety of foods (chicken, fish, beef, pork, vegetables, rice).

## 2019-03-21 NOTE — AIRWAY REMOVAL NOTE  ADULT & PEDS - ARTIFICAL AIRWAY REMOVAL COMMENTS
Written order for extubation verified. The patient was identified by full name and birth date compared to the identification band. Present during the procedure was VLADIMIR Blood.

## 2019-03-21 NOTE — PROGRESS NOTE ADULT - SUBJECTIVE AND OBJECTIVE BOX
SUMMARY:  78 year-old man with atrial fibrillation s/p AICD on apixaban suffered multiple falls since January 2019 with worsening gait over the 2-3 week period prior to admission who was found to have R>L acute on chronic subdural hematoma with exam notable for LUE pronator drift, shuffling gait and RUE mild resting tremor. On 3/20/19, he underwent right mini craniotomy and left [place] hole for evacuation.    24 HOUR EVENTS:  Admitted to NSCU intubated.    VITALS/DATA/ORDERS: [x] Reviewed    EXAMINATION:  Awake, alert, fully oriented, pointing at tube, follows briskly, moves all limbs strongly SUMMARY:  78 year-old man with atrial fibrillation s/p AICD on apixaban suffered multiple falls since January 2019 with worsening gait over the 2-3 week period prior to admission who was found to have R>L acute on chronic subdural hematoma with exam notable for LUE pronator drift, shuffling gait and RUE mild resting tremor. On 3/20/19, he underwent right mini craniotomy and left [place] hole for evacuation.    24 HOUR EVENTS:  Admitted to NSCU. Extubated overnight.     VITALS/DATA/ORDERS: [x] Reviewed    EXAMINATION: SUMMARY:  78 year-old man with atrial fibrillation s/p AICD on apixaban suffered multiple falls since January 2019 with worsening gait over the 2-3 week period prior to admission who was found to have R>L acute on chronic subdural hematoma with exam notable for LUE pronator drift, shuffling gait and RUE mild resting tremor. On 3/20/19, he underwent right mini craniotomy and left [place] hole for evacuation.    24 HOUR EVENTS:  Admitted to NSCU. Extubated overnight.     VITALS/DATA/ORDERS: [x] Reviewed    EXAMINATION:   General: Well developed male in NAD  HEENT: PERRL. EOMI.  Neuro: A&O x3. CN 2-12 grossly intact. b/l UE 5/5. B/l LE - 5/5.  Cardiovascular: RRR.  Resp: CTAB. No wheezes/rales/rhonci.  GI: Soft, NT, ND.  Extremities: No edema

## 2019-03-21 NOTE — DIETITIAN INITIAL EVALUATION ADULT. - NS AS NUTRI INTERV MEALS SNACK
General/healthful diet/1) Advance diet as tolerated to consistent CHO diet (evening snack). 2) Honor dietary preferences as expressed accounting for diet restriction. 3) Recommend Glucerna Shakes twice daily (+220kcal, +10g protein/serving); monitor tolerance/acceptance. 4) Educated spouse on menus provided daily, encouraged utilization of same to promote maximum PO intake potential. 5) Obtain current wt, conduct full Nutrition Focused Physical Assessment as able. General/healthful diet/1) Advance diet as tolerated to consistent CHO diet (evening snack). 2) Honor dietary preferences as expressed accounting for diet restriction. 3) Recommend Glucerna Shakes twice daily (+220kcal, +10g protein/serving); monitor tolerance/acceptance. 4) Educated spouse on menus provided daily, encouraged utilization of same to promote maximum PO intake potential. 5) Obtain current wt, conduct full Nutrition Focused Physical Assessment as able. 6) Recommend [place] Active twice daily.

## 2019-03-21 NOTE — DIETITIAN INITIAL EVALUATION ADULT. - NS AS NUTRI INTERV MEALS SNACK3
1) Advance diet as tolerated to consistent CHO diet (evening snack). 2) Honor dietary preferences as expressed accounting for diet restriction. 3) Recommend Glucerna Shakes twice daily (+220kcal, +10g protein/serving); monitor tolerance/acceptance. 4) Educated spouse on menus provided daily, encouraged utilization of same to promote maximum PO intake potential. 5) Obtain current wt, conduct full Nutrition Focused Physical Assessment as able./General/healthful diet

## 2019-03-21 NOTE — DIETITIAN INITIAL EVALUATION ADULT. - FACTORS AFF FOOD INTAKE
Decrease in overall appetite x 2-3 weeks. Spouse reports slight difficulty in swallowing, which may contribute to overall decrease in PO intake.

## 2019-03-21 NOTE — PROGRESS NOTE ADULT - SUBJECTIVE AND OBJECTIVE BOX
Vital Signs Last 24 Hrs  T(C): 37 (20 Mar 2019 20:55), Max: 37.9 (20 Mar 2019 16:43)  T(F): 98.6 (20 Mar 2019 20:55), Max: 100.2 (20 Mar 2019 16:43)  HR: 61 (20 Mar 2019 23:07) (60 - 93)  BP: 98/55 (20 Mar 2019 22:30) (98/55 - 165/75)  BP(mean): 69 (20 Mar 2019 22:30) (69 - 115)  RR: 14 (20 Mar 2019 22:30) (12 - 19)  SpO2: 100% (20 Mar 2019 23:07) (96% - 100%)    EXAM  intubated, awake, FC, CHRISTOPHER

## 2019-03-21 NOTE — CHART NOTE - NSCHARTNOTEFT_GEN_A_CORE
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   2) Honor dietary preferences as expressed accounting for diet restriction.   3) Recommend Glucerna Shakes twice daily (+220kcal, +10g protein/serving); monitor tolerance/acceptance.   4) Educated spouse on menus provided daily, encouraged utilization of same to promote maximum PO intake potential.   5) Obtain current wt, conduct full Nutrition Focused Physical Assessment as able.    PROVIDER Section:     By signing this assessment you are acknowledging and agree with the diagnosis/diagnoses assigned by the Registered Dietitian    Comments:

## 2019-03-21 NOTE — DIETITIAN INITIAL EVALUATION ADULT. - ENERGY NEEDS
Pt is a 79 yo M with PMH: A.Fib s/p AICD on apixaban, suffered multiple falls since 1/2019 with worsening gait over 2-3 week period prior to admission. Found to have R>L acute on chronic subdural hematoma with exam notable for LUE pronator drift, shuffling gait and RUE mild resting tremor. Pt s/p right mini craniotomy and left ja hole for evacuation (POD#1).     Ht: 67"   Wt: 186.9 lbs   BMI: 29.3 kg/m2   IBW: 148  (+/-10%)    126 % IBW  Edema: None noted          Skin: No pressure injuries noted.

## 2019-03-21 NOTE — DIETITIAN INITIAL EVALUATION ADULT. - OTHER INFO
Pt seen for length of stay in NSCU. Per discussion with spouse, reports UBW: 199 lbs. Expressed likelihood of wt loss in context of overall decreased PO intake x 2-3 weeks. Believes current wt ~170 lbs. However, dosing wt upon current admission 186.9 lbs. Will request reweight to determine accurate wt. As noted above, pt with slight intolerance to swallowing at this time, attributes to "feeling ill" and drowsiness/on heavy meds to sleep. However, at baseline, no difficulty in chewing/swallowing noted. Pt with dental implants. Reports pt does not take oral vitamins/supplements at home, states "he is against that stuff", gets nutrients via food. Reports pt with hx of chronic constipation, however not on bowel med regimen. No BM's noted in-house at this time, will monitor.

## 2019-03-21 NOTE — DIETITIAN INITIAL EVALUATION ADULT. - PHYSICAL APPEARANCE
Unable to conduct Nutrition Focused Physical Assessment at this time, pt in process of being transferred to CT scan. Will conduct full exam as able. Per discussion with spouse, reports noticable overall decrease in size of frame x past 2-3 weeks.

## 2019-03-21 NOTE — DIETITIAN INITIAL EVALUATION ADULT. - ETIOLOGY
increased demand for nutrient in the setting of brain injury, neurosurgical intervention decreased appetite x 3 weeks in context of illness/unsteady gait/drowsiness, subdural hematoma

## 2019-03-21 NOTE — DIETITIAN INITIAL EVALUATION ADULT. - ORAL INTAKE PTA
fair/Per discussion with spouse, reports pt has been eating smaller portions x past 2-3 weeks in context of "not feeling well", "uncomfortable", "feeling sick". Believes that pt is still eating adequate amounts, however ~50-75% of baseline. Admits that pt prefers "moist foods", foods that "go down easy", including foods with sauces and gravies. Stated that pt with slight difficulty in swallowing past 2-3 weeks; explains "foods go down slower". Attributes to feeling ill, drowsiness "on heavy meds to sleep". Dislikes: dry cereal, vegetable soups (pea, tomato). Enjoys: Asian foods, hard boiled eggs, scrambled eggs, bananas.

## 2019-03-21 NOTE — PROGRESS NOTE ADULT - SUBJECTIVE AND OBJECTIVE BOX
O:    T(C): 36.8 (03-21-19 @ 15:00), Max: 37.6 (03-20-19 @ 23:00)  HR: 64 (03-21-19 @ 18:00) (60 - 95)  BP: 145/73 (03-21-19 @ 18:00) (98/55 - 165/75)  RR: 15 (03-21-19 @ 18:00) (11 - 21)  SpO2: 98% (03-21-19 @ 18:00) (95% - 100%)  03-20-19 @ 07:01  -  03-21-19 @ 07:00  --------------------------------------------------------  IN: 1340 mL / OUT: 665 mL / NET: 675 mL    03-21-19 @ 07:01  -  03-21-19 @ 19:21  --------------------------------------------------------  IN: 1355 mL / OUT: 315 mL / NET: 1040 mL    chlorhexidine 4% Liquid 1 Application(s) Topical <User Schedule>  dextrose 40% Gel 15 Gram(s) Oral once PRN  dextrose 5%. 1000 milliLiter(s) IV Continuous <Continuous>  dextrose 50% Injectable 12.5 Gram(s) IV Push once  dextrose 50% Injectable 25 Gram(s) IV Push once  dextrose 50% Injectable 25 Gram(s) IV Push once  glucagon  Injectable 1 milliGRAM(s) IntraMuscular once PRN  insulin lispro (HumaLOG) corrective regimen sliding scale   SubCutaneous Before meals and at bedtime  levETIRAcetam 500 milliGRAM(s) Oral two times a day  ondansetron Injectable 4 milliGRAM(s) IV Push every 6 hours PRN    Culture - Tissue with Gram Stain (collected 03-21-19 @ 04:03)  Source: .Tissue Other, right subdural membrane  Gram Stain (03-21-19 @ 06:55):    No polymorphonuclear cells seen per low power field    No organisms seen per oil power field    Culture - Fungal, Tissue (collected 03-21-19 @ 04:03)  Source: .Tissue right subdural membrane  Preliminary Report (03-21-19 @ 12:00):    Testing in progress    Culture - Acid Fast - Tissue w/Smear (collected 03-21-19 @ 04:03)  Source: .Tissue Other, right subdural membrane    Mode: CPAP with PS, FiO2: 40, PEEP: 5, PS: 5, MAP: 8, PIP: 11  EXAMINATION:   General: Well developed male in NAD  HEENT: PERRL. EOMI.  Neuro: A&O x3. CN 2-12 grossly intact. b/l UE 5/5. B/l LE - 5/5.  Cardiovascular: RRR.  Resp: CTAB. No wheezes/rales/rhonci.  GI: Soft, NT, ND.  Extremities: No edema      LABS:  Na: 141 (03-21 @ 00:11), 139 (03-19 @ 07:03)  K: 3.7 (03-21 @ 00:11), 4.5 (03-19 @ 07:03)  Cl: 107 (03-21 @ 00:11), 102 (03-19 @ 07:03)  CO2: 19 (03-21 @ 00:11), 21 (03-19 @ 07:03)  BUN: 31 (03-21 @ 00:11), 21 (03-19 @ 07:03)  Cr: 1.00 (03-21 @ 00:11), 0.87 (03-19 @ 07:03)  Glu: 165(03-21 @ 00:11), 176(03-19 @ 07:03)    Hgb: 12.2 (03-21 @ 00:11), 13.8 (03-19 @ 12:20)  Hct: 37.1 (03-21 @ 00:11), 42.3 (03-19 @ 12:20)  WBC: 10.1 (03-21 @ 00:11), 8.05 (03-19 @ 12:20)  Plt: 160 (03-21 @ 00:11), 200 (03-19 @ 12:20)    INR: 1.10 03-19-19 @ 17:27  PTT: 32.1 03-19-19 @ 17:27        ASSESSMENT/PLAN: traumatic subdural hematomas s/p L craniotomy and R ja hole for SDH evacuation    NEURO:  q1h neuro checks  - CT head in AM - noted  - Monitor surgical drain output  - leakage around ja hole, monitor for any signs of infection   - No documented hx of seizures,  Keppra to 500 BID (for 7 day total)    CARDS:  -160  ?Hx of syncopal episodes -  has a defibrillator for hypertrophic Cardiomyopathy, will consult cardiology in am     PULM: RA     RENAL:  IVL    GASTRO:  advance as tolerated  Bowel regimen  ---> Stress ulcer prophylaxis: Not indicated    HEME:  monitor H/H    ---> DVT prophylaxis: SCDs, hold anticoagulation since fresh post-op    ENDO:  euglycemia    ID: [email or web address]  Monitor for fevers    Code status:  Full code  Disposition:  ICU    This patient was at high risk of neurologic deterioration and/or death due to: re-accumulation of SDH, Seizures    Time spent:  30 minutes O: agitated     T(C): 36.8 (03-21-19 @ 15:00), Max: 37.6 (03-20-19 @ 23:00)  HR: 64 (03-21-19 @ 18:00) (60 - 95)  BP: 145/73 (03-21-19 @ 18:00) (98/55 - 165/75)  RR: 15 (03-21-19 @ 18:00) (11 - 21)  SpO2: 98% (03-21-19 @ 18:00) (95% - 100%)  03-20-19 @ 07:01  -  03-21-19 @ 07:00  --------------------------------------------------------  IN: 1340 mL / OUT: 665 mL / NET: 675 mL    03-21-19 @ 07:01  -  03-21-19 @ 19:21  --------------------------------------------------------  IN: 1355 mL / OUT: 315 mL / NET: 1040 mL    chlorhexidine 4% Liquid 1 Application(s) Topical <User Schedule>  dextrose 40% Gel 15 Gram(s) Oral once PRN  dextrose 5%. 1000 milliLiter(s) IV Continuous <Continuous>  dextrose 50% Injectable 12.5 Gram(s) IV Push once  dextrose 50% Injectable 25 Gram(s) IV Push once  dextrose 50% Injectable 25 Gram(s) IV Push once  glucagon  Injectable 1 milliGRAM(s) IntraMuscular once PRN  insulin lispro (HumaLOG) corrective regimen sliding scale   SubCutaneous Before meals and at bedtime  levETIRAcetam 500 milliGRAM(s) Oral two times a day  ondansetron Injectable 4 milliGRAM(s) IV Push every 6 hours PRN    Culture - Tissue with Gram Stain (collected 03-21-19 @ 04:03)  Source: .Tissue Other, right subdural membrane  Gram Stain (03-21-19 @ 06:55):    No polymorphonuclear cells seen per low power field    No organisms seen per oil power field    Culture - Fungal, Tissue (collected 03-21-19 @ 04:03)  Source: .Tissue right subdural membrane  Preliminary Report (03-21-19 @ 12:00):    Testing in progress    Culture - Acid Fast - Tissue w/Smear (collected 03-21-19 @ 04:03)  Source: .Tissue Other, right subdural membrane      EXAMINATION:   General: Well developed male in NAD  HEENT: PERRL. EOMI.  Neuro: A&O x3. CN 2-12 grossly intact. b/l UE 5/5. B/l LE - 5/5.  Cardiovascular: RRR.  Resp: CTAB. No wheezes/rales/rhonci.  GI: Soft, NT, ND.  Extremities: No edema      LABS:  Na: 141 (03-21 @ 00:11), 139 (03-19 @ 07:03)  K: 3.7 (03-21 @ 00:11), 4.5 (03-19 @ 07:03)  Cl: 107 (03-21 @ 00:11), 102 (03-19 @ 07:03)  CO2: 19 (03-21 @ 00:11), 21 (03-19 @ 07:03)  BUN: 31 (03-21 @ 00:11), 21 (03-19 @ 07:03)  Cr: 1.00 (03-21 @ 00:11), 0.87 (03-19 @ 07:03)  Glu: 165(03-21 @ 00:11), 176(03-19 @ 07:03)    Hgb: 12.2 (03-21 @ 00:11), 13.8 (03-19 @ 12:20)  Hct: 37.1 (03-21 @ 00:11), 42.3 (03-19 @ 12:20)  WBC: 10.1 (03-21 @ 00:11), 8.05 (03-19 @ 12:20)  Plt: 160 (03-21 @ 00:11), 200 (03-19 @ 12:20)    INR: 1.10 03-19-19 @ 17:27  PTT: 32.1 03-19-19 @ 17:27        ASSESSMENT/PLAN: traumatic subdural hematomas s/p L craniotomy and R ja hole for SDH evacuation    NEURO:  q1h neuro checks  - CT head in AM - noted  - Monitor surgical drain output  - was on ativan at home 1 mg daily   - leakage around ja hole, monitor for any signs of infection   - No documented hx of seizures,  Keppra to 500 BID (for 7 day total)    CARDS:  -160  ?Hx of syncopal episodes -  has a defibrillator for hypertrophic Cardiomyopathy, will consult cardiology in am     PULM: RA     RENAL:  IVL    GASTRO:  advance as tolerated  Bowel regimen  ---> Stress ulcer prophylaxis: Not indicated    HEME:  monitor H/H    ---> DVT prophylaxis: SCDs, hold anticoagulation since fresh post-op    ENDO:  euglycemia    ID: [email or web address] abx  Monitor for fevers    Code status:  Full code  Disposition:  ICU    This patient was at high risk of neurologic deterioration and/or death due to: re-accumulation of SDH, Seizures    Time spent:  30 minutes O: agitated     T(C): 36.8 (03-21-19 @ 15:00), Max: 37.6 (03-20-19 @ 23:00)  HR: 64 (03-21-19 @ 18:00) (60 - 95)  BP: 145/73 (03-21-19 @ 18:00) (98/55 - 165/75)  RR: 15 (03-21-19 @ 18:00) (11 - 21)  SpO2: 98% (03-21-19 @ 18:00) (95% - 100%)  03-20-19 @ 07:01  -  03-21-19 @ 07:00  --------------------------------------------------------  IN: 1340 mL / OUT: 665 mL / NET: 675 mL    03-21-19 @ 07:01  -  03-21-19 @ 19:21  --------------------------------------------------------  IN: 1355 mL / OUT: 315 mL / NET: 1040 mL    chlorhexidine 4% Liquid 1 Application(s) Topical <User Schedule>  dextrose 40% Gel 15 Gram(s) Oral once PRN  dextrose 5%. 1000 milliLiter(s) IV Continuous <Continuous>  dextrose 50% Injectable 12.5 Gram(s) IV Push once  dextrose 50% Injectable 25 Gram(s) IV Push once  dextrose 50% Injectable 25 Gram(s) IV Push once  glucagon  Injectable 1 milliGRAM(s) IntraMuscular once PRN  insulin lispro (HumaLOG) corrective regimen sliding scale   SubCutaneous Before meals and at bedtime  levETIRAcetam 500 milliGRAM(s) Oral two times a day  ondansetron Injectable 4 milliGRAM(s) IV Push every 6 hours PRN    Culture - Tissue with Gram Stain (collected 03-21-19 @ 04:03)  Source: .Tissue Other, right subdural membrane  Gram Stain (03-21-19 @ 06:55):    No polymorphonuclear cells seen per low power field    No organisms seen per oil power field    Culture - Fungal, Tissue (collected 03-21-19 @ 04:03)  Source: .Tissue right subdural membrane  Preliminary Report (03-21-19 @ 12:00):    Testing in progress    Culture - Acid Fast - Tissue w/Smear (collected 03-21-19 @ 04:03)  Source: .Tissue Other, right subdural membrane      EXAMINATION:   General: Well developed male in NAD  HEENT: PERRL. EOMI.  Neuro: A&O x3. CN 2-12 grossly intact. b/l UE 5/5. B/l LE - 5/5.  Cardiovascular: RRR.  Resp: CTAB. No wheezes/rales/rhonci.  GI: Soft, NT, ND.  Extremities: No edema      LABS:  Na: 141 (03-21 @ 00:11), 139 (03-19 @ 07:03)  K: 3.7 (03-21 @ 00:11), 4.5 (03-19 @ 07:03)  Cl: 107 (03-21 @ 00:11), 102 (03-19 @ 07:03)  CO2: 19 (03-21 @ 00:11), 21 (03-19 @ 07:03)  BUN: 31 (03-21 @ 00:11), 21 (03-19 @ 07:03)  Cr: 1.00 (03-21 @ 00:11), 0.87 (03-19 @ 07:03)  Glu: 165(03-21 @ 00:11), 176(03-19 @ 07:03)    Hgb: 12.2 (03-21 @ 00:11), 13.8 (03-19 @ 12:20)  Hct: 37.1 (03-21 @ 00:11), 42.3 (03-19 @ 12:20)  WBC: 10.1 (03-21 @ 00:11), 8.05 (03-19 @ 12:20)  Plt: 160 (03-21 @ 00:11), 200 (03-19 @ 12:20)    INR: 1.10 03-19-19 @ 17:27  PTT: 32.1 03-19-19 @ 17:27      EXAM:  CT BRAIN                            PROCEDURE DATE:  03/21/2019            INTERPRETATION:  History: Follow-up postop changes.    Multiple axial sections were performed base of skull to vertex without   contrast enhancement. Coronal and sagittal reconstructions were performed   as well    Postop changes compatible with a high right frontal craniotomy and high   left frontal ja hole again seen. Subdural drains are again seen. There   is residual subdural hematoma and associated air throughout both cerebral   hemispheres. This finding measures approximately 1.9 cm on the right side   and 1.2 cm on left side.    There is slight reexpansion of the lateral ventricles seen when compared   with the prior exam.    Evaluation of the osseousstructures with the appropriate window appears   unremarkable    Right maxillary polyp is retention cyst is seen.    Both mastoid and middle ear regions appear clear.    Impression: Postop changes are again seen with residual air and subdural   hematoma again seen bilaterally.    There is some reexpansion of the lateral ventricles seen.      ASSESSMENT/PLAN: traumatic subdural hematomas s/p L craniotomy and R ja hole for SDH evacuation    NEURO:  q1h neuro checks  - CT head in AM stable  - Monitor surgical drain output 2 ALPHONSE drains   - was on ativan at home 1 mg daily   - leakage around ja hole, monitor for any signs of infection   - No documented hx of seizures,  Keppra to 500 BID, will change it to vimpat 100 mg BID due to anxiety (for 7 day total)    CARDS:  -160  ?Hx of syncopal episodes -  has a defibrillator for hypertrophic Cardiomyopathy, will consult cardiology in am   resume sotalol, he is tachycardic     PULM: RA     RENAL:  IVL    GASTRO:  advance as tolerated  Bowel regimen  ---> Stress ulcer prophylaxis: Not indicated    HEME:  monitor H/H    ---> DVT prophylaxis: SCDs, start lovenox tomorrow     ENDO:  euglycemia    ID: Renee-op abx  Monitor for fevers    Code status:  Full code  Disposition:  ICU    This patient was at high risk of neurologic deterioration and/or death due to: re-accumulation of SDH, Seizures    Time spent:  30 minutes

## 2019-03-21 NOTE — PROGRESS NOTE ADULT - ASSESSMENT
ASSESSMENT/PLAN: traumatic subdural hematomas s/p L craniotomy and R ja hole for SDH evacuation    NEURO:  q1h neuro checks  - CT head in AM  - Monitor surgical drain output  - On Keppra 1000 iv BID    CARDS:  -160    PULM: Extubated overnight    RENAL:  On NS w/ KCl @ 75 ml/hr    GASTRO:  advance as tolerated  ---> Stress ulcer prophylaxis:  PPI    HEME:  monitor H/H    ---> DVT prophylaxis: SCDs, hold anticoagulation since fresh post-op    ENDO:  euglycemia    ID: Renee-op abx  Monitor for fevers    Code status:  Full code  Disposition:  ICU    This patient was at high risk of neurologic deterioration and/or death due to: re-accumulation of SDH, Seizures    Time spent:  45 minutes ASSESSMENT/PLAN: traumatic subdural hematomas s/p L craniotomy and R ja hole for SDH evacuation    NEURO:  q1h neuro checks  - CT head in AM - noted  - Monitor surgical drain output  - No documented hx of seizures, will decrease Keppra to 500 BID (for 7 day total)    CARDS:  -160  ?Hx of syncopal episodes - will look into prev workup    PULM: Extubated overnight    RENAL:  IVL    GASTRO:  advance as tolerated  Bowel regimen  ---> Stress ulcer prophylaxis: Not indicated    HEME:  monitor H/H    ---> DVT prophylaxis: SCDs, hold anticoagulation since fresh post-op    ENDO:  euglycemia    ID: Renee-op abx  Monitor for fevers    Code status:  Full code  Disposition:  ICU    This patient was at high risk of neurologic deterioration and/or death due to: re-accumulation of SDH, Seizures    Time spent:  45 minutes

## 2019-03-22 ENCOUNTER — INBOUND DOCUMENT (OUTPATIENT)
Age: 79
End: 2019-03-22

## 2019-03-22 LAB
ANION GAP SERPL CALC-SCNC: 12 MMOL/L — SIGNIFICANT CHANGE UP (ref 5–17)
BUN SERPL-MCNC: 21 MG/DL — SIGNIFICANT CHANGE UP (ref 7–23)
CALCIUM SERPL-MCNC: 9.1 MG/DL — SIGNIFICANT CHANGE UP (ref 8.4–10.5)
CHLORIDE SERPL-SCNC: 102 MMOL/L — SIGNIFICANT CHANGE UP (ref 96–108)
CO2 SERPL-SCNC: 24 MMOL/L — SIGNIFICANT CHANGE UP (ref 22–31)
CREAT SERPL-MCNC: 0.94 MG/DL — SIGNIFICANT CHANGE UP (ref 0.5–1.3)
GLUCOSE SERPL-MCNC: 218 MG/DL — HIGH (ref 70–99)
HCT VFR BLD CALC: 40.1 % — SIGNIFICANT CHANGE UP (ref 39–50)
HGB BLD-MCNC: 12.4 G/DL — LOW (ref 13–17)
MAGNESIUM SERPL-MCNC: 2 MG/DL — SIGNIFICANT CHANGE UP (ref 1.6–2.6)
MCHC RBC-ENTMCNC: 29.6 PG — SIGNIFICANT CHANGE UP (ref 27–34)
MCHC RBC-ENTMCNC: 31 GM/DL — LOW (ref 32–36)
MCV RBC AUTO: 95.3 FL — SIGNIFICANT CHANGE UP (ref 80–100)
PHOSPHATE SERPL-MCNC: 3.1 MG/DL — SIGNIFICANT CHANGE UP (ref 2.5–4.5)
PLATELET # BLD AUTO: 154 K/UL — SIGNIFICANT CHANGE UP (ref 150–400)
POTASSIUM SERPL-MCNC: 4 MMOL/L — SIGNIFICANT CHANGE UP (ref 3.5–5.3)
POTASSIUM SERPL-SCNC: 4 MMOL/L — SIGNIFICANT CHANGE UP (ref 3.5–5.3)
RBC # BLD: 4.2 M/UL — SIGNIFICANT CHANGE UP (ref 4.2–5.8)
RBC # FLD: 12.3 % — SIGNIFICANT CHANGE UP (ref 10.3–14.5)
SODIUM SERPL-SCNC: 138 MMOL/L — SIGNIFICANT CHANGE UP (ref 135–145)
WBC # BLD: 6.2 K/UL — SIGNIFICANT CHANGE UP (ref 3.8–10.5)
WBC # FLD AUTO: 6.2 K/UL — SIGNIFICANT CHANGE UP (ref 3.8–10.5)

## 2019-03-22 PROCEDURE — 70450 CT HEAD/BRAIN W/O DYE: CPT | Mod: 26

## 2019-03-22 PROCEDURE — 99222 1ST HOSP IP/OBS MODERATE 55: CPT | Mod: GC

## 2019-03-22 PROCEDURE — 93010 ELECTROCARDIOGRAM REPORT: CPT

## 2019-03-22 PROCEDURE — 99233 SBSQ HOSP IP/OBS HIGH 50: CPT

## 2019-03-22 RX ORDER — OXYCODONE HYDROCHLORIDE 5 MG/1
10 TABLET ORAL EVERY 4 HOURS
Qty: 0 | Refills: 0 | Status: DISCONTINUED | OUTPATIENT
Start: 2019-03-22 | End: 2019-03-23

## 2019-03-22 RX ORDER — LACOSAMIDE 50 MG/1
100 TABLET ORAL
Qty: 0 | Refills: 0 | Status: DISCONTINUED | OUTPATIENT
Start: 2019-03-22 | End: 2019-03-24

## 2019-03-22 RX ORDER — OXYCODONE HYDROCHLORIDE 5 MG/1
5 TABLET ORAL EVERY 4 HOURS
Qty: 0 | Refills: 0 | Status: DISCONTINUED | OUTPATIENT
Start: 2019-03-22 | End: 2019-03-23

## 2019-03-22 RX ORDER — DOXAZOSIN MESYLATE 4 MG
1 TABLET ORAL AT BEDTIME
Qty: 0 | Refills: 0 | Status: DISCONTINUED | OUTPATIENT
Start: 2019-03-22 | End: 2019-03-25

## 2019-03-22 RX ORDER — INSULIN GLARGINE 100 [IU]/ML
8 INJECTION, SOLUTION SUBCUTANEOUS AT BEDTIME
Qty: 0 | Refills: 0 | Status: DISCONTINUED | OUTPATIENT
Start: 2019-03-22 | End: 2019-03-25

## 2019-03-22 RX ADMIN — OXYCODONE HYDROCHLORIDE 10 MILLIGRAM(S): 5 TABLET ORAL at 08:30

## 2019-03-22 RX ADMIN — OXYCODONE HYDROCHLORIDE 10 MILLIGRAM(S): 5 TABLET ORAL at 08:00

## 2019-03-22 RX ADMIN — Medication 120 MILLIGRAM(S): at 05:29

## 2019-03-22 RX ADMIN — Medication 4: at 13:04

## 2019-03-22 RX ADMIN — CHLORHEXIDINE GLUCONATE 1 APPLICATION(S): 213 SOLUTION TOPICAL at 21:39

## 2019-03-22 RX ADMIN — Medication 85 MILLIMOLE(S): at 03:10

## 2019-03-22 RX ADMIN — LACOSAMIDE 100 MILLIGRAM(S): 50 TABLET ORAL at 17:00

## 2019-03-22 RX ADMIN — Medication 2: at 08:51

## 2019-03-22 RX ADMIN — INSULIN GLARGINE 8 UNIT(S): 100 INJECTION, SOLUTION SUBCUTANEOUS at 19:52

## 2019-03-22 RX ADMIN — Medication 0.5 MILLIGRAM(S): at 21:39

## 2019-03-22 RX ADMIN — LISINOPRIL 40 MILLIGRAM(S): 2.5 TABLET ORAL at 17:00

## 2019-03-22 RX ADMIN — LACOSAMIDE 100 MILLIGRAM(S): 50 TABLET ORAL at 05:28

## 2019-03-22 RX ADMIN — ENOXAPARIN SODIUM 40 MILLIGRAM(S): 100 INJECTION SUBCUTANEOUS at 17:00

## 2019-03-22 RX ADMIN — Medication 6: at 17:25

## 2019-03-22 RX ADMIN — Medication 120 MILLIGRAM(S): at 17:00

## 2019-03-22 RX ADMIN — AMLODIPINE BESYLATE 10 MILLIGRAM(S): 2.5 TABLET ORAL at 05:28

## 2019-03-22 RX ADMIN — Medication 100 MILLIGRAM(S): at 17:00

## 2019-03-22 RX ADMIN — LISINOPRIL 40 MILLIGRAM(S): 2.5 TABLET ORAL at 05:29

## 2019-03-22 RX ADMIN — Medication 4: at 22:13

## 2019-03-22 RX ADMIN — Medication 100 MILLIGRAM(S): at 05:29

## 2019-03-22 NOTE — CHART NOTE - NSCHARTNOTEFT_GEN_A_CORE
Nutrition Follow Up Note   Patient seen for: Malnutrition follow up    Pt is a 79 yo M with PMH: Yuli s/p AICD on apixaban, suffered multiple falls since 2019 with worsening gait over 2-3 week period prior to admission. Found to have R>L acute on chronic subdural hematoma with exam notable for LUE pronator drift, shuffling gait and RUE mild resting tremor. Pt s/p right mini craniotomy and left ja hole for evacuation (POD#2). Pending CT of head at time of RD visit.     Source: Comprehensive chart review, interdisciplinary medical rounds, RN, spouse    Diet: Consistent CHO with evening snack + Glucerna Shakes twice daily (+220kcal, +10g protein) + Edmar Active twice daily.     Per discussion with spouse, pt did not eat yet this AM, as he has just woken up. Per discussion with RN, pt tolerating diet well. No overt signs of chewing/swallowing intolerance noted. RN made aware of claim of spouse yesterday of hx of slight swallowing difficulty prior to admission.     GI: No BM's noted in-house at this time. On bowel regimen as ordered. Will monitor.    Nutrition Focused Physical Assessment: Unable to conduct, as pt in process of being transferred to CT of head at time of RD visit.     Daily Weight in k.7 (03-21)  % Weight Change    Pertinent Medications: MEDICATIONS  (STANDING):  amLODIPine   Tablet 10 milliGRAM(s) Oral daily  chlorhexidine 4% Liquid 1 Application(s) Topical <User Schedule>  dextrose 5%. 1000 milliLiter(s) (50 mL/Hr) IV Continuous <Continuous>  dextrose 50% Injectable 12.5 Gram(s) IV Push once  dextrose 50% Injectable 25 Gram(s) IV Push once  dextrose 50% Injectable 25 Gram(s) IV Push once  docusate sodium 100 milliGRAM(s) Oral two times a day  enoxaparin Injectable 40 milliGRAM(s) SubCutaneous <User Schedule>  insulin lispro (HumaLOG) corrective regimen sliding scale   SubCutaneous Before meals and at bedtime  lacosamide 100 milliGRAM(s) Oral two times a day  lisinopril 40 milliGRAM(s) Oral every 12 hours  LORazepam     Tablet 0.5 milliGRAM(s) Oral at bedtime  sotalol 120 milliGRAM(s) Oral two times a day    MEDICATIONS  (PRN):  dextrose 40% Gel 15 Gram(s) Oral once PRN Blood Glucose LESS THAN 70 milliGRAM(s)/deciliter  glucagon  Injectable 1 milliGRAM(s) IntraMuscular once PRN Glucose LESS THAN 70 milligrams/deciliter  ondansetron Injectable 4 milliGRAM(s) IV Push every 6 hours PRN Nausea and/or Vomiting  oxyCODONE    IR 5 milliGRAM(s) Oral every 4 hours PRN Moderate Pain (4 - 6)  oxyCODONE    IR 10 milliGRAM(s) Oral every 4 hours PRN Severe Pain (7 - 10)    Pertinent Labs:  @ 23:08: Na 138, BUN 23, Cr 0.86, <H>, K+ 4.6, Phos 1.9<L>, Mg 2.0, Alk Phos --, ALT/SGPT --, AST/SGOT --, HbA1c --    Finger Sticks:  POCT Blood Glucose.: 179 mg/dL ( @ 08:42)  POCT Blood Glucose.: 217 mg/dL ( @ 23:11)  POCT Blood Glucose.: 170 mg/dL ( @ 17:08)  POCT Blood Glucose.: 219 mg/dL ( @ 13:14)  POCT Blood Glucose.: 150 mg/dL ( @ 09:32)      Skin per nursing documentation: Intact, no pressure injuries noted   Edema: No edema noted    Estimated Needs:   [x] no change since previous assessment  [ ] recalculated:     Previous Nutrition Diagnosis: Malnutrition, non-severe, acute; Increased nutrient needs  Nutrition Diagnosis is: Remains appropriate, ongoing with PO diet + supplement     New Nutrition Diagnosis:  Related to:    As evidenced by:     Interventions:     Recommend  1) Advance diet as tolerated to consistent CHO diet (evening snack).   2) Honor dietary preferences as expressed accounting for diet restriction.   3) Recommend Glucerna Shakes twice daily (+220kcal, +10g protein/serving); monitor tolerance/acceptance.   4) Educated spouse on menus provided daily, encouraged utilization of same to promote maximum PO intake potential.   5) Obtain current wt, conduct full Nutrition Focused Physical Assessment as able.  6) Continue Edmar Active twice daily.       Monitoring and Evaluation:     Continue to monitor Nutritional intake, Tolerance to diet prescription, weights, labs, skin integrity    RD remains available upon request and will follow up per protocol

## 2019-03-22 NOTE — OCCUPATIONAL THERAPY INITIAL EVALUATION ADULT - ADL RETRAINING, OT EVAL
GOAL: Pt will perform LB dressing independently in 4 weeks GOAL: Pt will be independent with toileting in 4 weeks

## 2019-03-22 NOTE — PROGRESS NOTE ADULT - SUBJECTIVE AND OBJECTIVE BOX
Patient is a 78y old  Male who presents with a chief complaint of b/l SDH (19 Mar 2019 10:31)      SUBJECTIVE / OVERNIGHT EVENTS:  No new symptoms. Status post surgery and evacuation of subdural hematomas 2 days ago.  Seen in Nsurgical ICU.  Review of Systems:   CONSTITUTIONAL: No fever, weight loss, or fatigue  EYES: No eye pain, visual disturbances, or discharge  ENMT:  No difficulty hearing, tinnitus, vertigo; No sinus or throat pain  NECK: No pain or stiffness  BREASTS: No pain, masses, or nipple discharge  RESPIRATORY: No cough, wheezing, chills or hemoptysis; No shortness of breath  CARDIOVASCULAR: No chest pain, palpitations, dizziness, or leg swelling  GASTROINTESTINAL: No abdominal or epigastric pain. No nausea, vomiting, or hematemesis; No diarrhea or constipation. No melena or hematochezia.  GENITOURINARY: No dysuria, frequency, hematuria, or incontinence  NEUROLOGICAL: No headaches, memory loss, loss of strength, numbness, or tremors  SKIN: No itching, burning, rashes, or lesions   LYMPH NODES: No enlarged glands  ENDOCRINE: No heat or cold intolerance; No hair loss  MUSCULOSKELETAL: No joint pain or swelling; No muscle, back, or extremity pain  PSYCHIATRIC: No depression, anxiety, mood swings, or difficulty sleeping  HEME/LYMPH: No easy bruising, or bleeding gums  ALLERY AND IMMUNOLOGIC: No hives or eczema    MEDICATIONS  (STANDING):  amLODIPine   Tablet 10 milliGRAM(s) Oral daily  dextrose 5%. 1000 milliLiter(s) (50 mL/Hr) IV Continuous <Continuous>  dextrose 50% Injectable 12.5 Gram(s) IV Push once  dextrose 50% Injectable 25 Gram(s) IV Push once  dextrose 50% Injectable 25 Gram(s) IV Push once  docusate sodium 100 milliGRAM(s) Oral three times a day  insulin lispro (HumaLOG) corrective regimen sliding scale   SubCutaneous three times a day before meals  insulin lispro (HumaLOG) corrective regimen sliding scale   SubCutaneous at bedtime  levETIRAcetam 500 milliGRAM(s) Oral every 12 hours  lisinopril 40 milliGRAM(s) Oral daily  senna 2 Tablet(s) Oral at bedtime  sodium chloride 0.9% lock flush 3 milliLiter(s) IV Push every 8 hours  sotalol 120 milliGRAM(s) Oral two times a day    MEDICATIONS  (PRN):  acetaminophen   Tablet .. 650 milliGRAM(s) Oral every 6 hours PRN Temp greater or equal to 38C (100.4F), Mild Pain (1 - 3)  bisacodyl 5 milliGRAM(s) Oral every 12 hours PRN Constipation  dextrose 40% Gel 15 Gram(s) Oral once PRN Blood Glucose LESS THAN 70 milliGRAM(s)/deciliter  diphenhydrAMINE 25 milliGRAM(s) Oral every 4 hours PRN Rash and/or Itching  glucagon  Injectable 1 milliGRAM(s) IntraMuscular once PRN Glucose LESS THAN 70 milligrams/deciliter  LORazepam     Tablet 1 milliGRAM(s) Oral at bedtime PRN Anxiety  polyethylene glycol 3350 17 Gram(s) Oral every 24 hours PRN Constipation      PHYSICAL EXAM:  Vital Signs Last 24 Hrs  T(C): 37.2 (19 Mar 2019 20:34), Max: 37.2 (19 Mar 2019 20:34)  T(F): 99 (19 Mar 2019 20:34), Max: 99 (19 Mar 2019 20:34)  HR: 61 (19 Mar 2019 20:34) (61 - 65)  BP: 131/68 (19 Mar 2019 20:34) (121/65 - 167/78)  BP(mean): --  RR: 18 (19 Mar 2019 20:34) (18 - 19)  SpO2: 97% (19 Mar 2019 20:34) (96% - 98%)  I&O's Summary    18 Mar 2019 07:01  -  19 Mar 2019 07:00  --------------------------------------------------------  IN: 460 mL / OUT: 0 mL / NET: 460 mL      GENERAL: NAD, well-developed  HEAD:  Atraumatic, Normocephalic  EYES: EOMI, PERRLA, conjunctiva and sclera clear  NECK: Supple, No JVD  CHEST/LUNG: Clear to auscultation bilaterally; No wheeze  HEART: Regular rate and rhythm; No murmurs, rubs, or gallops  ABDOMEN: Soft, Nontender, Nondistended; Bowel sounds present  EXTREMITIES:  2+ Peripheral Pulses, No clubbing, cyanosis, or edema  PSYCH: AAOx3  NEUROLOGY: Mild weakness on the right side.  SKIN: No rashes or lesions    LABS:  CAPILLARY BLOOD GLUCOSE      POCT Blood Glucose.: 166 mg/dL (19 Mar 2019 21:23)  POCT Blood Glucose.: 142 mg/dL (19 Mar 2019 17:27)  POCT Blood Glucose.: 123 mg/dL (19 Mar 2019 12:31)  POCT Blood Glucose.: 155 mg/dL (19 Mar 2019 08:02)                          13.8   8.05  )-----------( 200      ( 19 Mar 2019 12:20 )             42.3     03-19    139  |  102  |  21  ----------------------------<  176<H>  4.5   |  21<L>  |  0.87    Ca    10.0      19 Mar 2019 07:03      PT/INR - ( 19 Mar 2019 17:27 )   PT: 12.6 sec;   INR: 1.10 ratio         PTT - ( 19 Mar 2019 17:27 )  PTT:32.1 sec  CARDIAC MARKERS ( 18 Mar 2019 13:41 )  x     / x     / 82 U/L / x     / x              RADIOLOGY & ADDITIONAL TESTS:    Imaging Personally Reviewed:    Consultant(s) Notes Reviewed:      Care Discussed with Consultants/Other Providers:

## 2019-03-22 NOTE — CHART NOTE - NSCHARTNOTEFT_GEN_A_CORE
BETH [id number]      Drain type: [2]SD b/l    Patient's position while drain removed: supine    [x] Patient tolerated well [x] No complications [] complications:     Exit Site secured with: staples     Additional Info:

## 2019-03-22 NOTE — OCCUPATIONAL THERAPY INITIAL EVALUATION ADULT - ADDITIONAL COMMENTS
XR R wrist 3/17 Nondisplaced fracture in the lateral aspect of the distal right radius.  XR Pelvis (-) VA duplex BLE 3/18 (-)  CT Head 3/17 Bilateral acute on chronic subdural hematomas, right greater than left, with associated mass effect and compression of the bilateral lateral and third ventricles. No midline shift.  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  3/20 s/p right mini craniotomy and left Benezett hole for evacuation.

## 2019-03-22 NOTE — OCCUPATIONAL THERAPY INITIAL EVALUATION ADULT - TRANSFER TRAINING, PT EVAL
GOAL: Patient will perform sit to stand independently within 4 weeks. GOAL: Patient will be independent with functional transfer  in 4 weeks

## 2019-03-22 NOTE — OCCUPATIONAL THERAPY INITIAL EVALUATION ADULT - PERTINENT HX OF CURRENT PROBLEM, REHAB EVAL
78M pmhx afib s/p AICD on eliquis s/p multiple falls since January of this year with worsening gait over 2-3 weeks found to have R>L B/l acute on chronic SDH. On exam LUE pronator drift, shuffling gait, RUE mild resting tremor, otherwise Intact.

## 2019-03-22 NOTE — PROGRESS NOTE ADULT - SUBJECTIVE AND OBJECTIVE BOX
SUMMARY:  78 year-old man with atrial fibrillation s/p AICD on apixaban suffered multiple falls since January 2019 with worsening gait over the 2-3 week period prior to admission who was found to have R>L acute on chronic subdural hematoma with exam notable for LUE pronator drift, shuffling gait and RUE mild resting tremor. On 3/20/19, he underwent right mini craniotomy and left [place] hole for evacuation.    24 HOUR EVENTS:  3/21: Extubated overnight.     OVERNIGHT EVENTS: Surgical drains d/c'ed this AM.     VITALS/DATA/ORDERS: [x] Reviewed    EXAMINATION:   General: Well developed male in NAD  HEENT: PERRL. EOMI.  Neuro: A&O x3. CN 2-12 grossly intact. b/l UE 5/5. B/l LE - 5/5.  L arm noted to be in cast.   Cardiovascular: RRR.  Resp: CTAB. No wheezes/rales/rhonci.  GI: Soft, NT, ND.  Extremities: No edema SUMMARY:  78 year-old man with atrial fibrillation s/p AICD on apixaban suffered multiple falls since January 2019 with worsening gait over the 2-3 week period prior to admission who was found to have R>L acute on chronic subdural hematoma with exam notable for LUE pronator drift, shuffling gait and RUE mild resting tremor. On 3/20/19, he underwent right mini craniotomy and left [place] hole for evacuation.    24 HOUR EVENTS:  3/21: Extubated overnight.     OVERNIGHT EVENTS: Keppra switched to Vimpat overnight. Surgical drains d/c'ed this AM.     VITALS/DATA/ORDERS: [x] Reviewed    EXAMINATION:   General: Well developed male in NAD  HEENT: PERRL. EOMI.  Neuro: A&O x3. CN 2-12 grossly intact. b/l UE 5/5. B/l LE - 5/5.  L arm noted to be in cast.   Cardiovascular: RRR.  Resp: CTAB. No wheezes/rales/rhonci.  GI: Soft, NT, ND.  Extremities: No edema

## 2019-03-22 NOTE — CONSULT NOTE ADULT - SUBJECTIVE AND OBJECTIVE BOX
Patient is a 78y old  Male who presents with a chief complaint of b/l SDH (22 Mar 2019 00:08)      HPI:  78 year-old Man with a  pmhx afib s/p AICD on eliquis s/p multiple falls since January of this year with worsening gait over 2-3 weeks found to have R>L B/l acute on chronic SDH. On exam LUE pronator drift, shuffling gait, RUE mild resting tremor. Admitted to [facility] On 3/20/19, he underwent right mini craniotomy and left [place] hole for evacuation s/p EVD and removal. Followed by Medicine for HTN/DM management.        REVIEW OF SYSTEMS  [X] Constitutional WNL       [X] HEENT   [X] Cardio WNL              [X] Resp WNL            [X] GI WNL                            [X]  WNL                               [X] MSK WNL            [X] Neuro WNL                     [X] Cognitive WNL   [X] Psych WNL  [X] Skin WNL      [X] Heme WNL              [X] Endo WNL    PAST MEDICAL & SURGICAL HISTORY  Urinary urgency  Urinary frequency  HLD (hyperlipidemia)  Overactive bladder  HTN (hypertension)  Type 2 diabetes mellitus  H/O colonoscopy with polypectomy  History of prostate surgery  S/P TURP (status post transurethral resection of prostate)  H/O left inguinal hernia repair      SOCIAL HISTORY  Smoking - Denied  EtOH - Denied   Drugs - Denied    FUNCTIONAL HISTORY  PTA pt lived in pvt home with wife, 4 steps to enter +HR, flight to bedroom +HR, pt was independent however was developing inc difficulty with ambulation, using his wife's RW at times, hx falls.     Independent prior with Ambulation and ADLs.     CURRENT FUNCTIONAL STATUS  - Bed Mobility: Min to Mod A   - Transfers: Mod A  - Gait: Min A 15 feet   - ADLs: eval pending     ALLERGIES  No Known Allergies        FAMILY HISTORY   Family history of colon cancer      VITALS  T(C): 36.6 (03-22-19 @ 07:00), Max: 36.8 (03-21-19 @ 11:00)  HR: 60 (03-22-19 @ 09:00) (60 - 74)  BP: 137/68 (03-22-19 @ 09:00) (128/68 - 166/68)  RR: 20 (03-22-19 @ 09:00) (11 - 23)  SpO2: 96% (03-22-19 @ 09:00) (85% - 100%)  Wt(kg): --    RECENT LABS/IMAGING  CBC Full  -  ( 22 Mar 2019 00:18 )  WBC Count : 6.2 K/uL  Hemoglobin : 12.4 g/dL  Hematocrit : 40.1 %  Platelet Count - Automated : 154 K/uL  Mean Cell Volume : 95.3 fl  Mean Cell Hemoglobin : 29.6 pg  Mean Cell Hemoglobin Concentration : 31.0 gm/dL  Auto Neutrophil # : x  Auto Lymphocyte # : x  Auto Monocyte # : x  Auto Eosinophil # : x  Auto Basophil # : x  Auto Neutrophil % : x  Auto Lymphocyte % : x  Auto Monocyte % : x  Auto Eosinophil % : x  Auto Basophil % : x    03-21    138  |  105  |  23  ----------------------------<  230<H>  4.6   |  21<L>  |  0.86    Ca    8.6      21 Mar 2019 23:08  Phos  1.9     03-21  Mg     2.0     03-21            MEDICATIONS   amLODIPine   Tablet 10 milliGRAM(s) Oral daily  chlorhexidine 4% Liquid 1 Application(s) Topical <User Schedule>  dextrose 40% Gel 15 Gram(s) Oral once PRN  dextrose 5%. 1000 milliLiter(s) IV Continuous <Continuous>  dextrose 50% Injectable 12.5 Gram(s) IV Push once  dextrose 50% Injectable 25 Gram(s) IV Push once  dextrose 50% Injectable 25 Gram(s) IV Push once  docusate sodium 100 milliGRAM(s) Oral two times a day  enoxaparin Injectable 40 milliGRAM(s) SubCutaneous <User Schedule>  glucagon  Injectable 1 milliGRAM(s) IntraMuscular once PRN  insulin lispro (HumaLOG) corrective regimen sliding scale   SubCutaneous Before meals and at bedtime  lacosamide 100 milliGRAM(s) Oral two times a day  lisinopril 40 milliGRAM(s) Oral every 12 hours  LORazepam     Tablet 0.5 milliGRAM(s) Oral at bedtime  ondansetron Injectable 4 milliGRAM(s) IV Push every 6 hours PRN  oxyCODONE    IR 5 milliGRAM(s) Oral every 4 hours PRN  oxyCODONE    IR 10 milliGRAM(s) Oral every 4 hours PRN  sotalol 120 milliGRAM(s) Oral two times a day      ---------------------------------------------------------------------------------------- Patient is a 78y old  Male who presents with a chief complaint of b/l SDH (22 Mar 2019 00:08)      HPI:  78 year-old Man with a  pmhx afib s/p AICD on eliquis s/p multiple falls since January of this year with worsening gait over 2-3 weeks found to have R>L B/l acute on chronic SDH. On exam LUE pronator drift, shuffling gait, RUE mild resting tremor. Admitted to [facility] On 3/20/19, he underwent right mini craniotomy and left [place] hole for evacuation s/p EVD and removal. Followed by Medicine for HTN/DM management.        REVIEW OF SYSTEMS  [X] Constitutional WNL       [X] HEENT   [X] Cardio WNL              [X] Resp WNL            [X] GI + constipation at baseline                            [X]  +urinary frequency at baseline   [X] MSK +muscle aches            [X] Neuro WNL                     [X] Cognitive WNL   [X] Psych WNL  [X] Skin +staples           PAST MEDICAL & SURGICAL HISTORY  Urinary urgency  Urinary frequency  HLD (hyperlipidemia)  Overactive bladder  HTN (hypertension)  Type 2 diabetes mellitus  H/O colonoscopy with polypectomy  History of prostate surgery  S/P TURP (status post transurethral resection of prostate)  H/O left inguinal hernia repair          FUNCTIONAL HISTORY  PTA pt lived in pvt home with wife, 4 steps to enter +HR, flight to bedroom +HR, pt was independent however was developing inc difficulty with ambulation, using his wife's RW at times, hx falls.     Independent prior with Ambulation and ADLs.     CURRENT FUNCTIONAL STATUS  - Bed Mobility: Min to Mod A   - Transfers: Mod A  - Gait: Min A 15 feet   - ADLs: eval pending     ALLERGIES  No Known Allergies        FAMILY HISTORY   Family history of colon cancer      VITALS  T(C): 36.6 (03-22-19 @ 07:00), Max: 36.8 (03-21-19 @ 11:00)  HR: 60 (03-22-19 @ 09:00) (60 - 74)  BP: 137/68 (03-22-19 @ 09:00) (128/68 - 166/68)  RR: 20 (03-22-19 @ 09:00) (11 - 23)  SpO2: 96% (03-22-19 @ 09:00) (85% - 100%)  Wt(kg): --    RECENT LABS/IMAGING  CBC Full  -  ( 22 Mar 2019 00:18 )  WBC Count : 6.2 K/uL  Hemoglobin : 12.4 g/dL  Hematocrit : 40.1 %  Platelet Count - Automated : 154 K/uL  Mean Cell Volume : 95.3 fl  Mean Cell Hemoglobin : 29.6 pg  Mean Cell Hemoglobin Concentration : 31.0 gm/dL  Auto Neutrophil # : x  Auto Lymphocyte # : x  Auto Monocyte # : x  Auto Eosinophil # : x  Auto Basophil # : x  Auto Neutrophil % : x  Auto Lymphocyte % : x  Auto Monocyte % : x  Auto Eosinophil % : x  Auto Basophil % : x    03-21    138  |  105  |  23  ----------------------------<  230<H>  4.6   |  21<L>  |  0.86    Ca    8.6      21 Mar 2019 23:08  Phos  1.9     03-21  Mg     2.0     03-21            MEDICATIONS   amLODIPine   Tablet 10 milliGRAM(s) Oral daily  chlorhexidine 4% Liquid 1 Application(s) Topical <User Schedule>  dextrose 40% Gel 15 Gram(s) Oral once PRN  dextrose 5%. 1000 milliLiter(s) IV Continuous <Continuous>  dextrose 50% Injectable 12.5 Gram(s) IV Push once  dextrose 50% Injectable 25 Gram(s) IV Push once  dextrose 50% Injectable 25 Gram(s) IV Push once  docusate sodium 100 milliGRAM(s) Oral two times a day  enoxaparin Injectable 40 milliGRAM(s) SubCutaneous <User Schedule>  glucagon  Injectable 1 milliGRAM(s) IntraMuscular once PRN  insulin lispro (HumaLOG) corrective regimen sliding scale   SubCutaneous Before meals and at bedtime  lacosamide 100 milliGRAM(s) Oral two times a day  lisinopril 40 milliGRAM(s) Oral every 12 hours  LORazepam     Tablet 0.5 milliGRAM(s) Oral at bedtime  ondansetron Injectable 4 milliGRAM(s) IV Push every 6 hours PRN  oxyCODONE    IR 5 milliGRAM(s) Oral every 4 hours PRN  oxyCODONE    IR 10 milliGRAM(s) Oral every 4 hours PRN  sotalol 120 milliGRAM(s) Oral two times a day      ----------------------------------------------------------------------------------------  ---------  PHYSICAL EXAM  Constitutional - NAD, Comfortable  Cardio: no edema  Pulm: no respiratory distress  GI: ND  Neuro Exam:                    Cognitive - AAOx3   Communication - Fluent, slowed  Attention - impaired  Repetition- intact  Long term memory - intact   Short term memory - impaired (recalled 2/5 objects at 5 minutes)     Motor - No focal deficits     Sensory - Intact to LT  Extremities - No C/C/E, No calf tenderness  Psychiatric - Mood WNL, Affect WNL    ASSESSMENT/PLAN  78y Male PMHx of Afib on anticoagulation, found to have b/l subdural hematomas s/p evacuation and craniotomy, who is now with gait, ADLs, cognitive and functional deficits.     Recommend   - Disposition: ACUTE inpatient rehabilitation for the functional deficits consisting of 3 hours of therapy/day & 24 hour RN/daily PMR physician for comorbid medical management and has medical complexity necessitating daily physician oversight. Patient can tolerate intensive therapy consisting of PT/OT and SLP. Will continue to follow for ongoing rehab needs and recommendations.  - c/w PT for Bed Mobility, Transfers, Ambulation with AD   - c/w OT for ADLs  - Turn Q2hrs while in bed to prevent Pressure Injury and OOB to chair when possible Patient is a 78y old  Male who presents with a chief complaint of b/l SDH (22 Mar 2019 00:08)    HPI:  78 year-old Man with a  pmhx afib s/p AICD on eliquis s/p multiple falls since January of this year with worsening gait over 2-3 weeks found to have R>L B/l acute on chronic SDH. On exam LUE pronator drift, shuffling gait, RUE mild resting tremor. Admitted to [facility] On 3/20/19, he underwent right mini craniotomy and left [place] hole for evacuation s/p EVD and removal. Followed by Medicine for HTN/DM management.        REVIEW OF SYSTEMS  [X] Constitutional WNL       [X] HEENT   [X] Cardio WNL              [X] Resp WNL            [X] GI + constipation at baseline                            [X]  +urinary frequency at baseline   [X] MSK +muscle aches            [X] Neuro WNL                     [X] Cognitive WNL   [X] Psych WNL  [X] Skin +staples           PAST MEDICAL & SURGICAL HISTORY  Urinary urgency  Urinary frequency  HLD (hyperlipidemia)  Overactive bladder  HTN (hypertension)  Type 2 diabetes mellitus  H/O colonoscopy with polypectomy  History of prostate surgery  S/P TURP (status post transurethral resection of prostate)  H/O left inguinal hernia repair          FUNCTIONAL HISTORY  PTA pt lived in pvt home with wife, 4 steps to enter +HR, flight to bedroom +HR, pt was independent however was developing inc difficulty with ambulation, using his wife's RW at times, hx falls.     Independent prior with Ambulation and ADLs.     CURRENT FUNCTIONAL STATUS  - Bed Mobility: Min to Mod A   - Transfers: Mod A  - Gait: Min A 15 feet   - ADLs: eval pending     ALLERGIES  No Known Allergies        FAMILY HISTORY   Family history of colon cancer      VITALS  T(C): 36.6 (03-22-19 @ 07:00), Max: 36.8 (03-21-19 @ 11:00)  HR: 60 (03-22-19 @ 09:00) (60 - 74)  BP: 137/68 (03-22-19 @ 09:00) (128/68 - 166/68)  RR: 20 (03-22-19 @ 09:00) (11 - 23)  SpO2: 96% (03-22-19 @ 09:00) (85% - 100%)  Wt(kg): --    RECENT LABS/IMAGING  CBC Full  -  ( 22 Mar 2019 00:18 )  WBC Count : 6.2 K/uL  Hemoglobin : 12.4 g/dL  Hematocrit : 40.1 %  Platelet Count - Automated : 154 K/uL  Mean Cell Volume : 95.3 fl  Mean Cell Hemoglobin : 29.6 pg  Mean Cell Hemoglobin Concentration : 31.0 gm/dL  Auto Neutrophil # : x  Auto Lymphocyte # : x  Auto Monocyte # : x  Auto Eosinophil # : x  Auto Basophil # : x  Auto Neutrophil % : x  Auto Lymphocyte % : x  Auto Monocyte % : x  Auto Eosinophil % : x  Auto Basophil % : x    03-21    138  |  105  |  23  ----------------------------<  230<H>  4.6   |  21<L>  |  0.86    Ca    8.6      21 Mar 2019 23:08  Phos  1.9     03-21  Mg     2.0     03-21            MEDICATIONS   amLODIPine   Tablet 10 milliGRAM(s) Oral daily  chlorhexidine 4% Liquid 1 Application(s) Topical <User Schedule>  dextrose 40% Gel 15 Gram(s) Oral once PRN  dextrose 5%. 1000 milliLiter(s) IV Continuous <Continuous>  dextrose 50% Injectable 12.5 Gram(s) IV Push once  dextrose 50% Injectable 25 Gram(s) IV Push once  dextrose 50% Injectable 25 Gram(s) IV Push once  docusate sodium 100 milliGRAM(s) Oral two times a day  enoxaparin Injectable 40 milliGRAM(s) SubCutaneous <User Schedule>  glucagon  Injectable 1 milliGRAM(s) IntraMuscular once PRN  insulin lispro (HumaLOG) corrective regimen sliding scale   SubCutaneous Before meals and at bedtime  lacosamide 100 milliGRAM(s) Oral two times a day  lisinopril 40 milliGRAM(s) Oral every 12 hours  LORazepam     Tablet 0.5 milliGRAM(s) Oral at bedtime  ondansetron Injectable 4 milliGRAM(s) IV Push every 6 hours PRN  oxyCODONE    IR 5 milliGRAM(s) Oral every 4 hours PRN  oxyCODONE    IR 10 milliGRAM(s) Oral every 4 hours PRN  sotalol 120 milliGRAM(s) Oral two times a day      ----------------------------------------------------------------------------------------  ---------  PHYSICAL EXAM  Constitutional - NAD, Comfortable  Cardio: no edema  Pulm: no respiratory distress  GI: ND  Neuro Exam:                    Cognitive - AAOx3   Communication - Fluent, slowed  Attention - impaired  Repetition- intact  Long term memory - intact   Short term memory - impaired (recalled 2/5 objects at 5 minutes)     Motor - No focal deficits     Sensory - Intact to LT  Extremities - No C/C/E, No calf tenderness  Psychiatric - Mood WNL, Affect WNL    ASSESSMENT/PLAN  78y Male PMHx of Afib on anticoagulation, found to have b/l subdural hematomas s/p evacuation and craniotomy, who is now with gait, ADLs, cognitive and functional deficits.     Recommend   - Disposition: ACUTE inpatient rehabilitation for the functional deficits consisting of 3 hours of therapy/day & 24 hour RN/daily PMR physician for comorbid medical management and has medical complexity necessitating daily physician oversight. Patient can tolerate intensive therapy consisting of PT/OT and SLP. Will continue to follow for ongoing rehab needs and recommendations.  - c/w PT for Bed Mobility, Transfers, Ambulation with AD   - c/w OT for ADLs  - Turn Q2hrs while in bed to prevent Pressure Injury and OOB to chair when possible

## 2019-03-22 NOTE — PROGRESS NOTE ADULT - ASSESSMENT
ASSESSMENT/PLAN: traumatic subdural hematomas s/p L craniotomy and R ja hole for SDH evacuation    NEURO:  q1h neuro checks  - CT head in AM - noted  - No documented hx of seizures, will decrease Keppra to 500 BID (for 7 day total)  - Pain control w/ Oxycodone prn  - Hx of anxiety - started on Ativan qHS    CARDS:  -160  Started on home med of Sotalol overnight  ?Hx of syncopal episodes - will look into prev workup    PULM: Extubated 3/21  Incentive spirometry    RENAL:  IVL    GASTRO:  Cardiac diet  Bowel regimen  ---> Stress ulcer prophylaxis: Not indicated    HEME:  monitor H/H    ---> DVT prophylaxis: SCDs, started on Lovenox sc qHS    ENDO:  euglycemia    ID: [email or web address]  Monitor for fevers    Code status:  Full code  Disposition:  ICU    This patient was at high risk of neurologic deterioration and/or death due to: re-accumulation of SDH, Seizures    Time spent:  45 minutes ASSESSMENT/PLAN: traumatic subdural hematomas s/p L craniotomy and R ja hole for SDH evacuation    NEURO:  q1h neuro checks  - CT head in AM - noted  - No documented hx of seizures  - Keppra switched to Vimpat given agitation  - Pain control w/ Oxycodone prn  - Hx of anxiety - started on Ativan qHS    CARDS:  -160  Started on home med of Sotalol overnight  ?Hx of syncopal episodes - will look into prev workup    PULM: Extubated 3/21  Incentive spirometry    RENAL:  IVL    GASTRO:  Cardiac diet  Bowel regimen  ---> Stress ulcer prophylaxis: Not indicated    HEME:  monitor H/H    ---> DVT prophylaxis: SCDs, started on Lovenox sc qHS    ENDO:  euglycemia    ID: [email or web address]  Monitor for fevers    Code status:  Full code  Disposition:  ICU    This patient was at high risk of neurologic deterioration and/or death due to: re-accumulation of SDH, Seizures    Time spent:  45 minutes ASSESSMENT/PLAN: traumatic subdural hematomas s/p L craniotomy and R ja hole for SDH evacuation    NEURO:  q1h neuro checks  - CT head in AM - noted  - No documented hx of seizures  - Keppra switched to Vimpat given agitation  - Pain control w/ Oxycodone prn  - Hx of anxiety - started on Ativan qHS    CARDS:  -160  Started on home med of Sotalol overnight  ?Hx of syncopal episodes - will look into prev workup    PULM: Extubated 3/21  Incentive spirometry    RENAL:  IVL    GASTRO:  Cardiac diet  Bowel regimen  ---> Stress ulcer prophylaxis: Not indicated    HEME:  monitor H/H    ---> DVT prophylaxis: SCDs, started on Lovenox sc qHS    ENDO:  euglycemia    ID: [email or web address]  Monitor for fevers    Code status:  Full code  Disposition:  Floors    This patient was at high risk of neurologic deterioration and/or death due to: re-accumulation of SDH, Seizures    Time spent:  45 minutes ASSESSMENT/PLAN: traumatic subdural hematomas s/p L craniotomy and R ja hole for SDH evacuation    NEURO:  q1h neuro checks  - CT head in AM - noted  - No documented hx of seizures  - Keppra switched to Vimpat given agitation  - Pain control w/ Oxycodone prn  - Hx of anxiety - started on Ativan qHS    CARDS:  -160  Started on home med of Sotalol overnight  ?Hx of syncopal episodes - will look into prev workup    PULM: Extubated 3/21  Incentive spirometry    RENAL:  IVL    GASTRO:  Cardiac diet  Bowel regimen  ---> Stress ulcer prophylaxis: Not indicated    HEME:  monitor H/H    ---> DVT prophylaxis: SCDs, started on Lovenox sc qHS    ENDO:  euglycemia    ID: [email or web address]  Monitor for fevers    Code status:  Full code  Disposition:  Floors

## 2019-03-22 NOTE — OCCUPATIONAL THERAPY INITIAL EVALUATION ADULT - AMBULATORY DEVICES NEEDED
[facility]  Hospitalist Progress Note  Name: Kelly Nunez    MRN: [id number]  Provider:  Breana Gaines MD  01/28/18    Initial presenting complaint/issue to hospital (Diagnosis): acute resp distress.         Assessment and Plan:      Summary of Stay: Kelly Nunez is a 67 year old female admitted on 1/23/2018 with acute respiratory distress. Hx of dementia, DM, likely COPD who presented with SOB.        Problem List:       1. Acute respiratory distress likely due to CAP with probable superimposed severe COPD.  - On IV rocephin and azithromycin.  - On oxygen, nebs and IV solumedrol.  - d/c macrolide, switch to PO prednisone 1/18.  - switch PO rocephin to PO ceftin.      2. PAF.  - Converted spontaneously to NSR.  - Cardiology consulted and we plan to hold OAC due to concern for falls.  - TTE showed grade 2 DD and moderate to severe pulm HTN.  - Needs event monitor on d/c.      3. Dementia with superimposed acute encephalopathy.  - Likely due to pneumonia.  - Monitor, avoid benzo's.  - On namenda.  - On zyprexa.  - Needs to be off sitter and VPM prior to TCU.      4. DM.  - On insulin.      5. HTN urgency.  - On norvasc and diltiazem.       Pt discussed with RN and daughter Brianna in rounds.      DVT Prophylaxis:  -  SQ heparin.  Code Status: Full Code  Discharge Dispo: TCU.  Estimated Disch Date / # of Days until Discharge:  1-2 days.             Interval History:        No fevers/chills. Cough is better, SOB is better.                  Physical Exam:      Last Vital Signs:  Temp: 98.2  F (36.8  C) Temp src: Oral BP: (!) 153/96   Heart Rate: 86 Resp: 18 SpO2: 92 % O2 Device: Nasal cannula Oxygen Delivery: 2 LPM    Intake/Output Summary (Last 24 hours) at 01/28/18 0745  Last data filed at 01/28/18 0600   Gross per 24 hour   Intake              740 ml   Output              400 ml   Net              340 ml     I/O last 3 completed shifts:  In: 740 [P.O.:740]  Out: 400 [Urine:400]  Vitals:     01/23/18 1512   Weight: 41.7 kg (92 lb)     Gen - Alert, awake, oriented to self, unsure of month/year. Cachetic and appears older than stated age.  Lungs - + diminished BS bilaterally.  Heart - RR,S1+S2 nml, no m/g/r.  Abd - soft, NT, ND, + BS.  Ext - no edema.           Medications:      All current medications were reviewed.         Data:      All new lab and imaging data was reviewed.   Labs:  No results for input(s): CULT in the last 168 hours.    Recent Labs  Lab 01/28/18 0718 01/26/18  0700 01/25/18  0727 01/24/18  0744   WBC  --  6.3 8.1 4.5   HGB  --  14.7 14.9 14.6   HCT  --  44.5 44.3 43.2   MCV  --  97 97 96    287 274 231       Recent Labs  Lab 01/26/18 0700 01/25/18 0727 01/24/18  0744 01/23/18  1524    138 137 130*   POTASSIUM 3.6 3.8 4.0 3.6   CHLORIDE 98 99 101 91*   CO2 37* 34* 28 28   ANIONGAP 4 5 8 11   * 157* 155* 195*   BUN 20 20 26 32*   CR 0.45* 0.46* 0.57 1.03   GFRESTIMATED >90 >90 >90 54*   GFRESTBLACK >90 >90 >90 65   ATTILA 8.5 8.3* 8.1* 8.7   MAG  --   --   --  2.5*   PROTTOTAL  --   --   --  8.2   ALBUMIN  --   --   --  3.5   BILITOTAL  --   --   --  0.5   ALKPHOS  --   --   --  110   AST  --   --   --  101*   ALT  --   --   --  70*      Recent Imaging:   No results found for this or any previous visit (from the past 24 hour(s)).   no

## 2019-03-22 NOTE — PROGRESS NOTE ADULT - SUBJECTIVE AND OBJECTIVE BOX
Vital Signs Last 24 Hrs  T(C): 36.8 (21 Mar 2019 19:00), Max: 36.8 (21 Mar 2019 03:00)  T(F): 98.3 (21 Mar 2019 19:00), Max: 98.3 (21 Mar 2019 03:00)  HR: 69 (21 Mar 2019 20:00) (60 - 95)  BP: 151/66 (21 Mar 2019 20:00) (112/59 - 154/73)  BP(mean): 91 (21 Mar 2019 20:00) (74 - 100)  RR: 14 (21 Mar 2019 20:00) (11 - 21)  SpO2: 99% (21 Mar 2019 20:00) (95% - 100%)    extubated, awake, AOX3, FC, LUE drift, b/l UE 4/5, b/l LE 5/5 Vital Signs Last 24 Hrs  T(C): 36.8 (21 Mar 2019 19:00), Max: 36.8 (21 Mar 2019 03:00)  T(F): 98.3 (21 Mar 2019 19:00), Max: 98.3 (21 Mar 2019 03:00)  HR: 69 (21 Mar 2019 20:00) (60 - 95)  BP: 151/66 (21 Mar 2019 20:00) (112/59 - 154/73)  BP(mean): 91 (21 Mar 2019 20:00) (74 - 100)  RR: 14 (21 Mar 2019 20:00) (11 - 21)  SpO2: 99% (21 Mar 2019 20:00) (95% - 100%)    awake, AOX3, FC, LUE drift, b/l UE 4/5, b/l LE 5/5

## 2019-03-22 NOTE — OCCUPATIONAL THERAPY INITIAL EVALUATION ADULT - LIVES WITH, PROFILE
Lives in house with wife 2 steps to enter +handrail, bed/bath on 2nd floor with full flight of steps and handrail/spouse

## 2019-03-22 NOTE — CONSULT NOTE ADULT - ATTENDING COMMENTS
Pt seen and examined.  Exam and plan as above
Seen and examined with resident. Agree with note.   Patient with SHD and gait dysfunction.  Patient will need acute rehabilitation when stable.

## 2019-03-22 NOTE — PROGRESS NOTE ADULT - SUBJECTIVE AND OBJECTIVE BOX
Patient seen and examined    T(C): 36.9 (03-22-19 @ 19:00), Max: 36.9 (03-22-19 @ 19:00)  HR: 60 (03-22-19 @ 19:00) (60 - 72)  BP: 136/72 (03-22-19 @ 19:00) (125/70 - 166/68)  RR: 18 (03-22-19 @ 19:00) (12 - 23)  SpO2: 100% (03-22-19 @ 19:00) (85% - 100%)  03-21-19 @ 07:01  -  03-22-19 @ 07:00  --------------------------------------------------------  IN: 2128.2 mL / OUT: 1070 mL / NET: 1058.2 mL    03-22-19 @ 07:01  -  03-22-19 @ 22:26  --------------------------------------------------------  IN: 1413.2 mL / OUT: 600 mL / NET: 813.2 mL    amLODIPine   Tablet 10 milliGRAM(s) Oral daily  chlorhexidine 4% Liquid 1 Application(s) Topical <User Schedule>  dextrose 40% Gel 15 Gram(s) Oral once PRN  dextrose 5%. 1000 milliLiter(s) IV Continuous <Continuous>  dextrose 50% Injectable 12.5 Gram(s) IV Push once  dextrose 50% Injectable 25 Gram(s) IV Push once  dextrose 50% Injectable 25 Gram(s) IV Push once  docusate sodium 100 milliGRAM(s) Oral two times a day  enoxaparin Injectable 40 milliGRAM(s) SubCutaneous <User Schedule>  glucagon  Injectable 1 milliGRAM(s) IntraMuscular once PRN  insulin glargine Injectable (LANTUS) 8 Unit(s) SubCutaneous at bedtime  insulin lispro (HumaLOG) corrective regimen sliding scale   SubCutaneous Before meals and at bedtime  lacosamide 100 milliGRAM(s) Oral two times a day  lisinopril 40 milliGRAM(s) Oral every 12 hours  LORazepam     Tablet 0.5 milliGRAM(s) Oral at bedtime  ondansetron Injectable 4 milliGRAM(s) IV Push every 6 hours PRN  oxyCODONE    IR 5 milliGRAM(s) Oral every 4 hours PRN  oxyCODONE    IR 10 milliGRAM(s) Oral every 4 hours PRN  sotalol 120 milliGRAM(s) Oral two times a day        Exam stable    labs and imaging reviewed     Continue same management     Radha Rodriguez NSCU attending

## 2019-03-23 PROCEDURE — 99233 SBSQ HOSP IP/OBS HIGH 50: CPT

## 2019-03-23 RX ORDER — OXYCODONE HYDROCHLORIDE 5 MG/1
10 TABLET ORAL EVERY 4 HOURS
Qty: 0 | Refills: 0 | Status: DISCONTINUED | OUTPATIENT
Start: 2019-03-23 | End: 2019-03-25

## 2019-03-23 RX ORDER — OXYCODONE HYDROCHLORIDE 5 MG/1
5 TABLET ORAL EVERY 4 HOURS
Qty: 0 | Refills: 0 | Status: DISCONTINUED | OUTPATIENT
Start: 2019-03-23 | End: 2019-03-25

## 2019-03-23 RX ADMIN — Medication 0.5 MILLIGRAM(S): at 21:28

## 2019-03-23 RX ADMIN — Medication 100 MILLIGRAM(S): at 17:54

## 2019-03-23 RX ADMIN — Medication 2: at 17:55

## 2019-03-23 RX ADMIN — Medication 120 MILLIGRAM(S): at 03:43

## 2019-03-23 RX ADMIN — LACOSAMIDE 100 MILLIGRAM(S): 50 TABLET ORAL at 17:54

## 2019-03-23 RX ADMIN — LISINOPRIL 40 MILLIGRAM(S): 2.5 TABLET ORAL at 17:55

## 2019-03-23 RX ADMIN — AMLODIPINE BESYLATE 10 MILLIGRAM(S): 2.5 TABLET ORAL at 06:20

## 2019-03-23 RX ADMIN — Medication 4: at 12:56

## 2019-03-23 RX ADMIN — ENOXAPARIN SODIUM 40 MILLIGRAM(S): 100 INJECTION SUBCUTANEOUS at 17:54

## 2019-03-23 RX ADMIN — Medication 4: at 21:29

## 2019-03-23 RX ADMIN — Medication 100 MILLIGRAM(S): at 06:20

## 2019-03-23 RX ADMIN — LACOSAMIDE 100 MILLIGRAM(S): 50 TABLET ORAL at 06:20

## 2019-03-23 RX ADMIN — INSULIN GLARGINE 8 UNIT(S): 100 INJECTION, SOLUTION SUBCUTANEOUS at 21:29

## 2019-03-23 RX ADMIN — Medication 120 MILLIGRAM(S): at 17:55

## 2019-03-23 RX ADMIN — Medication 1 MILLIGRAM(S): at 22:26

## 2019-03-23 RX ADMIN — LISINOPRIL 40 MILLIGRAM(S): 2.5 TABLET ORAL at 06:20

## 2019-03-23 NOTE — PROGRESS NOTE ADULT - SUBJECTIVE AND OBJECTIVE BOX
SUMMARY:  78 year-old man with atrial fibrillation s/p AICD on apixaban suffered multiple falls since January 2019 with worsening gait over the 2-3 week period prior to admission who was found to have R>L acute on chronic subdural hematoma with exam notable for LUE pronator drift, shuffling gait and RUE mild resting tremor. On 3/20/19, he underwent right mini craniotomy and left [place] hole for evacuation.    24 HOUR EVENTS:  3/21: Extubated overnight.   3/22: switched to Vimpat overnight. Surgical drains d/c'ed this AM.     OVERNIGHT EVENTS: Neurologically stable.     VITALS/DATA/ORDERS: [x] Reviewed    EXAMINATION:   General: Well developed male in NAD  HEENT: PERRL. EOMI.  Neuro: A&O x3. CN 2-12 grossly intact. b/l UE 5/5. B/l LE - 5/5.  L arm noted to be in cast.   Cardiovascular: RRR.  Resp: CTAB. No wheezes/rales/rhonci.  GI: Soft, NT, ND.  Extremities: No edema SUMMARY:  78 year-old man with atrial fibrillation s/p AICD on apixaban suffered multiple falls since January 2019 with worsening gait over the 2-3 week period prior to admission who was found to have R>L acute on chronic subdural hematoma with exam notable for LUE pronator drift, shuffling gait and RUE mild resting tremor. On 3/20/19, he underwent right mini craniotomy and left [place] hole for evacuation.    24 HOUR EVENTS:  3/21: Extubated overnight.   3/22: switched to Vimpat overnight. Surgical drains d/c'ed this AM.     OVERNIGHT EVENTS: Neurologically stable. Had an episode of 11 beats of VTach overnight. AICD did not fire per pt    VITALS/DATA/ORDERS: [x] Reviewed    EXAMINATION:   General: Well developed male in NAD  HEENT: PERRL. EOMI.  Neuro: A&O x3. CN 2-12 grossly intact. b/l UE 5/5. B/l LE - 5/5.  L arm noted to be in cast.   Cardiovascular: RRR.  Resp: CTAB. No wheezes/rales/rhonci.  GI: Soft, NT, ND.  Extremities: No edema

## 2019-03-23 NOTE — PROGRESS NOTE ADULT - ASSESSMENT
ASSESSMENT/PLAN: traumatic subdural hematomas s/p L craniotomy and R ja hole for SDH evacuation    NEURO:  q1h neuro checks  - No documented hx of seizures  - Keppra switched to Vimpat given agitation  - Pain control w/ Oxycodone prn  - Hx of anxiety - started on Ativan qHS    CARDS:  -160  Started on home med of Sotalol overnight  ?Hx of syncopal episodes - will look into prev workup    PULM: Extubated 3/21  Incentive spirometry    RENAL:  IVL    GASTRO:  Cardiac diet  Bowel regimen  ---> Stress ulcer prophylaxis: Not indicated    HEME:  monitor H/H    ---> DVT prophylaxis: SCDs, started on Lovenox sc qHS    ENDO:  euglycemia    ID: [email or web address] [id number]  Monitor for fevers    Code status:  Full code  Disposition:  [facility]ors ASSESSMENT/PLAN: traumatic subdural hematomas s/p L craniotomy and R ja hole for SDH evacuation    NEURO:  q1h neuro checks  - No documented hx of seizures  - Keppra switched to Vimpat given agitation; total AED for 7 days  - Pain control w/ Oxycodone prn  - Hx of anxiety - started on Ativan qHS    CARDS:  -160  VTAch beats - Keep K > 4, Mg > 2  Will get AICD interrogated  Started on home med of Sotalol overnight  ?Hx of syncopal episodes - per family, AICD was placed as a result of workup    PULM: Extubated 3/21  Incentive spirometry    RENAL:  IVL    GASTRO:  Cardiac diet  Bowel regimen  ---> Stress ulcer prophylaxis: Not indicated    HEME:  monitor H/H    ---> DVT prophylaxis: SCDs,  on Lovenox sc qHS    ENDO:  euglycemia    ID: [email or web address] [email or web address]  Monitor for fevers    Code status:  Full code  Disposition:  Floors

## 2019-03-23 NOTE — PROGRESS NOTE ADULT - SUBJECTIVE AND OBJECTIVE BOX
Vital Signs Last 24 Hrs  T(C): 36.8 (22 Mar 2019 23:00), Max: 36.9 (22 Mar 2019 19:00)  T(F): 98.3 (22 Mar 2019 23:00), Max: 98.5 (22 Mar 2019 19:00)  HR: 65 (22 Mar 2019 23:00) (60 - 72)  BP: 136/72 (22 Mar 2019 19:00) (125/70 - 159/74)  BP(mean): 92 (22 Mar 2019 19:00) (84 - 106)  RR: 14 (22 Mar 2019 23:00) (12 - 23)  SpO2: 97% (22 Mar 2019 23:00) (85% - 100%)    EXAM  AOX3, FC, b/l UE 5/5, b/l LE 5-/5

## 2019-03-24 LAB — TROPONIN T, HIGH SENSITIVITY RESULT: 26 NG/L — SIGNIFICANT CHANGE UP (ref 0–51)

## 2019-03-24 PROCEDURE — 93010 ELECTROCARDIOGRAM REPORT: CPT

## 2019-03-24 RX ORDER — SENNA PLUS 8.6 MG/1
2 TABLET ORAL AT BEDTIME
Qty: 0 | Refills: 0 | Status: DISCONTINUED | OUTPATIENT
Start: 2019-03-24 | End: 2019-03-25

## 2019-03-24 RX ORDER — POLYETHYLENE GLYCOL 3350 17 G/17G
17 POWDER, FOR SOLUTION ORAL EVERY 12 HOURS
Qty: 0 | Refills: 0 | Status: DISCONTINUED | OUTPATIENT
Start: 2019-03-24 | End: 2019-03-25

## 2019-03-24 RX ORDER — DOCUSATE SODIUM 100 MG
100 CAPSULE ORAL THREE TIMES A DAY
Qty: 0 | Refills: 0 | Status: DISCONTINUED | OUTPATIENT
Start: 2019-03-24 | End: 2019-03-25

## 2019-03-24 RX ADMIN — Medication 100 MILLIGRAM(S): at 21:31

## 2019-03-24 RX ADMIN — AMLODIPINE BESYLATE 10 MILLIGRAM(S): 2.5 TABLET ORAL at 05:53

## 2019-03-24 RX ADMIN — Medication 0.5 MILLIGRAM(S): at 21:30

## 2019-03-24 RX ADMIN — Medication 120 MILLIGRAM(S): at 06:01

## 2019-03-24 RX ADMIN — Medication 4: at 13:09

## 2019-03-24 RX ADMIN — Medication 2: at 17:20

## 2019-03-24 RX ADMIN — Medication 1 MILLIGRAM(S): at 21:31

## 2019-03-24 RX ADMIN — SENNA PLUS 2 TABLET(S): 8.6 TABLET ORAL at 21:31

## 2019-03-24 RX ADMIN — ENOXAPARIN SODIUM 40 MILLIGRAM(S): 100 INJECTION SUBCUTANEOUS at 17:21

## 2019-03-24 RX ADMIN — LACOSAMIDE 100 MILLIGRAM(S): 50 TABLET ORAL at 05:52

## 2019-03-24 RX ADMIN — Medication 100 MILLIGRAM(S): at 13:09

## 2019-03-24 RX ADMIN — LISINOPRIL 40 MILLIGRAM(S): 2.5 TABLET ORAL at 17:20

## 2019-03-24 RX ADMIN — Medication 2: at 21:33

## 2019-03-24 RX ADMIN — Medication 2: at 09:03

## 2019-03-24 RX ADMIN — INSULIN GLARGINE 8 UNIT(S): 100 INJECTION, SOLUTION SUBCUTANEOUS at 21:30

## 2019-03-24 RX ADMIN — Medication 120 MILLIGRAM(S): at 17:21

## 2019-03-24 RX ADMIN — LISINOPRIL 40 MILLIGRAM(S): 2.5 TABLET ORAL at 07:07

## 2019-03-24 RX ADMIN — POLYETHYLENE GLYCOL 3350 17 GRAM(S): 17 POWDER, FOR SOLUTION ORAL at 17:20

## 2019-03-24 RX ADMIN — Medication 100 MILLIGRAM(S): at 05:52

## 2019-03-24 NOTE — PROGRESS NOTE ADULT - ASSESSMENT
78M pmhx afib s/p AICD on eliquis s/p multiple falls since January of this year with worsening gait over 2-3 weeks found to have R>L B/l acute on chronic SDH. On exam LUE pronator drift, shuffling gait, RUE mild resting tremor, otherwise Intact. (17 Mar 2019 13:26)    PAST MEDICAL & SURGICAL HISTORY:  Urinary urgency  Urinary frequency  HLD (hyperlipidemia)  Overactive bladder  HTN (hypertension)  Type 2 diabetes mellitus  H/O colonoscopy with polypectomy  History of prostate surgery: s/p prostate microwave therapy  S/P TURP (status post transurethral resection of prostate)  H/O left inguinal hernia repair: with mesh 15 years ago    PROCEDURE: 3/20/19 s/p bilateral craniotomies for evacuation of SDHs                      3/17/19 s/p R distal radius fracture casted by ortho here- Dr Lanza    PLAN:  Neuro: completed 7 days seizure prophylaxis; cont increase OOB; pain meds PRN;   Respiratory: patient instructed on incentive spirometer  CV: cont off eliquis for afib given SDHs/OR; will need to confirm with neurosurgeon time frame to restart A/C  house cardiology following; appreciate EP interrogation today (see chart); cont sotalol for afib; cont lisinopril and amlodipine for HTN  Endocrine: cont insulins for type 2 DM          DVT ppx: [] SQH [x] SQL and venodynes bilaterally  Renal: IVL  ID: afebrile  GI: bowel regimen  PT/OT/PMR- acute rehab upon d/c  Dr Blair from medicine following  will speak with ortho regarding f/u and need for reimaging prior to d/c to rehab    Will discuss with Dr Xavier  [facility] # [id number]    Assessment:  Please Check When Present   []  GCS  E   V  M     Heart Failure: []Acute, [] acute on chronic , []chronic  Heart Failure:  [] Diastolic (HFpEF), [] Systolic (HFrEF), []Combined (HFpEF and HFrEF), [] RHF, [] Pulm HTN, [] Other    [] OSCAR, [] ATN, [] AIN, [] other  [] CKD1, [] CKD2, [] CKD 3, [] CKD 4, [] CKD 5, []ESRD    Encephalopathy: [] Metabolic, [] Hepatic, [] toxic, [] Neurological, [] Other    Abnormal Nutritional Status: [] malnutrition (see nutrition note), [ ]underweight: BMI < 19, [] morbid obesity: BMI >40, [] Cachexia    [] Sepsis  [] hypovolemic shock,[] cardiogenic shock, [] hemorrhagic shock, [] neurogenic shock  [] Acute Respiratory Failure  [x]Cerebral edema- due to SDH, [] Brain compression/ herniation,   [] Functional quadriplegia  [] Acute blood loss anemia

## 2019-03-24 NOTE — PROGRESS NOTE ADULT - SUBJECTIVE AND OBJECTIVE BOX
CHIEF COMPLAINT: patient without complaints, denies CP/SOB- [facility] and [facility] earlier today without symptoms- troponins/EKG sent, AICD interrogated @ bedside today; will continue tele monitoring  +tele- afib w some wide complex tachycardia    OVERNIGHT EVENTS: [facility]     Vital Signs Last 24 Hrs  T(C): 36.7 (24 Mar 2019 11:49), Max: 36.9 (23 Mar 2019 23:40)  T(F): 98.1 (24 Mar 2019 11:49), Max: 98.5 (24 Mar 2019 05:08)  HR: 73 (24 Mar 2019 11:49) (59 - 86)  BP: 126/68 (24 Mar 2019 11:49) (107/65 - 151/76)  BP(mean): 82 (23 Mar 2019 15:00) (82 - 82)  RR: 20 (24 Mar 2019 11:49) (16 - 20)  SpO2: 100% (24 Mar 2019 11:49) (94% - 100%)    PHYSICAL EXAM:    General: No Acute Distress     Neurological: Awake, alert oriented to person, place and time, Following Commands, PERRL, EOMI, Face Symmetrical, Speech Fluent, Moving all extremities, Muscle Strength normal in all four extremities, +R forearm cast, No Drift, Sensation to Light Touch Intact    Pulmonary: Clear to Auscultation, No Rales, No Rhonchi, No Wheezes     Cardiovascular: S1, S2, Regular Rate and Rhythm     Gastrointestinal: Soft, Nontender, Nondistended     Incision: +staples c/d/i    +R forearm cast (R distal radius fracture casted by ortho here 3/17/19 Dr Lanza)    LABS:   03-22    138  |  102  |  21  ----------------------------<  218<H>  4.0   |  24  |  0.94    Ca    9.1      22 Mar 2019 21:33  Phos  3.1     03-22  Mg     2.0     03-22    Hemoglobin A1C, Whole Blood: 7.5 % (03-18 @ 17:27)    03-23 @ 07:01  -  03-24 @ 07:00  --------------------------------------------------------  IN: 720 mL / OUT: 350 mL / NET: 370 mL    MEDICATIONS:  Anticoagulation:  enoxaparin Injectable 40 milliGRAM(s) SubCutaneous <User Schedule>    Endo:  dextrose 40% Gel 15 Gram(s) Oral once PRN  dextrose 50% Injectable 12.5 Gram(s) IV Push once  dextrose 50% Injectable 25 Gram(s) IV Push once  dextrose 50% Injectable 25 Gram(s) IV Push once  glucagon  Injectable 1 milliGRAM(s) IntraMuscular once PRN  insulin glargine Injectable (LANTUS) 8 Unit(s) SubCutaneous at bedtime  insulin lispro (HumaLOG) corrective regimen sliding scale   SubCutaneous Before meals and at bedtime    Neuro:  LORazepam     Tablet 0.5 milliGRAM(s) Oral at bedtime  ondansetron Injectable 4 milliGRAM(s) IV Push every 6 hours PRN Nausea and/or Vomiting  oxyCODONE    IR 5 milliGRAM(s) Oral every 4 hours PRN Moderate Pain (4 - 6)  oxyCODONE    IR 10 milliGRAM(s) Oral every 4 hours PRN Severe Pain (7 - 10)    Cardiac:  amLODIPine   Tablet 10 milliGRAM(s) Oral daily  doxazosin 1 milliGRAM(s) Oral at bedtime  lisinopril 40 milliGRAM(s) Oral every 12 hours  sotalol 120 milliGRAM(s) Oral two times a day    GI/:  bisacodyl Suppository 10 milliGRAM(s) Rectal daily PRN Constipation  docusate sodium 100 milliGRAM(s) Oral three times a day  polyethylene glycol 3350 17 Gram(s) Oral every 12 hours  senna 2 Tablet(s) Oral at bedtime    Other:   dextrose 5%. 1000 milliLiter(s) IV Continuous <Continuous>    DIET: [] Regular [x] CCD [] Renal [] Puree [] Dysphagia [] Tube Feeds:     IMAGING:   < from: CT Head No Cont (03.22.19 @ 09:39) >  INTERPRETATION:  History: Status post evacuation of subdural hematomas    Multiple axial sections were performed from base of skull to vertex   without contrast enhancement. Coronal and sagittal reconstructions were   performed as well    This exam is compared with prior noncontrast head CT performed on March 21, 2019.    Postop changes compatible with a high right frontal craniotomy and high   left frontal burrhole. Previously noted bilateral subdural drains have   been removed. There is evidence of subdural air with some residual mixed   attenuated subdural hematomas again identified. This finding measures   approximately 1.4 cm on the right side and 1.1 cm on the left side. There   is localized mass effect consisting sulcal effacement which is more   prominent on the right side.    The size and configuration of the ventricles appear unchanged.    Evaluation the osseous structures with appropriate window aside from   postop changes appear unremarkable. Extracalvarial soft tissue swelling   and staples are identified in the postop region.    Right maxillary polyp is retention cyst is seen.    Both mastoid and middle ear regions appear clear.    Impression: Previously noted bilateral subdural drains have been removed.    < end of copied text >

## 2019-03-25 ENCOUNTER — TRANSCRIPTION ENCOUNTER (OUTPATIENT)
Age: 79
End: 2019-03-25

## 2019-03-25 VITALS
TEMPERATURE: 98 F | HEART RATE: 63 BPM | RESPIRATION RATE: 18 BRPM | DIASTOLIC BLOOD PRESSURE: 72 MMHG | SYSTOLIC BLOOD PRESSURE: 157 MMHG | OXYGEN SATURATION: 96 %

## 2019-03-25 LAB
ANION GAP SERPL CALC-SCNC: 12 MMOL/L — SIGNIFICANT CHANGE UP (ref 5–17)
BASOPHILS # BLD AUTO: 0 K/UL — SIGNIFICANT CHANGE UP (ref 0–0.2)
BASOPHILS NFR BLD AUTO: 0.3 % — SIGNIFICANT CHANGE UP (ref 0–2)
BUN SERPL-MCNC: 23 MG/DL — SIGNIFICANT CHANGE UP (ref 7–23)
CALCIUM SERPL-MCNC: 9 MG/DL — SIGNIFICANT CHANGE UP (ref 8.4–10.5)
CHLORIDE SERPL-SCNC: 102 MMOL/L — SIGNIFICANT CHANGE UP (ref 96–108)
CO2 SERPL-SCNC: 23 MMOL/L — SIGNIFICANT CHANGE UP (ref 22–31)
CREAT SERPL-MCNC: 1.02 MG/DL — SIGNIFICANT CHANGE UP (ref 0.5–1.3)
EOSINOPHIL # BLD AUTO: 0.1 K/UL — SIGNIFICANT CHANGE UP (ref 0–0.5)
EOSINOPHIL NFR BLD AUTO: 1.2 % — SIGNIFICANT CHANGE UP (ref 0–6)
GLUCOSE SERPL-MCNC: 131 MG/DL — HIGH (ref 70–99)
HCT VFR BLD CALC: 34.4 % — LOW (ref 39–50)
HGB BLD-MCNC: 12.1 G/DL — LOW (ref 13–17)
LYMPHOCYTES # BLD AUTO: 0.8 K/UL — LOW (ref 1–3.3)
LYMPHOCYTES # BLD AUTO: 17.1 % — SIGNIFICANT CHANGE UP (ref 13–44)
MCHC RBC-ENTMCNC: 32.9 PG — SIGNIFICANT CHANGE UP (ref 27–34)
MCHC RBC-ENTMCNC: 35.1 GM/DL — SIGNIFICANT CHANGE UP (ref 32–36)
MCV RBC AUTO: 93.7 FL — SIGNIFICANT CHANGE UP (ref 80–100)
MONOCYTES # BLD AUTO: 0.6 K/UL — SIGNIFICANT CHANGE UP (ref 0–0.9)
MONOCYTES NFR BLD AUTO: 12.2 % — SIGNIFICANT CHANGE UP (ref 2–14)
NEUTROPHILS # BLD AUTO: 3.4 K/UL — SIGNIFICANT CHANGE UP (ref 1.8–7.4)
NEUTROPHILS NFR BLD AUTO: 69.2 % — SIGNIFICANT CHANGE UP (ref 43–77)
PLATELET # BLD AUTO: 199 K/UL — SIGNIFICANT CHANGE UP (ref 150–400)
POTASSIUM SERPL-MCNC: 4.1 MMOL/L — SIGNIFICANT CHANGE UP (ref 3.5–5.3)
POTASSIUM SERPL-SCNC: 4.1 MMOL/L — SIGNIFICANT CHANGE UP (ref 3.5–5.3)
RBC # BLD: 3.67 M/UL — LOW (ref 4.2–5.8)
RBC # FLD: 11.6 % — SIGNIFICANT CHANGE UP (ref 10.3–14.5)
SODIUM SERPL-SCNC: 137 MMOL/L — SIGNIFICANT CHANGE UP (ref 135–145)
WBC # BLD: 4.9 K/UL — SIGNIFICANT CHANGE UP (ref 3.8–10.5)
WBC # FLD AUTO: 4.9 K/UL — SIGNIFICANT CHANGE UP (ref 3.8–10.5)

## 2019-03-25 PROCEDURE — 85610 PROTHROMBIN TIME: CPT

## 2019-03-25 PROCEDURE — 97110 THERAPEUTIC EXERCISES: CPT

## 2019-03-25 PROCEDURE — 84295 ASSAY OF SERUM SODIUM: CPT

## 2019-03-25 PROCEDURE — 83036 HEMOGLOBIN GLYCOSYLATED A1C: CPT

## 2019-03-25 PROCEDURE — 87102 FUNGUS ISOLATION CULTURE: CPT

## 2019-03-25 PROCEDURE — 99233 SBSQ HOSP IP/OBS HIGH 50: CPT

## 2019-03-25 PROCEDURE — 97530 THERAPEUTIC ACTIVITIES: CPT

## 2019-03-25 PROCEDURE — C1889: CPT

## 2019-03-25 PROCEDURE — 71045 X-RAY EXAM CHEST 1 VIEW: CPT

## 2019-03-25 PROCEDURE — 81001 URINALYSIS AUTO W/SCOPE: CPT

## 2019-03-25 PROCEDURE — 80048 BASIC METABOLIC PNL TOTAL CA: CPT

## 2019-03-25 PROCEDURE — 82435 ASSAY OF BLOOD CHLORIDE: CPT

## 2019-03-25 PROCEDURE — 84100 ASSAY OF PHOSPHORUS: CPT

## 2019-03-25 PROCEDURE — 84132 ASSAY OF SERUM POTASSIUM: CPT

## 2019-03-25 PROCEDURE — 70450 CT HEAD/BRAIN W/O DYE: CPT

## 2019-03-25 PROCEDURE — 86900 BLOOD TYPING SEROLOGIC ABO: CPT

## 2019-03-25 PROCEDURE — 85027 COMPLETE CBC AUTOMATED: CPT

## 2019-03-25 PROCEDURE — 97166 OT EVAL MOD COMPLEX 45 MIN: CPT

## 2019-03-25 PROCEDURE — 82947 ASSAY GLUCOSE BLOOD QUANT: CPT

## 2019-03-25 PROCEDURE — 83605 ASSAY OF LACTIC ACID: CPT

## 2019-03-25 PROCEDURE — 87015 SPECIMEN INFECT AGNT CONCNTJ: CPT

## 2019-03-25 PROCEDURE — 97162 PT EVAL MOD COMPLEX 30 MIN: CPT

## 2019-03-25 PROCEDURE — 87070 CULTURE OTHR SPECIMN AEROBIC: CPT

## 2019-03-25 PROCEDURE — 86901 BLOOD TYPING SEROLOGIC RH(D): CPT

## 2019-03-25 PROCEDURE — 73090 X-RAY EXAM OF FOREARM: CPT

## 2019-03-25 PROCEDURE — 99284 EMERGENCY DEPT VISIT MOD MDM: CPT | Mod: 25

## 2019-03-25 PROCEDURE — 93005 ELECTROCARDIOGRAM TRACING: CPT

## 2019-03-25 PROCEDURE — 96374 THER/PROPH/DIAG INJ IV PUSH: CPT

## 2019-03-25 PROCEDURE — C1713: CPT

## 2019-03-25 PROCEDURE — 87206 SMEAR FLUORESCENT/ACID STAI: CPT

## 2019-03-25 PROCEDURE — 94002 VENT MGMT INPAT INIT DAY: CPT

## 2019-03-25 PROCEDURE — 82550 ASSAY OF CK (CPK): CPT

## 2019-03-25 PROCEDURE — 84484 ASSAY OF TROPONIN QUANT: CPT

## 2019-03-25 PROCEDURE — 93970 EXTREMITY STUDY: CPT

## 2019-03-25 PROCEDURE — 82803 BLOOD GASES ANY COMBINATION: CPT

## 2019-03-25 PROCEDURE — 85730 THROMBOPLASTIN TIME PARTIAL: CPT

## 2019-03-25 PROCEDURE — 82330 ASSAY OF CALCIUM: CPT

## 2019-03-25 PROCEDURE — 88305 TISSUE EXAM BY PATHOLOGIST: CPT

## 2019-03-25 PROCEDURE — 86850 RBC ANTIBODY SCREEN: CPT

## 2019-03-25 PROCEDURE — 72170 X-RAY EXAM OF PELVIS: CPT

## 2019-03-25 PROCEDURE — 87116 MYCOBACTERIA CULTURE: CPT

## 2019-03-25 PROCEDURE — 85014 HEMATOCRIT: CPT

## 2019-03-25 PROCEDURE — 97116 GAIT TRAINING THERAPY: CPT

## 2019-03-25 PROCEDURE — 73110 X-RAY EXAM OF WRIST: CPT

## 2019-03-25 PROCEDURE — 83735 ASSAY OF MAGNESIUM: CPT

## 2019-03-25 PROCEDURE — 82962 GLUCOSE BLOOD TEST: CPT

## 2019-03-25 PROCEDURE — 80053 COMPREHEN METABOLIC PANEL: CPT

## 2019-03-25 RX ORDER — SOTALOL HCL 120 MG
1 TABLET ORAL
Qty: 0 | Refills: 0 | DISCHARGE
Start: 2019-03-25

## 2019-03-25 RX ORDER — CELECOXIB 200 MG/1
1 CAPSULE ORAL
Qty: 0 | Refills: 0 | COMMUNITY

## 2019-03-25 RX ORDER — DOCUSATE SODIUM 100 MG
1 CAPSULE ORAL
Qty: 0 | Refills: 0 | DISCHARGE
Start: 2019-03-25

## 2019-03-25 RX ORDER — SENNA PLUS 8.6 MG/1
2 TABLET ORAL
Qty: 0 | Refills: 0 | DISCHARGE
Start: 2019-03-25

## 2019-03-25 RX ORDER — POLYETHYLENE GLYCOL 3350 17 G/17G
17 POWDER, FOR SOLUTION ORAL
Qty: 0 | Refills: 0 | DISCHARGE
Start: 2019-03-25

## 2019-03-25 RX ORDER — OXYCODONE HYDROCHLORIDE 5 MG/1
1 TABLET ORAL
Qty: 56 | Refills: 0
Start: 2019-03-25

## 2019-03-25 RX ADMIN — LISINOPRIL 40 MILLIGRAM(S): 2.5 TABLET ORAL at 16:37

## 2019-03-25 RX ADMIN — POLYETHYLENE GLYCOL 3350 17 GRAM(S): 17 POWDER, FOR SOLUTION ORAL at 05:12

## 2019-03-25 RX ADMIN — Medication 120 MILLIGRAM(S): at 16:38

## 2019-03-25 RX ADMIN — AMLODIPINE BESYLATE 10 MILLIGRAM(S): 2.5 TABLET ORAL at 05:12

## 2019-03-25 RX ADMIN — Medication 100 MILLIGRAM(S): at 05:12

## 2019-03-25 RX ADMIN — POLYETHYLENE GLYCOL 3350 17 GRAM(S): 17 POWDER, FOR SOLUTION ORAL at 16:37

## 2019-03-25 RX ADMIN — LISINOPRIL 40 MILLIGRAM(S): 2.5 TABLET ORAL at 05:12

## 2019-03-25 RX ADMIN — Medication 100 MILLIGRAM(S): at 13:18

## 2019-03-25 RX ADMIN — Medication 120 MILLIGRAM(S): at 05:12

## 2019-03-25 NOTE — DISCHARGE NOTE PROVIDER - HOSPITAL COURSE
78M pmhx afib s/p AICD on eliquis s/p multiple falls since January of this year with worsening gait over 2-3 weeks found to have R>L B/l acute on chronic SDH. On exam LUE pronator drift, shuffling gait, RUE mild resting tremor, otherwise Intact. (17 Mar 2019 13:26) 78M pmhx afib s/p AICD on eliquis s/p multiple falls since January of this year with worsening gait over 2-3 weeks found to have R>L B/l acute on chronic SDH. On exam LUE pronator drift, shuffling gait, RUE mild resting tremor, otherwise Intact. (17 Mar 2019 13:26)        Patient admitted after CTH revealed bilateral subdural hematomas. On 3/20/19 s/p bilateral craniotomies for evacuation of SDHs. Routine post op care in [facility]. 3/22/19 subdural drains removed and he was transferred to the floor on 3/23/19. Post operatively he had reported vtach episodes while on tele. His AICD was interrogated and pacing well. He will remain off his eliquis for anticoagulation until cleared by Dr Xavier. He was seen in consultation by cardiology and followed by medicine. PT/OT evaluated him and recommended acute rehab. Ultimately he refused rehab and home PT/OT arranged. On 3/25/19 he was medically and neurologically stable or discharge to home with home services.

## 2019-03-25 NOTE — DISCHARGE NOTE PROVIDER - NSDCACTIVITY_GEN_ALL_CORE
Walking - Indoors allowed/No heavy lifting/straining/Bathing allowed/Walking - Outdoors allowed/Do not drive or operate machinery/Stairs allowed/Showering allowed/Do not make important decisions

## 2019-03-25 NOTE — PROGRESS NOTE ADULT - ASSESSMENT
78M pmhx afib s/p AICD on eliquis s/p multiple falls since January of this year with worsening gait over 2-3 weeks found to have R>L B/l acute on chronic SDH. On exam LUE pronator drift, shuffling gait, RUE mild resting tremor, otherwise Intact. (17 Mar 2019 13:26)    PAST MEDICAL & SURGICAL HISTORY:  Urinary urgency  Urinary frequency  HLD (hyperlipidemia)  Overactive bladder  HTN (hypertension)  Type 2 diabetes mellitus  H/O colonoscopy with polypectomy  History of prostate surgery: s/p prostate microwave therapy  S/P TURP (status post transurethral resection of prostate)  H/O left inguinal hernia repair: with mesh 15 years ago    PROCEDURE: 3/20/19 s/p bilateral craniotomies for evacuation of SDHs                      3/17/19 s/p R distal radius fracture casted by ortho here- Dr Lanza    PLAN:  Neuro: completed 7 days seizure prophylaxis; cont increase OOB; pain meds PRN;   Respiratory: patient instructed on incentive spirometer  CV: cont off eliquis for afib given SDHs/OR; confirmed with Dr Xavier will determine timing as outpatient to restart [facility] cardiology following; appreciate EP interrogation today (see chart); cont sotalol for afib; cont lisinopril and amlodipine for HTN  Endocrine: cont insulins for type 2 DM          DVT ppx: [] SQH [x] SQL and venodynes bilaterally  Renal: IVL  ID: afebrile  GI: bowel regimen  PT/OT/PMR- acute rehab, however patient and wife refusing and home PT/OT arranged.   Dr Blair from medicine following  ortho f/u outpatient   discussed with cardiology at bedside, no objection to discharge and f/u outpatient as discussed.   d/c IVL and d/c home today    discussed with Dr Xavier @ bedside  Spectralink # [id number]    Assessment:  Please Check When Present   []  GCS  E   V  M     Heart Failure: []Acute, [] acute on chronic , []chronic  Heart Failure:  [] Diastolic (HFpEF), [] Systolic (HFrEF), []Combined (HFpEF and HFrEF), [] RHF, [] Pulm HTN, [] Other    [] OSCAR, [] ATN, [] AIN, [] other  [] CKD1, [] CKD2, [] CKD 3, [] CKD 4, [] CKD 5, []ESRD    Encephalopathy: [] Metabolic, [] Hepatic, [] toxic, [] Neurological, [] Other    Abnormal Nutritional Status: [] malnutrition (see nutrition note), [ ]underweight: BMI < 19, [] morbid obesity: BMI >40, [] Cachexia    [] Sepsis  [] hypovolemic shock,[] cardiogenic shock, [] hemorrhagic shock, [] neurogenic shock  [] Acute Respiratory Failure  [x]Cerebral edema- due to SDH, [] Brain compression/ herniation,   [] Functional quadriplegia  [] Acute blood loss anemia

## 2019-03-25 NOTE — DISCHARGE NOTE PROVIDER - PROVIDER TOKENS
PROVIDER:[TOKEN:[[id number]]],PROVIDER:[TOKEN:[[id number]]],PROVIDER:[TOKEN:[[id number]]]

## 2019-03-25 NOTE — DISCHARGE NOTE PROVIDER - CARE PROVIDERS DIRECT ADDRESSES
[email or web address]

## 2019-03-25 NOTE — CHART NOTE - NSCHARTNOTEFT_GEN_A_CORE
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 s/p bilateral craniotomies for evacuation of SDHs (3/20). Transferred to telemetry unit yesterday for further monitoring.    Source: Pt, spouse, EMR    Diet : Consistent CHO w/ evening snack + Glucerna 2x/day + DanActive 2x/day    Patient reports: Good appetite/intake upon visit. Spouse reports pt does not consume all of food on tray as it is "too much food." Pt does not wish to receive Glucerna supplement, will d/c upon request. Amenable to continuing DanActive. Pt reports constipation, on bowel regimen. No other signs of GI distress noted at this time.     Daily Weight in k.7 (-)  No new weight change to report.    Pertinent Medications: MEDICATIONS  (STANDING):  amLODIPine   Tablet 10 milliGRAM(s) Oral daily  dextrose 5%. 1000 milliLiter(s) (50 mL/Hr) IV Continuous <Continuous>  dextrose 50% Injectable 12.5 Gram(s) IV Push once  dextrose 50% Injectable 25 Gram(s) IV Push once  dextrose 50% Injectable 25 Gram(s) IV Push once  docusate sodium 100 milliGRAM(s) Oral three times a day  doxazosin 1 milliGRAM(s) Oral at bedtime  enoxaparin Injectable 40 milliGRAM(s) SubCutaneous <User Schedule>  insulin glargine Injectable (LANTUS) 8 Unit(s) SubCutaneous at bedtime  insulin lispro (HumaLOG) corrective regimen sliding scale   SubCutaneous Before meals and at bedtime  lisinopril 40 milliGRAM(s) Oral every 12 hours  LORazepam     Tablet 0.5 milliGRAM(s) Oral at bedtime  polyethylene glycol 3350 17 Gram(s) Oral every 12 hours  senna 2 Tablet(s) Oral at bedtime  sotalol 120 milliGRAM(s) Oral two times a day    MEDICATIONS  (PRN):  bisacodyl Suppository 10 milliGRAM(s) Rectal daily PRN Constipation  dextrose 40% Gel 15 Gram(s) Oral once PRN Blood Glucose LESS THAN 70 milliGRAM(s)/deciliter  glucagon  Injectable 1 milliGRAM(s) IntraMuscular once PRN Glucose LESS THAN 70 milligrams/deciliter  ondansetron Injectable 4 milliGRAM(s) IV Push every 6 hours PRN Nausea and/or Vomiting  oxyCODONE    IR 5 milliGRAM(s) Oral every 4 hours PRN Moderate Pain (4 - 6)  oxyCODONE    IR 10 milliGRAM(s) Oral every 4 hours PRN Severe Pain (7 - 10)    Pertinent Labs:  @ 06:09: Na 137, BUN 23, Cr 1.02, <H>, K+ 4.1, HbA1c 7.5 (3/18)    Finger Sticks:  POCT Blood Glucose.: 132 mg/dL ( @ 08:36)  POCT Blood Glucose.: 178 mg/dL ( @ 21:26)  POCT Blood Glucose.: 162 mg/dL ( @ 17:07)  POCT Blood Glucose.: 211 mg/dL ( @ 12:34)      Skin per nursing documentation: No pressure ulcers  Edema: None    Estimated Needs:   [ x ] no change since previous assessment  [ ] recalculated:     Previous Nutrition Diagnosis: Moderate malnutrition; Increased nutrient needs  Nutrition Diagnosis is: Ongoing, being addressed with diet + supplementation     Interventions:   Discussed T2DM management with pt. Pt on oral meds at home, no insulin. Finger sticks usually 130-140. Pt consumes 3 balanced meals per day, snack in between if needed. Provided T2DM diet education to pt and spouse. Discussed balanced meal pattern, monitoring portion sizes, carbohydrate counting, including protein at each meal and snack, and limiting concentrated sweets. Encouraged physical activity as medically appropriate. Advised pt to moderate consumption of sugar-sweetened beverages, replace with water to meet hydration needs. Reinforced importance of self-monitoring blood glucose levels. Encouraged pt to consume protein first in-house on meal tray, reinforced protein needs for recovery. Pt/spouse verbalized understanding.     Recommend  1) Continue with Consistent CHO diet as ordered  2) Continue DanActive 2x/day. D/C Glucerna two times per day   3) Encourage adequate PO intake    Monitoring and Evaluation:     Continue to monitor Nutritional intake, Tolerance to diet prescription, weights, labs, skin integrity    RD remains available upon request and will follow up per protocol  Callie Carcamo, Dietetic Intern [id number] Nutrition Follow Up Note  Patient seen for: Malnutrition Follow-Up    Pt is a 79 y/o M who presents with R>L acute on chronic subdural hematoma with LUE pronator drift, shuffling gait and RUE mild resting tremor. s/p bilateral craniotomies for evacuation of SDHs (3/20). Transferred to telemetry unit yesterday for further monitoring.    Source: Pt, spouse, EMR    Diet : Consistent CHO w/ evening snack + Glucerna 2x/day + DanActive 2x/day    Patient reports: Good appetite/intake upon visit. Spouse reports pt does not consume all of food on tray as it is "too much food." Pt does not wish to receive Glucerna supplement, will d/c upon request. Amenable to continuing DanActive. Pt reports constipation, on bowel regimen. No other signs of GI distress noted at this time.     Daily Weight in k.7 (-)  No new weight change to report.    Pertinent Medications: MEDICATIONS  (STANDING):  amLODIPine   Tablet 10 milliGRAM(s) Oral daily  dextrose 5%. 1000 milliLiter(s) (50 mL/Hr) IV Continuous <Continuous>  dextrose 50% Injectable 12.5 Gram(s) IV Push once  dextrose 50% Injectable 25 Gram(s) IV Push once  dextrose 50% Injectable 25 Gram(s) IV Push once  docusate sodium 100 milliGRAM(s) Oral three times a day  doxazosin 1 milliGRAM(s) Oral at bedtime  enoxaparin Injectable 40 milliGRAM(s) SubCutaneous <User Schedule>  insulin glargine Injectable (LANTUS) 8 Unit(s) SubCutaneous at bedtime  insulin lispro (HumaLOG) corrective regimen sliding scale   SubCutaneous Before meals and at bedtime  lisinopril 40 milliGRAM(s) Oral every 12 hours  LORazepam     Tablet 0.5 milliGRAM(s) Oral at bedtime  polyethylene glycol 3350 17 Gram(s) Oral every 12 hours  senna 2 Tablet(s) Oral at bedtime  sotalol 120 milliGRAM(s) Oral two times a day    MEDICATIONS  (PRN):  bisacodyl Suppository 10 milliGRAM(s) Rectal daily PRN Constipation  dextrose 40% Gel 15 Gram(s) Oral once PRN Blood Glucose LESS THAN 70 milliGRAM(s)/deciliter  glucagon  Injectable 1 milliGRAM(s) IntraMuscular once PRN Glucose LESS THAN 70 milligrams/deciliter  ondansetron Injectable 4 milliGRAM(s) IV Push every 6 hours PRN Nausea and/or Vomiting  oxyCODONE    IR 5 milliGRAM(s) Oral every 4 hours PRN Moderate Pain (4 - 6)  oxyCODONE    IR 10 milliGRAM(s) Oral every 4 hours PRN Severe Pain (7 - 10)    Pertinent Labs:  @ 06:09: Na 137, BUN 23, Cr 1.02, <H>, K+ 4.1, HbA1c 7.5 (3/18)    Finger Sticks:  POCT Blood Glucose.: 132 mg/dL ( @ 08:36)  POCT Blood Glucose.: 178 mg/dL ( @ 21:26)  POCT Blood Glucose.: 162 mg/dL ( @ 17:07)  POCT Blood Glucose.: 211 mg/dL ( @ 12:34)      Skin per nursing documentation: No pressure ulcers  Edema: None    Estimated Needs:   [ x ] no change since previous assessment  [ ] recalculated:     Previous Nutrition Diagnosis: Moderate malnutrition; Increased nutrient needs  Nutrition Diagnosis is: Ongoing, being addressed with diet + supplementation     Interventions:   Discussed T2DM management with pt. Pt on oral meds at home, no insulin. Finger sticks usually 130-140. Pt consumes 3 balanced meals per day, snack in between if needed. Provided T2DM diet education to pt and spouse. Discussed balanced meal pattern, monitoring portion sizes, carbohydrate counting, including protein at each meal and snack, and limiting concentrated sweets. Encouraged physical activity as medically appropriate. Advised pt to moderate consumption of sugar-sweetened beverages, replace with water to meet hydration needs. Reinforced importance of self-monitoring blood glucose levels. Encouraged pt to consume protein first in-house on meal tray, reinforced protein needs for recovery. Written educational materials deferred at this time. Pt/spouse verbalized understanding.     Recommend  1) Continue with Consistent CHO diet as ordered  2) Continue DanActive 2x/day. D/C Glucerna two times per day   3) Encourage adequate PO intake    Monitoring and Evaluation:     Continue to monitor Nutritional intake, Tolerance to diet prescription, weights, labs, skin integrity    RD remains available upon request and will follow up per protocol  Callie Carcamo, Dietetic Intern [phone]

## 2019-03-25 NOTE — PROGRESS NOTE ADULT - SUBJECTIVE AND OBJECTIVE BOX
SUBJ:  Called for "events" on telemetry hours before discharge. Telemetry demonstrates paroxysmal atrial fibrillation with V-paced beats when bradycardic in the setting of slow ventricular response. Converted to NSR spontaneously overnight while on sotalol 120 mg BID. Not on anticoagulation due to b/l SDH s/p ja hole and left craniotomy. Denies chest pain shortness of breath, palpitations, but he does endorse several weeks of fatigue     MEDICATIONS  (STANDING):  amLODIPine   Tablet 10 milliGRAM(s) Oral daily  dextrose 5%. 1000 milliLiter(s) (50 mL/Hr) IV Continuous <Continuous>  dextrose 50% Injectable 12.5 Gram(s) IV Push once  dextrose 50% Injectable 25 Gram(s) IV Push once  dextrose 50% Injectable 25 Gram(s) IV Push once  docusate sodium 100 milliGRAM(s) Oral three times a day  doxazosin 1 milliGRAM(s) Oral at bedtime  enoxaparin Injectable 40 milliGRAM(s) SubCutaneous <User Schedule>  insulin glargine Injectable (LANTUS) 8 Unit(s) SubCutaneous at bedtime  insulin lispro (HumaLOG) corrective regimen sliding scale   SubCutaneous Before meals and at bedtime  lisinopril 40 milliGRAM(s) Oral every 12 hours  LORazepam     Tablet 0.5 milliGRAM(s) Oral at bedtime  polyethylene glycol 3350 17 Gram(s) Oral every 12 hours  senna 2 Tablet(s) Oral at bedtime  sotalol 120 milliGRAM(s) Oral two times a day    MEDICATIONS  (PRN):  bisacodyl Suppository 10 milliGRAM(s) Rectal daily PRN Constipation  dextrose 40% Gel 15 Gram(s) Oral once PRN Blood Glucose LESS THAN 70 milliGRAM(s)/deciliter  glucagon  Injectable 1 milliGRAM(s) IntraMuscular once PRN Glucose LESS THAN 70 milligrams/deciliter  ondansetron Injectable 4 milliGRAM(s) IV Push every 6 hours PRN Nausea and/or Vomiting  oxyCODONE    IR 5 milliGRAM(s) Oral every 4 hours PRN Moderate Pain (4 - 6)  oxyCODONE    IR 10 milliGRAM(s) Oral every 4 hours PRN Severe Pain (7 - 10)    Vital Signs Last 24 Hrs  T(C): 36.8 (25 Mar 2019 15:19), Max: 37 (25 Mar 2019 04:50)  T(F): 98.2 (25 Mar 2019 15:19), Max: 98.6 (25 Mar 2019 04:50)  HR: 63 (25 Mar 2019 15:19) (62 - 65)  BP: 157/72 (25 Mar 2019 15:19) (124/61 - 157/75)  RR: 18 (25 Mar 2019 15:19) (18 - 20)  SpO2: 96% (25 Mar 2019 15:19) (96% - 99%)    REVIEW OF SYSTEMS:  As per HPI, otherwise unremarkable.     PHYSICAL EXAM:  · CONSTITUTIONAL: Well-developed  · EYES: no drainage or redness  · NECK: supple  ·RESPIRATORY:   airway patent; breath sounds equal; good air movement; respirations non-labored; clear to auscultation bilaterally  · CARDIOVASCULAR: regular rate and rhythm  . GASTROINTESTINAL:  no distention; no masses palpable  · EXTREMITIES: No cyanosis, clubbing. Trace lower extremity edema   · VASCULAR:  Equal and normal pulses (radial)  ·NEUROLOGICAL:  Alert and oriented x 3  · SKIN: No lesions; no rash. Head incisions appear clean dry and intact.   . LYMPH NODES: No lymphadedenopathy  · MUSCULOSKELETAL:  No calf tenderness     TELEMETRY: a-paced rhythm, paroxysmal atrial fibrillation with slow ventricular response leading to appropriate v-paced beats.     LABS:   CBC Full  -  ( 25 Mar 2019 06:09 )  WBC Count : 4.9 K/uL  Hemoglobin : 12.1 g/dL  Hematocrit : 34.4 %  Platelet Count - Automated : 199 K/uL  Mean Cell Volume : 93.7 fl  Mean Cell Hemoglobin : 32.9 pg  Mean Cell Hemoglobin Concentration : 35.1 gm/dL  Auto Neutrophil # : 3.4 K/uL  Auto Lymphocyte # : 0.8 K/uL  Auto Monocyte # : 0.6 K/uL  Auto Eosinophil # : 0.1 K/uL  Auto Basophil # : 0.0 K/uL  Auto Neutrophil % : 69.2 %  Auto Lymphocyte % : 17.1 %  Auto Monocyte % : 12.2 %  Auto Eosinophil % : 1.2 %  Auto Basophil % : 0.3 %    03-25    137  |  102  |  23  ----------------------------<  131<H>  4.1   |  23  |  1.02    Ca    9.0      25 Mar 2019 06:09    IMPRESSION AND PLAN:  78y Male patient PMH apical variant HCM, paroxysmal atrial fib s/p PPM/ICD 1/17/19 on Eliquis, HTN, HLD, DM, BPH presenting with a mechanical fall complicated by b/l SDH requiring Ja hole and left craniotomy.     1) pAF   Elevated CHADS2-Vasc; indicated for A/C but the risks outweigh the benefits and therefore it has been discontinued at this time.   s/p PPM/ICD  Telemetry with NSR and pAF and appropriate V-paced beats in the setting of SVR.     ·	Continue medical management with sotalol 120 mg BID.   ·	Holding A/C due to b/l SDH  ·	Follow up outpatient with EP.     2) Apical variant HCM  s/p PPM/ICD    ·	Continue medical management with lisinopril 40 mg daily.     3) HTN   Well controlled.    ·	Continue medical management with amlodipine 10 mg daily, lisinopril 40 mg daily.     Wife frustrated with continuity of cardiac care. I answered all questions and provided her with recommended cardiologists to follow outpatient although I am unsure of outpatient availability.     Josefina Rocha MD, MPH, KARMA  Cardiovascular Specialist Attending  [facility]  C: [phone]  E: [email or web address]  (Cardiology nocturnist available every night 7 pm - 7 am; available week days for follow-up; general cardiology consult coverage weekend days)

## 2019-03-25 NOTE — PROGRESS NOTE ADULT - PROVIDER SPECIALTY LIST ADULT
Cardiology
Internal Medicine
NSICU
Neurosurgery

## 2019-03-25 NOTE — DISCHARGE NOTE PROVIDER - CARE PROVIDER_API CALL
Jhoan Xavier)  Neurosurgery  General  [address], [address]  [place] [address]  Phone: [phone]  Fax: [phone]  Follow Up Time:     Jeremy Lanza)  Orthopaedic Surgery  [address], [address]  [place] [address]  Phone: [phone]  Fax: [phone]  Follow Up Time:     Carissa Avery)  Cardiovascular Disease; Interventional Cardiology  [address] [place] [id number]  Phone: [phone]  Fax: [phone]  Follow Up Time:

## 2019-03-25 NOTE — PROGRESS NOTE ADULT - PROBLEM SELECTOR PLAN 1
Status post surgery.  Appears well today.  May be discharged acute rehab, however family and patient want to take him home

## 2019-03-25 NOTE — PROGRESS NOTE ADULT - SUBJECTIVE AND OBJECTIVE BOX
CHIEF COMPLAINT: patient without complaints, denies CP/SOB; +tele- no events    Vital Signs Last 24 Hrs  T(C): 36.8 (25 Mar 2019 15:19), Max: 37 (25 Mar 2019 04:50)  T(F): 98.2 (25 Mar 2019 15:19), Max: 98.6 (25 Mar 2019 04:50)  HR: 63 (25 Mar 2019 15:19) (62 - 81)  BP: 157/72 (25 Mar 2019 15:19) (107/68 - 157/75)  BP(mean): --  RR: 18 (25 Mar 2019 15:19) (18 - 20)  SpO2: 96% (25 Mar 2019 15:19) (96% - 99%)    PHYSICAL EXAM:    General: No Acute Distress     Neurological: Awake, alert oriented to person, place and time, Following Commands, PERRL, EOMI, Face Symmetrical, Speech Fluent, Moving all extremities, Muscle Strength normal in all four extremities, +R forearm cast, No Drift, Sensation to Light Touch Intact    Pulmonary: Clear to Auscultation, No Rales, No Rhonchi, No Wheezes     Cardiovascular: S1, S2, Regular Rate and Rhythm     Gastrointestinal: Soft, Nontender, Nondistended     Incision: +staples c/d/i    +R forearm cast (R distal radius fracture casted by ortho here 3/17/19 Dr Lanza)    LABS:                        12.1   4.9   )-----------( 199      ( 25 Mar 2019 06:09 )             34.4   03-25    137  |  102  |  23  ----------------------------<  131<H>  4.1   |  23  |  1.02    Ca    9.0      25 Mar 2019 06:09      MEDICATIONS:  Anticoagulation:  enoxaparin Injectable 40 milliGRAM(s) SubCutaneous <User Schedule>    Endo:  dextrose 40% Gel 15 Gram(s) Oral once PRN  dextrose 50% Injectable 12.5 Gram(s) IV Push once  dextrose 50% Injectable 25 Gram(s) IV Push once  dextrose 50% Injectable 25 Gram(s) IV Push once  glucagon  Injectable 1 milliGRAM(s) IntraMuscular once PRN  insulin glargine Injectable (LANTUS) 8 Unit(s) SubCutaneous at bedtime  insulin lispro (HumaLOG) corrective regimen sliding scale   SubCutaneous Before meals and at bedtime    Neuro:  LORazepam     Tablet 0.5 milliGRAM(s) Oral at bedtime  ondansetron Injectable 4 milliGRAM(s) IV Push every 6 hours PRN Nausea and/or Vomiting  oxyCODONE    IR 5 milliGRAM(s) Oral every 4 hours PRN Moderate Pain (4 - 6)  oxyCODONE    IR 10 milliGRAM(s) Oral every 4 hours PRN Severe Pain (7 - 10)    Cardiac:  amLODIPine   Tablet 10 milliGRAM(s) Oral daily  doxazosin 1 milliGRAM(s) Oral at bedtime  lisinopril 40 milliGRAM(s) Oral every 12 hours  sotalol 120 milliGRAM(s) Oral two times a day    GI/:  bisacodyl Suppository 10 milliGRAM(s) Rectal daily PRN Constipation  docusate sodium 100 milliGRAM(s) Oral three times a day  polyethylene glycol 3350 17 Gram(s) Oral every 12 hours  senna 2 Tablet(s) Oral at bedtime    Other:   dextrose 5%. 1000 milliLiter(s) IV Continuous <Continuous>    DIET: [] Regular [x] CCD [] Renal [] Puree [] Dysphagia [] Tube Feeds:     IMAGING:   < from: CT Head No Cont (03.22.19 @ 09:39) >  INTERPRETATION:  History: Status post evacuation of subdural hematomas    Multiple axial sections were performed from base of skull to vertex   without contrast enhancement. Coronal and sagittal reconstructions were   performed as well    This exam is compared with prior noncontrast head CT performed on March 21, 2019.    Postop changes compatible with a high right frontal craniotomy and high   left frontal burrhole. Previously noted bilateral subdural drains have   been removed. There is evidence of subdural air with some residual mixed   attenuated subdural hematomas again identified. This finding measures   approximately 1.4 cm on the right side and 1.1 cm on the left side. There   is localized mass effect consisting sulcal effacement which is more   prominent on the right side.    The size and configuration of the ventricles appear unchanged.    Evaluation the osseous structures with appropriate window aside from   postop changes appear unremarkable. Extracalvarial soft tissue swelling   and staples are identified in the postop region.    Right maxillary polyp is retention cyst is seen.    Both mastoid and middle ear regions appear clear.    Impression: Previously noted bilateral subdural drains have been removed.    < end of copied text >

## 2019-03-25 NOTE — PROGRESS NOTE ADULT - SUBJECTIVE AND OBJECTIVE BOX
Patient is a 78y old  Male who presents with a chief complaint of b/l SDH (19 Mar 2019 10:31)      SUBJECTIVE / OVERNIGHT EVENTS:  Status post surgery and evacuation of subdural hematomas Wife claims he has polyuria and wants to take him home for "chinese food"  Review of Systems:   CONSTITUTIONAL: No fever, weight loss, or fatigue  EYES: No eye pain, visual disturbances, or discharge  ENMT:  No difficulty hearing, tinnitus, vertigo; No sinus or throat pain  NECK: No pain or stiffness  BREASTS: No pain, masses, or nipple discharge  RESPIRATORY: No cough, wheezing, chills or hemoptysis; No shortness of breath  CARDIOVASCULAR: No chest pain, palpitations, dizziness, or leg swelling  GASTROINTESTINAL: No abdominal or epigastric pain. No nausea, vomiting, or hematemesis; No diarrhea or constipation. No melena or hematochezia.  GENITOURINARY: No dysuria,  hematuria, or incontinence  NEUROLOGICAL: No headaches, memory loss, loss of strength, numbness, or tremors  SKIN: No itching, burning, rashes, or lesions   LYMPH NODES: No enlarged glands  ENDOCRINE: No heat or cold intolerance; No hair loss  MUSCULOSKELETAL: No joint pain or swelling; No muscle, back, or extremity pain  PSYCHIATRIC: No depression, anxiety, mood swings, or difficulty sleeping  HEME/LYMPH: No easy bruising, or bleeding gums  ALLERY AND IMMUNOLOGIC: No hives or eczema    MEDICATIONS  (STANDING):  amLODIPine   Tablet 10 milliGRAM(s) Oral daily  dextrose 5%. 1000 milliLiter(s) (50 mL/Hr) IV Continuous <Continuous>  dextrose 50% Injectable 12.5 Gram(s) IV Push once  dextrose 50% Injectable 25 Gram(s) IV Push once  dextrose 50% Injectable 25 Gram(s) IV Push once  docusate sodium 100 milliGRAM(s) Oral three times a day  insulin lispro (HumaLOG) corrective regimen sliding scale   SubCutaneous three times a day before meals  insulin lispro (HumaLOG) corrective regimen sliding scale   SubCutaneous at bedtime  levETIRAcetam 500 milliGRAM(s) Oral every 12 hours  lisinopril 40 milliGRAM(s) Oral daily  senna 2 Tablet(s) Oral at bedtime  sodium chloride 0.9% lock flush 3 milliLiter(s) IV Push every 8 hours  sotalol 120 milliGRAM(s) Oral two times a day    MEDICATIONS  (PRN):  acetaminophen   Tablet .. 650 milliGRAM(s) Oral every 6 hours PRN Temp greater or equal to 38C (100.4F), Mild Pain (1 - 3)  bisacodyl 5 milliGRAM(s) Oral every 12 hours PRN Constipation  dextrose 40% Gel 15 Gram(s) Oral once PRN Blood Glucose LESS THAN 70 milliGRAM(s)/deciliter  diphenhydrAMINE 25 milliGRAM(s) Oral every 4 hours PRN Rash and/or Itching  glucagon  Injectable 1 milliGRAM(s) IntraMuscular once PRN Glucose LESS THAN 70 milligrams/deciliter  LORazepam     Tablet 1 milliGRAM(s) Oral at bedtime PRN Anxiety  polyethylene glycol 3350 17 Gram(s) Oral every 24 hours PRN Constipation      PHYSICAL EXAM:  Vital Signs Last 24 Hrs  T(C): 37.2 (19 Mar 2019 20:34), Max: 37.2 (19 Mar 2019 20:34)  T(F): 99 (19 Mar 2019 20:34), Max: 99 (19 Mar 2019 20:34)  HR: 61 (19 Mar 2019 20:34) (61 - 65)  BP: 131/68 (19 Mar 2019 20:34) (121/65 - 167/78)  BP(mean): --  RR: 18 (19 Mar 2019 20:34) (18 - 19)  SpO2: 97% (19 Mar 2019 20:34) (96% - 98%)  I&O's Summary    18 Mar 2019 07:01  -  19 Mar 2019 07:00  --------------------------------------------------------  IN: 460 mL / OUT: 0 mL / NET: 460 mL      GENERAL: NAD, well-developed  HEAD:  Atraumatic, Normocephalic  EYES: EOMI, PERRLA, conjunctiva and sclera clear  NECK: Supple, No JVD  CHEST/LUNG: Clear to auscultation bilaterally; No wheeze  HEART: Regular rate and rhythm; No murmurs, rubs, or gallops  ABDOMEN: Soft, Nontender, Nondistended; Bowel sounds present  EXTREMITIES:  2+ Peripheral Pulses, No clubbing, cyanosis, or edema  PSYCH: AAOx3  NEUROLOGY: Mild weakness on the right side.  SKIN: No rashes or lesions    LABS:  CAPILLARY BLOOD GLUCOSE      POCT Blood Glucose.: 166 mg/dL (19 Mar 2019 21:23)  POCT Blood Glucose.: 142 mg/dL (19 Mar 2019 17:27)  POCT Blood Glucose.: 123 mg/dL (19 Mar 2019 12:31)  POCT Blood Glucose.: 155 mg/dL (19 Mar 2019 08:02)                          13.8   8.05  )-----------( 200      ( 19 Mar 2019 12:20 )             42.3     03-19    139  |  102  |  21  ----------------------------<  176<H>  4.5   |  21<L>  |  0.87    Ca    10.0      19 Mar 2019 07:03      PT/INR - ( 19 Mar 2019 17:27 )   PT: 12.6 sec;   INR: 1.10 ratio         PTT - ( 19 Mar 2019 17:27 )  PTT:32.1 sec  CARDIAC MARKERS ( 18 Mar 2019 13:41 )  x     / x     / 82 U/L / x     / x              RADIOLOGY & ADDITIONAL TESTS:    Imaging Personally Reviewed:    Consultant(s) Notes Reviewed:      Care Discussed with Consultants/Other Providers:

## 2019-03-25 NOTE — PROGRESS NOTE ADULT - REASON FOR ADMISSION
b/l SDH
3/20/19 s/p bilateral craniotomies for evacuation of SDHs
b/l SDH

## 2019-03-25 NOTE — DISCHARGE NOTE PROVIDER - NSDCCPCAREPLAN_GEN_ALL_CORE_FT
PRINCIPAL DISCHARGE DIAGNOSIS  Diagnosis: Subdural hematoma  Assessment and Plan of Treatment: 3/20/19 s/p bilateral craniotomies for evacuation of SDHs (subdural hematomas)  Dr Xavier- neurosurgeon- please call office for appointment next week. staple removal at that time. do not restart eliquis or other blood thinners until cleared by surgeon.      SECONDARY DISCHARGE DIAGNOSES  Diagnosis: Radius fracture  Assessment and Plan of Treatment: Cast in place, non weight bearing as instructed. Cast precautions as instructed- do not get wet.   please follow up with Dr Lanza in 2-4 weeks.    Diagnosis: Benign essential hypertension  Assessment and Plan of Treatment: Benign essential hypertension  Please continue medications as instructed and follow up with Dr Avery in 1-2 weeks. do not restart eliquis or other blood thinners until cleared by surgeon.    Diagnosis: Controlled type 2 diabetes mellitus without complication, without long-term current use of insulin  Assessment and Plan of Treatment: Please continue diabetic medications and follow up with your primary care physician and/or endocrinologist.    Diagnosis: Chronic atrial fibrillation  Assessment and Plan of Treatment: Chronic atrial fibrillation  continue sotalol but NOT eliquis or blood thinners until cleared by surgeon and follow up with your cardiolgist and/or primary care physician within 1-2 weeks.

## 2019-03-25 NOTE — DISCHARGE NOTE PROVIDER - NSDCFUADDINST_GEN_ALL_CORE_FT
please notify physician if fevers, bleeding, swelling, pain not relieved by medication, increased sluggishness or irritability, increased nausea or vomiting, inability to tolerate foods or liquids.   please do not engage in strenuous activity, heavy lifting, drive, or return to work or school until cleared by surgeon.   please keep incision clean and dry, do not submerge wound in water for prolonged periods of time, pat dry after showering, and do not use any creams or ointments to incision.   do not resume eliquis or other blood thinners until cleared by neurosurgeon

## 2019-03-26 ENCOUNTER — INBOUND DOCUMENT (OUTPATIENT)
Age: 79
End: 2019-03-26

## 2019-03-26 LAB
CULTURE RESULTS: SIGNIFICANT CHANGE UP
SPECIMEN SOURCE: SIGNIFICANT CHANGE UP

## 2019-03-27 ENCOUNTER — EMERGENCY (EMERGENCY)
Facility: HOSPITAL | Age: 79
LOS: 1 days | Discharge: HOME | End: 2019-03-27
Attending: EMERGENCY MEDICINE
Payer: COMMERCIAL

## 2019-03-27 VITALS
OXYGEN SATURATION: 97 % | SYSTOLIC BLOOD PRESSURE: 150 MMHG | DIASTOLIC BLOOD PRESSURE: 73 MMHG | RESPIRATION RATE: 18 BRPM | TEMPERATURE: 98 F | HEART RATE: 61 BPM

## 2019-03-27 VITALS
SYSTOLIC BLOOD PRESSURE: 157 MMHG | HEART RATE: 60 BPM | OXYGEN SATURATION: 99 % | RESPIRATION RATE: 18 BRPM | DIASTOLIC BLOOD PRESSURE: 76 MMHG | HEIGHT: 67 IN | WEIGHT: 177.91 LBS

## 2019-03-27 DIAGNOSIS — Z98.890 OTHER SPECIFIED POSTPROCEDURAL STATES: Chronic | ICD-10-CM

## 2019-03-27 DIAGNOSIS — Z90.79 ACQUIRED ABSENCE OF OTHER GENITAL ORGAN(S): Chronic | ICD-10-CM

## 2019-03-27 LAB
ALBUMIN SERPL ELPH-MCNC: 3.4 G/DL — SIGNIFICANT CHANGE UP (ref 3.3–5)
ALP SERPL-CCNC: 75 U/L — SIGNIFICANT CHANGE UP (ref 40–120)
ALT FLD-CCNC: 15 U/L — SIGNIFICANT CHANGE UP (ref 10–45)
ANION GAP SERPL CALC-SCNC: 10 MMOL/L — SIGNIFICANT CHANGE UP (ref 5–17)
APPEARANCE UR: CLEAR — SIGNIFICANT CHANGE UP
AST SERPL-CCNC: 15 U/L — SIGNIFICANT CHANGE UP (ref 10–40)
BACTERIA # UR AUTO: NEGATIVE — SIGNIFICANT CHANGE UP
BASOPHILS # BLD AUTO: 0 K/UL — SIGNIFICANT CHANGE UP (ref 0–0.2)
BASOPHILS NFR BLD AUTO: 0.3 % — SIGNIFICANT CHANGE UP (ref 0–2)
BILIRUB SERPL-MCNC: 0.5 MG/DL — SIGNIFICANT CHANGE UP (ref 0.2–1.2)
BILIRUB UR-MCNC: NEGATIVE — SIGNIFICANT CHANGE UP
BUN SERPL-MCNC: 20 MG/DL — SIGNIFICANT CHANGE UP (ref 7–23)
CALCIUM SERPL-MCNC: 9.1 MG/DL — SIGNIFICANT CHANGE UP (ref 8.4–10.5)
CHLORIDE SERPL-SCNC: 102 MMOL/L — SIGNIFICANT CHANGE UP (ref 96–108)
CO2 SERPL-SCNC: 24 MMOL/L — SIGNIFICANT CHANGE UP (ref 22–31)
COLOR SPEC: COLORLESS — SIGNIFICANT CHANGE UP
CREAT SERPL-MCNC: 0.94 MG/DL — SIGNIFICANT CHANGE UP (ref 0.5–1.3)
DIFF PNL FLD: NEGATIVE — SIGNIFICANT CHANGE UP
EOSINOPHIL # BLD AUTO: 0.1 K/UL — SIGNIFICANT CHANGE UP (ref 0–0.5)
EOSINOPHIL NFR BLD AUTO: 1.8 % — SIGNIFICANT CHANGE UP (ref 0–6)
EPI CELLS # UR: 0 /HPF — SIGNIFICANT CHANGE UP
GLUCOSE SERPL-MCNC: 180 MG/DL — HIGH (ref 70–99)
GLUCOSE UR QL: NEGATIVE — SIGNIFICANT CHANGE UP
HCT VFR BLD CALC: 35.6 % — LOW (ref 39–50)
HGB BLD-MCNC: 12.1 G/DL — LOW (ref 13–17)
HYALINE CASTS # UR AUTO: 0 /LPF — SIGNIFICANT CHANGE UP (ref 0–2)
KETONES UR-MCNC: NEGATIVE — SIGNIFICANT CHANGE UP
LEUKOCYTE ESTERASE UR-ACNC: NEGATIVE — SIGNIFICANT CHANGE UP
LYMPHOCYTES # BLD AUTO: 0.7 K/UL — LOW (ref 1–3.3)
LYMPHOCYTES # BLD AUTO: 10.3 % — LOW (ref 13–44)
MCHC RBC-ENTMCNC: 31.9 PG — SIGNIFICANT CHANGE UP (ref 27–34)
MCHC RBC-ENTMCNC: 34 GM/DL — SIGNIFICANT CHANGE UP (ref 32–36)
MCV RBC AUTO: 93.9 FL — SIGNIFICANT CHANGE UP (ref 80–100)
MONOCYTES # BLD AUTO: 0.6 K/UL — SIGNIFICANT CHANGE UP (ref 0–0.9)
MONOCYTES NFR BLD AUTO: 8.4 % — SIGNIFICANT CHANGE UP (ref 2–14)
NEUTROPHILS # BLD AUTO: 5.7 K/UL — SIGNIFICANT CHANGE UP (ref 1.8–7.4)
NEUTROPHILS NFR BLD AUTO: 79.2 % — HIGH (ref 43–77)
NITRITE UR-MCNC: NEGATIVE — SIGNIFICANT CHANGE UP
PH UR: 6.5 — SIGNIFICANT CHANGE UP (ref 5–8)
PLATELET # BLD AUTO: 260 K/UL — SIGNIFICANT CHANGE UP (ref 150–400)
POTASSIUM SERPL-MCNC: 4 MMOL/L — SIGNIFICANT CHANGE UP (ref 3.5–5.3)
POTASSIUM SERPL-SCNC: 4 MMOL/L — SIGNIFICANT CHANGE UP (ref 3.5–5.3)
PROT SERPL-MCNC: 5.9 G/DL — LOW (ref 6–8.3)
PROT UR-MCNC: NEGATIVE — SIGNIFICANT CHANGE UP
RBC # BLD: 3.79 M/UL — LOW (ref 4.2–5.8)
RBC # FLD: 11.7 % — SIGNIFICANT CHANGE UP (ref 10.3–14.5)
RBC CASTS # UR COMP ASSIST: 1 /HPF — SIGNIFICANT CHANGE UP (ref 0–4)
SODIUM SERPL-SCNC: 136 MMOL/L — SIGNIFICANT CHANGE UP (ref 135–145)
SP GR SPEC: 1.01 — SIGNIFICANT CHANGE UP (ref 1.01–1.02)
UROBILINOGEN FLD QL: NEGATIVE — SIGNIFICANT CHANGE UP
WBC # BLD: 7.2 K/UL — SIGNIFICANT CHANGE UP (ref 3.8–10.5)
WBC # FLD AUTO: 7.2 K/UL — SIGNIFICANT CHANGE UP (ref 3.8–10.5)
WBC UR QL: 0 /HPF — SIGNIFICANT CHANGE UP (ref 0–5)

## 2019-03-27 PROCEDURE — 99284 EMERGENCY DEPT VISIT MOD MDM: CPT | Mod: 25

## 2019-03-27 PROCEDURE — 93005 ELECTROCARDIOGRAM TRACING: CPT

## 2019-03-27 PROCEDURE — 80053 COMPREHEN METABOLIC PANEL: CPT

## 2019-03-27 PROCEDURE — 70450 CT HEAD/BRAIN W/O DYE: CPT | Mod: 26

## 2019-03-27 PROCEDURE — 70450 CT HEAD/BRAIN W/O DYE: CPT

## 2019-03-27 PROCEDURE — 82962 GLUCOSE BLOOD TEST: CPT

## 2019-03-27 PROCEDURE — 93010 ELECTROCARDIOGRAM REPORT: CPT

## 2019-03-27 PROCEDURE — 85027 COMPLETE CBC AUTOMATED: CPT

## 2019-03-27 PROCEDURE — 81001 URINALYSIS AUTO W/SCOPE: CPT

## 2019-03-27 NOTE — CONSULT NOTE ADULT - SUBJECTIVE AND OBJECTIVE BOX
p ([id number])     HPI:  77yo male history of afib w/ AICD, multiple falls since January with acute on chronic SDH, s/p bilateral craniotomy/evacuation of SDH on 3/20/19 returns to ER for fall due to continued gait instability. Denies head injury, cp, sob, lightheadedness, dizziness, n/v, fevers    Imaging:    Exam:  AOx3, FC, PERRL, EOMI, no facial   5/5 throughout, no drift  SILT  no clonus    --Anticoagulation:    =====================  PAST MEDICAL HISTORY   Urinary urgency  Urinary frequency  HLD (hyperlipidemia)  Overactive bladder  HTN (hypertension)  Type 2 diabetes mellitus    PAST SURGICAL HISTORY   H/O colonoscopy with polypectomy  History of prostate surgery  S/P TURP (status post transurethral resection of prostate)  H/O left inguinal hernia repair        MEDICATIONS:  Antibiotics:    Neuro:    Other:      SOCIAL HISTORY:   Occupation:   Marital Status:     FAMILY HISTORY:  Family history of colon cancer      ROS: Negative except per HPI    LABS:                          12.1   7.2   )-----------( 260      ( 27 Mar 2019 09:10 )             35.6     03-27    136  |  102  |  20  ----------------------------<  180<H>  4.0   |  24  |  0.94    Ca    9.1      27 Mar 2019 09:10    TPro  5.9<L>  /  Alb  3.4  /  TBili  0.5  /  DBili  x   /  AST  15  /  ALT  15  /  AlkPhos  75  03-27

## 2019-03-27 NOTE — ED PROVIDER NOTE - NSFOLLOWUPINSTRUCTIONS_ED_ALL_ED_FT
You were seen in the emergency department for weakness. Your examination, with lab work, CAT scan were reassuring. Follow up with her neurosurgeon as soon as possible for further evaluation. Return to the emergency department if you any or concerning symptoms to severe pain, weakness, or any other concerning symptoms.

## 2019-03-27 NOTE — ED PROVIDER NOTE - ATTENDING CONTRIBUTION TO CARE
77 y/o male with the above documented history and HPI who on exam appears well and comfortable. VSs noted, head c/ CDI staples incisions, EOMsI, neck supple, lungs CTA, cardiac sounds s/ audible m/r/g, abdomen soft, NT/ND, extremities s/ asymmetry, skin s/ rash and neurologically intact. Appropriate screening studies obtained. Just discharged, we will inform NS of his return. Will follow his studies, NS recs and treat/dispo accordingly.

## 2019-03-27 NOTE — CONSULT NOTE ADULT - ASSESSMENT
77yo male history of afib w/ AICD, multiple falls since January with acute on chronic SDH, s/p bilateral craniotomy/evacuation of SDH on 3/20/19 returns to ER for fall due to continued gait instability with CT showing decreased SDH and patient at neurologic baseline  - Family still does not want patient to go to rehab and prefers home PT  - May f/u as outpatient with Dr. Xavier as scheduled

## 2019-03-27 NOTE — ED ADULT NURSE NOTE - OBJECTIVE STATEMENT
78YOM pmh afib, DM2, HTN, HLD, brain surgery 2 weeks ago BIBE ss/p fall this morning. Per pt he felt weak and "lost balance" 78YOM pmh afib, DM2, HTN, HLD, SDH s/p brain surgery 2 weeks ago BIBE ss/p fall this morning. Per pt he felt weak and "lost balance." Wife at bedside. Pt placed on cardiac monitor, EKG done. A&Ox3. Denies HA, dizziness, fever, chills, N/V/D, abd pain, burning upon urination. Safety and comfort measures provided. Will continue to monitor.

## 2019-03-27 NOTE — PROCEDURE NOTE - ADDITIONAL PROCEDURE DETAILS
Indication for interrogation: S/P multiple falls since January   Measured data WNL, Normal pacing and sensing thresholds. Lead impedence stable  Pt is Atrially dependent  No stored data for review. No events to correlate with h/o falls  Changes made: none  Normal ICD functioning

## 2019-03-27 NOTE — ED ADULT NURSE NOTE - NSIMPLEMENTINTERV_GEN_ALL_ED
Implemented All Fall with Harm Risk Interventions:  [place] to call system. Call bell, personal items and telephone within reach. Instruct patient to call for assistance. Room bathroom lighting operational. Non-slip footwear when patient is off stretcher. Physically safe environment: no spills, clutter or unnecessary equipment. Stretcher in lowest position, wheels locked, appropriate side rails in place. Provide visual cue, wrist band, yellow gown, etc. Monitor gait and stability. Monitor for mental status changes and reorient to person, place, and time. Review medications for side effects contributing to fall risk. Reinforce activity limits and safety measures with patient and family. Provide visual clues: red socks.

## 2019-03-27 NOTE — ED PROVIDER NOTE - OBJECTIVE STATEMENT
79yo male history of afib w/ AICD, multiple falls since January with acute on chronic SDH, s/p bilateral craniotomy/evacuation of SDH on 3/20/19 p/w generalized weakness and falling due to weakness. Denies head injury, cp, sob, lightheadedness, dizziness, n/v, fevers.

## 2019-04-02 ENCOUNTER — APPOINTMENT (OUTPATIENT)
Dept: NEUROSURGERY | Facility: CLINIC | Age: 79
End: 2019-04-02

## 2019-04-03 ENCOUNTER — APPOINTMENT (OUTPATIENT)
Dept: NEUROSURGERY | Facility: CLINIC | Age: 79
End: 2019-04-03
Payer: MEDICARE

## 2019-04-03 ENCOUNTER — INBOUND DOCUMENT (OUTPATIENT)
Age: 79
End: 2019-04-03

## 2019-04-03 VITALS
HEIGHT: 67 IN | SYSTOLIC BLOOD PRESSURE: 124 MMHG | WEIGHT: 178 LBS | BODY MASS INDEX: 27.94 KG/M2 | DIASTOLIC BLOOD PRESSURE: 75 MMHG | HEART RATE: 60 BPM

## 2019-04-03 DIAGNOSIS — R29.6 REPEATED FALLS: ICD-10-CM

## 2019-04-03 PROCEDURE — 99024 POSTOP FOLLOW-UP VISIT: CPT

## 2019-04-03 NOTE — PHYSICAL EXAM
[General Appearance - Alert] : alert [General Appearance - In No Acute Distress] : in no acute distress [General Appearance - Well Nourished] : well nourished [General Appearance - Well Developed] : well developed [Clean] : clean [Dry] : dry [Healing Well] : healing well [Staple Intact] : closed with intact staples [No Drainage] : without drainage [Normal Skin] : normal [Oriented To Time, Place, And Person] : oriented to person, place, and time [Impaired Insight] : insight and judgment were intact [Affect] : the affect was normal [Mood] : the mood was normal [Cranial Nerves Optic (II)] : visual acuity intact bilaterally,  pupils equal round and reactive to light [Cranial Nerves Oculomotor (III)] : extraocular motion intact [Cranial Nerves Trigeminal (V)] : facial sensation intact symmetrically [Cranial Nerves Facial (VII)] : face symmetrical [Cranial Nerves Vestibulocochlear (VIII)] : hearing was intact bilaterally [Cranial Nerves Glossopharyngeal (IX)] : tongue and palate midline [Cranial Nerves Accessory (XI - Cranial And Spinal)] : head turning and shoulder shrug symmetric [Cranial Nerves Hypoglossal (XII)] : there was no tongue deviation with protrusion [Motor Strength] : muscle strength was normal in all four extremities [Sensation Tactile Decrease] : light touch was intact [Balance] : balance was intact [Normal] : normal [Intact] : all sensory within normal limits bilaterally [PERRL With Normal Accommodation] : pupils were equal in size, round, reactive to light, with normal accommodation [Hearing Threshold Finger Rub Not [place]] : hearing was normal [Neck Appearance] : the appearance of the neck was normal [] : no respiratory distress [Respiration, Rhythm And Depth] : normal respiratory rhythm and effort [Edema] : there was no peripheral edema [Involuntary Movements] : no involuntary movements were seen [Motor Tone] : muscle strength and tone were normal [Skin Color & Pigmentation] : normal skin color and pigmentation [Erythema] : not erythematous [Warm] : not warm [Romberg's Sign] : Romberg's sign was negtive [Limited Balance] : balance was intact [Tremor] : no tremor present [FreeTextEntry8] : Wide Based gait, steady [FreeTextEntry1] : WBOS gait, steady, amb with walker

## 2019-04-03 NOTE — HISTORY OF PRESENT ILLNESS
[FreeTextEntry1] : Mr. Rick is a 77 yo male with PMH of afib (Eliquis on hold), and multiple falls since 1/2019, who arrives for initial post op visit s/p 3/20/19 Bilateral craniotomies for SDH acute on chronic evacuations.  The surgical incisions are clean, dry, well approximated, staples intact, no signs of irritation, erythema, warmth, or drainage present.  He is feeling well, is full strength, ambulating with a walker for unsteady gait with proximal thigh fatigue and fear of falling again.  He denies headaches, seizure, nausea, vomiting, fever, chest pain, palpitations, shortness of breath, visual, hearing, or speech disturbances.

## 2019-04-03 NOTE — ASSESSMENT
[FreeTextEntry1] : Mr. Rick is a 79 yo male with Afib, and multiple falls, recovering well from recent 3/20/19 Bilateral craniotomies for SDH evacuation.  Neurology referral provided for proximal thigh weakness and falls.  Eliquis on hold with repeat CT to be done 4/16/19 and reassess at that time.  Reviewed the signs and symptoms of stroke to report immediately, and importance of schedule follow up on 4/16.\par \par 1)  CT head wo - to be done 4/16/19 \par 2)  RTO 4/16/19 with CT done\par 3)  Neurology referral - Dr. Jacques\par Eliquis on hold until 4/16 reassessment

## 2019-04-03 NOTE — REVIEW OF SYSTEMS
[Frequent Falls] : frequent falls [As noted in HPI] : as noted in HPI [As Noted in HPI] : as noted in HPI [Negative] : Endocrine [Abdominal Pain] : no abdominal pain [Vomiting] : no vomiting [Diarrhea] : no diarrhea [Dysuria] : no dysuria [Incontinence] : no incontinence [Skin Lesions] : no skin lesions [Skin Wound] : no skin wound [Itching] : no itching [Easy Bleeding] : no tendency for easy bleeding [Easy Bruising] : no tendency for easy bruising [de-identified] : Wide based gait, full strength, negative Romberg's [FreeTextEntry5] : Afib

## 2019-04-04 ENCOUNTER — APPOINTMENT (OUTPATIENT)
Dept: ELECTROPHYSIOLOGY | Facility: CLINIC | Age: 79
End: 2019-04-04
Payer: MEDICARE

## 2019-04-04 ENCOUNTER — NON-APPOINTMENT (OUTPATIENT)
Age: 79
End: 2019-04-04

## 2019-04-04 ENCOUNTER — APPOINTMENT (OUTPATIENT)
Dept: CARDIOLOGY | Facility: CLINIC | Age: 79
End: 2019-04-04
Payer: MEDICARE

## 2019-04-04 VITALS
BODY MASS INDEX: 28.25 KG/M2 | HEIGHT: 67 IN | DIASTOLIC BLOOD PRESSURE: 77 MMHG | SYSTOLIC BLOOD PRESSURE: 143 MMHG | HEART RATE: 60 BPM | WEIGHT: 180 LBS | OXYGEN SATURATION: 100 %

## 2019-04-04 PROCEDURE — 93000 ELECTROCARDIOGRAM COMPLETE: CPT

## 2019-04-04 PROCEDURE — 93283 PRGRMG EVAL IMPLANTABLE DFB: CPT

## 2019-04-04 PROCEDURE — 99215 OFFICE O/P EST HI 40 MIN: CPT | Mod: 25

## 2019-04-04 RX ORDER — CELECOXIB 200 MG/1
200 CAPSULE ORAL DAILY
Refills: 0 | Status: DISCONTINUED | COMMUNITY
End: 2019-04-04

## 2019-04-04 RX ORDER — APIXABAN 5 MG/1
5 TABLET, FILM COATED ORAL
Qty: 60 | Refills: 2 | Status: DISCONTINUED | COMMUNITY
Start: 2019-02-08 | End: 2019-04-04

## 2019-04-04 NOTE — DISCUSSION/SUMMARY
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 \par #1 HOCM Apical- [place] ICD, dizziness resolved\par #2 Afib- on sotalol, eliquis on hold\par #3 DM- HbA1c 7.4% \par #4 Htn- off  Accupril 40mg, on amlodipine\par #5 Lipids- on atorvastatin\par #6 Neuro- s/p surgery, repeat CT 4/16/19\par #6 General- increase PT on shoulder, We discussed adherence to a low fat low cholesterol, ADA diet, weight loss and regular daily exercise.\par \par \par

## 2019-04-04 NOTE — PHYSICAL EXAM
[General Appearance - Well Developed] : well developed [Normal Appearance] : normal appearance [Well Groomed] : well groomed [General Appearance - Well Nourished] : well nourished [No Deformities] : no deformities [General Appearance - In No Acute Distress] : no acute distress [Normal Conjunctiva] : the conjunctiva exhibited no abnormalities [Eyelids - No Xanthelasma] : the eyelids demonstrated no xanthelasmas [Normal Oral Mucosa] : normal oral mucosa [No Oral Pallor] : no oral pallor [No Oral Cyanosis] : no oral cyanosis [Normal Jugular Venous A Waves Present] : normal jugular venous A waves present [Normal Jugular Venous V Waves Present] : normal jugular venous V waves present [No Jugular Venous Treadwell A Waves] : no jugular venous treadwell A waves [Respiration, Rhythm And Depth] : normal respiratory rhythm and effort [Exaggerated Use Of Accessory Muscles For Inspiration] : no accessory muscle use [Auscultation Breath Sounds / Voice Sounds] : lungs were clear to auscultation bilaterally [Heart Rate And Rhythm] : heart rate and rhythm were normal [Heart Sounds] : normal S1 and S2 [Murmurs] : no murmurs present [Abdomen Soft] : soft [Abdomen Tenderness] : non-tender [Abdomen Mass (___ Cm)] : no abdominal mass palpated [Abnormal Walk] : normal gait [Gait - Sufficient For Exercise Testing] : the gait was sufficient for exercise testing [Nail Clubbing] : no clubbing of the fingernails [Cyanosis, Localized] : no localized cyanosis [Petechial Hemorrhages (___cm)] : no petechial hemorrhages [Skin Color & Pigmentation] : normal skin color and pigmentation [] : no rash [No Venous Stasis] : no venous stasis [Skin Lesions] : no skin lesions [No Skin Ulcers] : no skin ulcer [No Xanthoma] : no  xanthoma was observed [Oriented To Time, Place, And Person] : oriented to person, place, and time [Affect] : the affect was normal [Mood] : the mood was normal [No Anxiety] : not feeling anxious [FreeTextEntry1] : shaved head, staples on right skull

## 2019-04-04 NOTE — HISTORY OF PRESENT ILLNESS
[FreeTextEntry1] : Jeanne is a 78-year-old gentleman DM, Htn, Hyperlipidemia, s/p hospitalization after syncope with dislocation shoulder found to be in rapid afib. DC cardioversion. ECHO apical HOCM confirmed by cardiac MRI. ICD implanted and d/c on sotalol. He is a retired physician. \par \par Now s/p hospitalization for subdural hematoma requiring surgery. He is now off eliquis. Repeat CT 4/16/19. He is in a wheelchair. He was inappropriately shocked and adjustments were made to his device.

## 2019-04-09 ENCOUNTER — OTHER (OUTPATIENT)
Age: 79
End: 2019-04-09

## 2019-04-10 ENCOUNTER — APPOINTMENT (OUTPATIENT)
Age: 79
End: 2019-04-10
Payer: MEDICARE

## 2019-04-10 PROCEDURE — 99203 OFFICE O/P NEW LOW 30 MIN: CPT

## 2019-04-10 PROCEDURE — 73110 X-RAY EXAM OF WRIST: CPT | Mod: RT

## 2019-04-12 NOTE — DISCUSSION/SUMMARY
[de-identified] : 77 yo M post casting RT  FX 3/17/19 \par P: \par - Continue NOM in SAC \par - F/U in 3 weeks with XR and possible cast removal

## 2019-04-12 NOTE — PHYSICAL EXAM
[de-identified] : Physical exam right wrist \par well padded SAC in place \par SILT \par able to move fingers/ thumb \par Brisk cap refill  [de-identified] : 3 views right wrist taken today and reviewed by me show healing Distal radius fracture with no change compared to prior films post casting.

## 2019-04-12 NOTE — HISTORY OF PRESENT ILLNESS
[de-identified] : 77 yo M S/P casting Rt distal radius fracture 3/17/19  post craniotomy by neurosurgery for Subdural hematoma after fall.  Presents today for F/U RT DR Bowden

## 2019-04-15 ENCOUNTER — FORM ENCOUNTER (OUTPATIENT)
Age: 79
End: 2019-04-15

## 2019-04-16 ENCOUNTER — OUTPATIENT (OUTPATIENT)
Dept: OUTPATIENT SERVICES | Facility: HOSPITAL | Age: 79
LOS: 1 days | End: 2019-04-16
Payer: COMMERCIAL

## 2019-04-16 ENCOUNTER — APPOINTMENT (OUTPATIENT)
Dept: NEUROSURGERY | Facility: CLINIC | Age: 79
End: 2019-04-16
Payer: MEDICARE

## 2019-04-16 ENCOUNTER — APPOINTMENT (OUTPATIENT)
Dept: CT IMAGING | Facility: CLINIC | Age: 79
End: 2019-04-16
Payer: MEDICARE

## 2019-04-16 VITALS
WEIGHT: 180 LBS | HEIGHT: 67 IN | DIASTOLIC BLOOD PRESSURE: 80 MMHG | HEART RATE: 67 BPM | SYSTOLIC BLOOD PRESSURE: 156 MMHG | BODY MASS INDEX: 28.25 KG/M2

## 2019-04-16 DIAGNOSIS — Z98.890 OTHER SPECIFIED POSTPROCEDURAL STATES: Chronic | ICD-10-CM

## 2019-04-16 DIAGNOSIS — Z00.8 ENCOUNTER FOR OTHER GENERAL EXAMINATION: ICD-10-CM

## 2019-04-16 DIAGNOSIS — Z90.79 ACQUIRED ABSENCE OF OTHER GENITAL ORGAN(S): Chronic | ICD-10-CM

## 2019-04-16 PROCEDURE — 99024 POSTOP FOLLOW-UP VISIT: CPT

## 2019-04-16 PROCEDURE — 70450 CT HEAD/BRAIN W/O DYE: CPT

## 2019-04-16 PROCEDURE — 70450 CT HEAD/BRAIN W/O DYE: CPT | Mod: 26

## 2019-04-20 LAB
CULTURE RESULTS: SIGNIFICANT CHANGE UP
SPECIMEN SOURCE: SIGNIFICANT CHANGE UP

## 2019-05-01 ENCOUNTER — APPOINTMENT (OUTPATIENT)
Dept: ORTHOPEDIC SURGERY | Facility: CLINIC | Age: 79
End: 2019-05-01
Payer: MEDICARE

## 2019-05-01 PROCEDURE — 73110 X-RAY EXAM OF WRIST: CPT | Mod: RT

## 2019-05-01 PROCEDURE — 99213 OFFICE O/P EST LOW 20 MIN: CPT

## 2019-05-03 NOTE — PHYSICAL EXAM
[de-identified] : 3 views right wrist taken today and reviewed by me show healed Distal radius fracture with no change compared to prior films post casting.   [de-identified] : Physical exam right wrist \par cast removed \par minimal swelling no ecchymosis \par SILT MUR \par NTTP DR \par ROM 30 degrees flexion and extension 5 degrees ulna/radial deviation \par 5/5 AIN PIN UN \par NVID distally able to make a fist

## 2019-05-03 NOTE — HISTORY OF PRESENT ILLNESS
[de-identified] : 79 yo M S/P casting Rt distal radius fracture 3/17/19  post craniotomy by neurosurgery for Subdural hematoma after fall.  Presents today for F/U RT DR Bowden doing well no pain.

## 2019-05-03 NOTE — DISCUSSION/SUMMARY
[de-identified] : 79 yo M post casting RT DR WU 3/17/19 \par P: \par - cast removed \par - Cock up wrist splint for comfort \par - Hand therapy WBAT and ROMAT \par - F/U in 6 weeks

## 2019-05-11 LAB
CULTURE RESULTS: SIGNIFICANT CHANGE UP
SPECIMEN SOURCE: SIGNIFICANT CHANGE UP

## 2019-05-16 ENCOUNTER — FORM ENCOUNTER (OUTPATIENT)
Age: 79
End: 2019-05-16

## 2019-05-17 ENCOUNTER — OUTPATIENT (OUTPATIENT)
Dept: OUTPATIENT SERVICES | Facility: HOSPITAL | Age: 79
LOS: 1 days | End: 2019-05-17
Payer: COMMERCIAL

## 2019-05-17 ENCOUNTER — APPOINTMENT (OUTPATIENT)
Dept: CT IMAGING | Facility: CLINIC | Age: 79
End: 2019-05-17
Payer: MEDICARE

## 2019-05-17 DIAGNOSIS — Z90.79 ACQUIRED ABSENCE OF OTHER GENITAL ORGAN(S): Chronic | ICD-10-CM

## 2019-05-17 DIAGNOSIS — Z98.890 OTHER SPECIFIED POSTPROCEDURAL STATES: ICD-10-CM

## 2019-05-17 DIAGNOSIS — Z98.890 OTHER SPECIFIED POSTPROCEDURAL STATES: Chronic | ICD-10-CM

## 2019-05-17 PROCEDURE — 70450 CT HEAD/BRAIN W/O DYE: CPT

## 2019-05-17 PROCEDURE — 70450 CT HEAD/BRAIN W/O DYE: CPT | Mod: 26

## 2019-05-21 ENCOUNTER — APPOINTMENT (OUTPATIENT)
Dept: NEUROSURGERY | Facility: CLINIC | Age: 79
End: 2019-05-21
Payer: MEDICARE

## 2019-05-21 VITALS
HEIGHT: 67 IN | SYSTOLIC BLOOD PRESSURE: 150 MMHG | DIASTOLIC BLOOD PRESSURE: 76 MMHG | BODY MASS INDEX: 29.03 KG/M2 | HEART RATE: 65 BPM | WEIGHT: 185 LBS

## 2019-05-21 DIAGNOSIS — Z86.79 OTHER SPECIFIED POSTPROCEDURAL STATES: ICD-10-CM

## 2019-05-21 DIAGNOSIS — Z98.890 OTHER SPECIFIED POSTPROCEDURAL STATES: ICD-10-CM

## 2019-05-21 PROCEDURE — 99024 POSTOP FOLLOW-UP VISIT: CPT

## 2019-05-23 NOTE — REVIEW OF SYSTEMS
[Frequent Falls] : frequent falls [As noted in HPI] : as noted in HPI [As Noted in HPI] : as noted in HPI [Negative] : Respiratory [Abdominal Pain] : no abdominal pain [Vomiting] : no vomiting [Diarrhea] : no diarrhea [Dysuria] : no dysuria [Incontinence] : no incontinence [Skin Lesions] : no skin lesions [Skin Wound] : no skin wound [Itching] : no itching [Easy Bleeding] : no tendency for easy bleeding [Easy Bruising] : no tendency for easy bruising [de-identified] : Wide based steady gait, full strength, negative Romberg's [FreeTextEntry5] : Afib

## 2019-05-23 NOTE — HISTORY OF PRESENT ILLNESS
[FreeTextEntry1] : Mr. Rick is a 77 yo male with PMH of afib (Eliquis on hold), AICD, and multiple falls since 1/2019, who arrives for 2nd visit s/p 3/20/19 Bilateral craniotomies for SDH acute on chronic.  The right craniotomy site has a  portion about 2 cm with dry scab formation without drainage or warmth.  The remaining surgical incisions are clean, dry, well approximated, no signs of irritation, erythema, warmth, or drainage present.  He is feeling well, is full strength, ambulation has improved with steady gait.  He denies headaches, seizure, nausea, vomiting, fever, chest pain, palpitations, shortness of breath, visual, hearing, or speech disturbances.

## 2019-05-23 NOTE — PHYSICAL EXAM
[General Appearance - Well Nourished] : well nourished [General Appearance - In No Acute Distress] : in no acute distress [General Appearance - Alert] : alert [General Appearance - Well Developed] : well developed [Healing Well] : healing well [No Drainage] : without drainage [Staple Intact] : closed with intact staples [Normal Skin] : normal [Impaired Insight] : insight and judgment were intact [Oriented To Time, Place, And Person] : oriented to person, place, and time [Mood] : the mood was normal [Affect] : the affect was normal [Cranial Nerves Optic (II)] : visual acuity intact bilaterally,  pupils equal round and reactive to light [Cranial Nerves Oculomotor (III)] : extraocular motion intact [Cranial Nerves Trigeminal (V)] : facial sensation intact symmetrically [Cranial Nerves Facial (VII)] : face symmetrical [Cranial Nerves Glossopharyngeal (IX)] : tongue and palate midline [Cranial Nerves Vestibulocochlear (VIII)] : hearing was intact bilaterally [Cranial Nerves Accessory (XI - Cranial And Spinal)] : head turning and shoulder shrug symmetric [Cranial Nerves Hypoglossal (XII)] : there was no tongue deviation with protrusion [Sensation Tactile Decrease] : light touch was intact [Motor Strength] : muscle strength was normal in all four extremities [Balance] : balance was intact [Normal] : normal [Intact] : all sensory within normal limits bilaterally [PERRL With Normal Accommodation] : pupils were equal in size, round, reactive to light, with normal accommodation [Neck Appearance] : the appearance of the neck was normal [Hearing Threshold Finger Rub Not [place]] : hearing was normal [Respiration, Rhythm And Depth] : normal respiratory rhythm and effort [] : no respiratory distress [Involuntary Movements] : no involuntary movements were seen [Edema] : there was no peripheral edema [Motor Tone] : muscle strength and tone were normal [Skin Color & Pigmentation] : normal skin color and pigmentation [] :  [Mid-Portion] : mid-portion [Erythema] : not erythematous [Warm] : not warm [Romberg's Sign] : Romberg's sign was negtive [Limited Balance] : balance was intact [Tremor] : no tremor present [FreeTextEntry8] : Wide Based gait, steady [FreeTextEntry1] : WBOS gait, steady, amb with walker

## 2019-05-23 NOTE — ASSESSMENT
[FreeTextEntry1] : Mr. Rick is a 77 yo male s/p 3/20/19 Bilateral craniotomies for acute on chronic SDH due to multiple falls with Afib (Eliquis on hold).  Repeat CT shows slight decrease of bilateral SDH.  Right surgical incision has small  area with dry scab formation.  We will repeat a CT in 1 month to evaluate the SDH and he will apply OTC antibiotic ointment BID to right incision.  He knows to report any adverse changes in status including signs and symptoms of stroke, or worsening incision site, fever, swelling, warmth, or drainage.\par \par \par 1)  CT head wo - 1 month\par 2)  RTO 1 month\par 3)  OTC abx ointment BID \par

## 2019-05-24 PROBLEM — Z98.890 S/P EVACUATION OF SUBDURAL HEMATOMA: Status: ACTIVE | Noted: 2019-04-03

## 2019-05-29 ENCOUNTER — APPOINTMENT (OUTPATIENT)
Dept: ORTHOPEDIC SURGERY | Facility: CLINIC | Age: 79
End: 2019-05-29
Payer: MEDICARE

## 2019-05-29 VITALS — BODY MASS INDEX: 29.03 KG/M2 | HEIGHT: 67 IN | WEIGHT: 185 LBS

## 2019-05-29 DIAGNOSIS — S52.501A UNSPECIFIED FRACTURE OF THE LOWER END OF RIGHT RADIUS, INITIAL ENCOUNTER FOR CLOSED FRACTURE: ICD-10-CM

## 2019-05-29 DIAGNOSIS — S52.601A UNSPECIFIED FRACTURE OF THE LOWER END OF RIGHT RADIUS, INITIAL ENCOUNTER FOR CLOSED FRACTURE: ICD-10-CM

## 2019-05-29 PROCEDURE — 99213 OFFICE O/P EST LOW 20 MIN: CPT

## 2019-05-29 PROCEDURE — 73110 X-RAY EXAM OF WRIST: CPT | Mod: RT

## 2019-06-04 PROBLEM — S52.501A RADIUS AND ULNA DISTAL FRACTURE, RIGHT, CLOSED, INITIAL ENCOUNTER: Status: ACTIVE | Noted: 2019-04-10

## 2019-06-22 NOTE — ASSESSMENT
[FreeTextEntry1] : Mr. Rick is a 77 yo male whose repeat CT shows resolution of SDH.  Coumadin was discontinued by cardiology as per pt and wife due to only 30% Afib with frequent falls.  There are no neurosurgical restrictions and family will convey to cardiologist today.  The surgical incisions are well healed, one small scabbed area on right incision which is well approximated with no drainage.  Gait has improved and we provided referral for neurology if needed for evaluation at last visit.  We will reassess wound in 3 months with no imaging required.\par \par 1)  RTO 3 months - wound check - no imaging

## 2019-06-22 NOTE — PHYSICAL EXAM
[General Appearance - Alert] : alert [General Appearance - In No Acute Distress] : in no acute distress [General Appearance - Well Nourished] : well nourished [General Appearance - Well Developed] : well developed [Healing Well] : healing well [Mid-Portion] : mid-portion [No Drainage] : without drainage [Normal Skin] : normal [Oriented To Time, Place, And Person] : oriented to person, place, and time [Impaired Insight] : insight and judgment were intact [Mood] : the mood was normal [Affect] : the affect was normal [Cranial Nerves Oculomotor (III)] : extraocular motion intact [Cranial Nerves Optic (II)] : visual acuity intact bilaterally,  pupils equal round and reactive to light [Cranial Nerves Trigeminal (V)] : facial sensation intact symmetrically [Cranial Nerves Facial (VII)] : face symmetrical [Cranial Nerves Glossopharyngeal (IX)] : tongue and palate midline [Cranial Nerves Accessory (XI - Cranial And Spinal)] : head turning and shoulder shrug symmetric [Cranial Nerves Vestibulocochlear (VIII)] : hearing was intact bilaterally [Cranial Nerves Hypoglossal (XII)] : there was no tongue deviation with protrusion [Motor Strength] : muscle strength was normal in all four extremities [Sensation Tactile Decrease] : light touch was intact [Balance] : balance was intact [Normal] : normal [PERRL With Normal Accommodation] : pupils were equal in size, round, reactive to light, with normal accommodation [Hearing Threshold Finger Rub Not [place]] : hearing was normal [Neck Appearance] : the appearance of the neck was normal [] : no respiratory distress [Respiration, Rhythm And Depth] : normal respiratory rhythm and effort [Involuntary Movements] : no involuntary movements were seen [Edema] : there was no peripheral edema [Motor Tone] : muscle strength and tone were normal [Skin Color & Pigmentation] : normal skin color and pigmentation [Well-Healed] : well-healed [Intact] : intact [Abnormal Walk] : normal gait [Erythema] : not erythematous [Warm] : not warm [Romberg's Sign] : Romberg's sign was negtive [Limited Balance] : balance was intact [Tremor] : no tremor present [FreeTextEntry8] : Wide Based gait, steady [FreeTextEntry1] : WBOS gait, steady - no longer using walker

## 2019-06-22 NOTE — HISTORY OF PRESENT ILLNESS
[FreeTextEntry1] : Mr. Rick is a 77 yo male with PMH of afib (Eliquis on hold), AICD, and multiple falls since 1/2019, who arrives for wound check visit s/p 3/20/19 Bilateral craniotomies for SDH acute on chronic.  The right craniotomy site is well approximated with very small scab formation with no drainage, edema, warmth, irritation, or erythema.  He is feeling well, is full strength, ambulation has improved with report of occasional back pain causing painful gait.    \par He denies headaches, seizure, nausea, vomiting, fever, chest pain, palpitations, shortness of breath, visual, hearing, or speech disturbances.

## 2019-06-22 NOTE — REVIEW OF SYSTEMS
[As noted in HPI] : as noted in HPI [As Noted in HPI] : as noted in HPI [Negative] : Endocrine [Abdominal Pain] : no abdominal pain [Vomiting] : no vomiting [Diarrhea] : no diarrhea [Dysuria] : no dysuria [Incontinence] : no incontinence [Skin Lesions] : no skin lesions [Skin Wound] : no skin wound [Itching] : no itching [Easy Bleeding] : no tendency for easy bleeding [Easy Bruising] : no tendency for easy bruising [de-identified] : Wide based steady gait, full strength, negative Romberg's [FreeTextEntry5] : Afib

## 2019-07-11 ENCOUNTER — APPOINTMENT (OUTPATIENT)
Dept: ELECTROPHYSIOLOGY | Facility: CLINIC | Age: 79
End: 2019-07-11
Payer: MEDICARE

## 2019-07-11 PROCEDURE — 93295 DEV INTERROG REMOTE 1/2/MLT: CPT

## 2019-07-11 PROCEDURE — 93296 REM INTERROG EVL PM/IDS: CPT

## 2019-07-18 ENCOUNTER — NON-APPOINTMENT (OUTPATIENT)
Age: 79
End: 2019-07-18

## 2019-07-18 ENCOUNTER — APPOINTMENT (OUTPATIENT)
Dept: ELECTROPHYSIOLOGY | Facility: CLINIC | Age: 79
End: 2019-07-18
Payer: MEDICARE

## 2019-07-18 ENCOUNTER — APPOINTMENT (OUTPATIENT)
Dept: CARDIOLOGY | Facility: CLINIC | Age: 79
End: 2019-07-18
Payer: MEDICARE

## 2019-07-18 VITALS
HEART RATE: 59 BPM | DIASTOLIC BLOOD PRESSURE: 81 MMHG | OXYGEN SATURATION: 99 % | BODY MASS INDEX: 29.29 KG/M2 | SYSTOLIC BLOOD PRESSURE: 150 MMHG | WEIGHT: 187 LBS

## 2019-07-18 PROCEDURE — 99215 OFFICE O/P EST HI 40 MIN: CPT

## 2019-07-18 PROCEDURE — 93283 PRGRMG EVAL IMPLANTABLE DFB: CPT

## 2019-07-18 NOTE — DISCUSSION/SUMMARY
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 Katz, DANNY ordered\par #3 DM- HbA1c 7.4% \par #4 Htn- off  Accupril 40mg, on amlodipine\par #5 Lipids- on atorvastatin\par #6 Neuro- s/p surgery,no anticoagulation\par #6 General- increase PT on shoulder, ambulate with cane and wheelchair. We discussed adherence to a low fat low cholesterol, ADA diet, weight loss and regular daily exercise.\par \par \par

## 2019-07-18 NOTE — HISTORY OF PRESENT ILLNESS
[FreeTextEntry1] : Jeanne is a 78-year-old gentleman DM, Htn, Hyperlipidemia, s/p hospitalization after syncope with dislocation shoulder found to be in rapid afib. DC cardioversion. ECHO apical HOCM confirmed by cardiac MRI. ICD implanted and d/c on sotalol. He is a retired physician. Subsequent hospitalization for subdural hematoma requiring surgery. He is now off eliquis. Repeat CT 4/16/19. He is in a wheelchair. He was inappropriately shocked and adjustments were made to his device.

## 2019-08-14 ENCOUNTER — OUTPATIENT (OUTPATIENT)
Dept: OUTPATIENT SERVICES | Facility: HOSPITAL | Age: 79
LOS: 1 days | End: 2019-08-14
Payer: COMMERCIAL

## 2019-08-14 ENCOUNTER — APPOINTMENT (OUTPATIENT)
Dept: CV DIAGNOSITCS | Facility: HOSPITAL | Age: 79
End: 2019-08-14

## 2019-08-14 DIAGNOSIS — Z90.79 ACQUIRED ABSENCE OF OTHER GENITAL ORGAN(S): Chronic | ICD-10-CM

## 2019-08-14 DIAGNOSIS — Z98.890 OTHER SPECIFIED POSTPROCEDURAL STATES: Chronic | ICD-10-CM

## 2019-08-14 DIAGNOSIS — I48.0 PAROXYSMAL ATRIAL FIBRILLATION: ICD-10-CM

## 2019-08-14 DIAGNOSIS — I25.10 ATHEROSCLEROTIC HEART DISEASE OF NATIVE CORONARY ARTERY WITHOUT ANGINA PECTORIS: ICD-10-CM

## 2019-08-14 LAB — GLUCOSE BLDC GLUCOMTR-MCNC: 120 MG/DL — HIGH (ref 70–99)

## 2019-08-14 PROCEDURE — 93312 ECHO TRANSESOPHAGEAL: CPT | Mod: 26

## 2019-08-14 PROCEDURE — 93306 TTE W/DOPPLER COMPLETE: CPT | Mod: 26

## 2019-08-14 PROCEDURE — 93312 ECHO TRANSESOPHAGEAL: CPT

## 2019-08-14 PROCEDURE — 82962 GLUCOSE BLOOD TEST: CPT

## 2019-08-14 PROCEDURE — 93306 TTE W/DOPPLER COMPLETE: CPT

## 2019-09-03 ENCOUNTER — APPOINTMENT (OUTPATIENT)
Dept: NEUROSURGERY | Facility: CLINIC | Age: 79
End: 2019-09-03

## 2019-09-25 ENCOUNTER — RX RENEWAL (OUTPATIENT)
Age: 79
End: 2019-09-25

## 2019-09-28 ENCOUNTER — TRANSCRIPTION ENCOUNTER (OUTPATIENT)
Age: 79
End: 2019-09-28

## 2019-09-28 ENCOUNTER — APPOINTMENT (OUTPATIENT)
Dept: CT IMAGING | Facility: CLINIC | Age: 79
End: 2019-09-28
Payer: MEDICARE

## 2019-09-28 ENCOUNTER — OUTPATIENT (OUTPATIENT)
Dept: OUTPATIENT SERVICES | Facility: HOSPITAL | Age: 79
LOS: 1 days | End: 2019-09-28
Payer: COMMERCIAL

## 2019-09-28 DIAGNOSIS — Z98.890 OTHER SPECIFIED POSTPROCEDURAL STATES: Chronic | ICD-10-CM

## 2019-09-28 DIAGNOSIS — Z90.79 ACQUIRED ABSENCE OF OTHER GENITAL ORGAN(S): Chronic | ICD-10-CM

## 2019-09-28 DIAGNOSIS — Z98.890 OTHER SPECIFIED POSTPROCEDURAL STATES: ICD-10-CM

## 2019-09-28 PROCEDURE — 70450 CT HEAD/BRAIN W/O DYE: CPT

## 2019-09-29 ENCOUNTER — FORM ENCOUNTER (OUTPATIENT)
Age: 79
End: 2019-09-29

## 2019-09-30 PROCEDURE — 70450 CT HEAD/BRAIN W/O DYE: CPT | Mod: 26

## 2019-10-02 ENCOUNTER — APPOINTMENT (OUTPATIENT)
Dept: CV DIAGNOSITCS | Facility: HOSPITAL | Age: 79
End: 2019-10-02

## 2019-10-02 ENCOUNTER — APPOINTMENT (OUTPATIENT)
Dept: NEUROSURGERY | Facility: CLINIC | Age: 79
End: 2019-10-02
Payer: MEDICARE

## 2019-10-02 VITALS
WEIGHT: 190 LBS | BODY MASS INDEX: 29.82 KG/M2 | DIASTOLIC BLOOD PRESSURE: 80 MMHG | HEIGHT: 67 IN | SYSTOLIC BLOOD PRESSURE: 142 MMHG | HEART RATE: 67 BPM

## 2019-10-02 DIAGNOSIS — Z51.89 ENCOUNTER FOR OTHER SPECIFIED AFTERCARE: ICD-10-CM

## 2019-10-02 DIAGNOSIS — S06.5X9A TRAUMATIC SUBDURAL HEMORRHAGE WITH LOSS OF CONSCIOUSNESS OF UNSPECIFIED DURATION, INITIAL ENCOUNTER: ICD-10-CM

## 2019-10-02 DIAGNOSIS — Z98.890 OTHER SPECIFIED POSTPROCEDURAL STATES: ICD-10-CM

## 2019-10-02 PROCEDURE — 99213 OFFICE O/P EST LOW 20 MIN: CPT

## 2019-10-07 PROBLEM — Z98.890 S/P CRANIOTOMY: Status: ACTIVE | Noted: 2019-04-03

## 2019-10-07 PROBLEM — Z51.89 VISIT FOR WOUND CHECK: Status: ACTIVE | Noted: 2019-05-21

## 2019-10-07 PROBLEM — S06.5X9A SUBDURAL HEMATOMA: Status: ACTIVE | Noted: 2019-09-25

## 2019-10-11 NOTE — ASSESSMENT
[FreeTextEntry1] : Repeat CT 9/30/19 shows reduced SDH - left parietal now 0.2 cm from 0.5 cm, and Right collection now 0.5 cm from 0.8 cm.  He remains asymptomatic and he denies unsteady gait.  No neurosurgical follow up required at this time.  No contraindications to resuming a/c therapy for afib which was conveyed to cardiologist with Watchman procedure pending.  \par \par 1)  No Nsx f/u

## 2019-10-11 NOTE — PHYSICAL EXAM
[General Appearance - Alert] : alert [General Appearance - In No Acute Distress] : in no acute distress [General Appearance - Well Developed] : well developed [General Appearance - Well Nourished] : well nourished [Well-Healed] : well-healed [No Drainage] : without drainage [Normal Skin] : normal [Oriented To Time, Place, And Person] : oriented to person, place, and time [Impaired Insight] : insight and judgment were intact [Affect] : the affect was normal [Mood] : the mood was normal [Cranial Nerves Oculomotor (III)] : extraocular motion intact [Cranial Nerves Optic (II)] : visual acuity intact bilaterally,  pupils equal round and reactive to light [Cranial Nerves Trigeminal (V)] : facial sensation intact symmetrically [Cranial Nerves Facial (VII)] : face symmetrical [Cranial Nerves Vestibulocochlear (VIII)] : hearing was intact bilaterally [Cranial Nerves Accessory (XI - Cranial And Spinal)] : head turning and shoulder shrug symmetric [Cranial Nerves Glossopharyngeal (IX)] : tongue and palate midline [Cranial Nerves Hypoglossal (XII)] : there was no tongue deviation with protrusion [Motor Strength] : muscle strength was normal in all four extremities [Sensation Tactile Decrease] : light touch was intact [Abnormal Walk] : normal gait [Balance] : balance was intact [Normal] : normal [Intact] : all sensory within normal limits bilaterally [PERRL With Normal Accommodation] : pupils were equal in size, round, reactive to light, with normal accommodation [Hearing Threshold Finger Rub Not [place]] : hearing was normal [Neck Appearance] : the appearance of the neck was normal [] : no respiratory distress [Edema] : there was no peripheral edema [Respiration, Rhythm And Depth] : normal respiratory rhythm and effort [Motor Tone] : muscle strength and tone were normal [Involuntary Movements] : no involuntary movements were seen [Skin Color & Pigmentation] : normal skin color and pigmentation [Antalgic] : antalgic [Erythema] : not erythematous [Warm] : not warm [Romberg's Sign] : Romberg's sign was negtive [Limited Balance] : balance was intact [Tremor] : no tremor present [FreeTextEntry8] : Wide Based gait, steady [FreeTextEntry1] : WBOS gait, steady - no longer using walker

## 2019-10-11 NOTE — REVIEW OF SYSTEMS
[As noted in HPI] : as noted in HPI [As Noted in HPI] : as noted in HPI [Negative] : Endocrine [Abdominal Pain] : no abdominal pain [Vomiting] : no vomiting [Diarrhea] : no diarrhea [Dysuria] : no dysuria [Incontinence] : no incontinence [Skin Lesions] : no skin lesions [Skin Wound] : no skin wound [Itching] : no itching [Easy Bleeding] : no tendency for easy bleeding [Easy Bruising] : no tendency for easy bruising [de-identified] : Wide based steady gait, full strength, negative Romberg's [FreeTextEntry5] : Afib

## 2019-10-11 NOTE — HISTORY OF PRESENT ILLNESS
[FreeTextEntry1] : Mr. Rick is a 79 yo male with PMH of afib (Eliquis on hold), AICD, and multiple falls since 1/2019, who arrives for 3 month f/u and wound check visit s/p 3/20/19 Bilateral craniotomies for SDH acute on chronic. The right craniotomy site is now well healed and well approximated with no signs of erythema, edema, warmth, irritation, or drainage.  He is feeling well, is full strength, ambulation has improved with report of occasional back pain causing painful gait.    \par He denies headaches, seizure, nausea, vomiting, fever, chest pain, palpitations, shortness of breath, visual, hearing, or speech disturbances.

## 2019-10-15 ENCOUNTER — APPOINTMENT (OUTPATIENT)
Dept: NEUROSURGERY | Facility: CLINIC | Age: 79
End: 2019-10-15

## 2019-10-22 ENCOUNTER — APPOINTMENT (OUTPATIENT)
Dept: CARDIOLOGY | Facility: CLINIC | Age: 79
End: 2019-10-22

## 2019-10-22 ENCOUNTER — APPOINTMENT (OUTPATIENT)
Dept: ELECTROPHYSIOLOGY | Facility: CLINIC | Age: 79
End: 2019-10-22

## 2019-10-23 ENCOUNTER — INPATIENT (INPATIENT)
Facility: HOSPITAL | Age: 79
LOS: 0 days | Discharge: HOME | DRG: 274 | End: 2019-10-24
Attending: INTERNAL MEDICINE | Admitting: INTERNAL MEDICINE
Payer: COMMERCIAL

## 2019-10-23 ENCOUNTER — APPOINTMENT (OUTPATIENT)
Dept: CV DIAGNOSITCS | Facility: HOSPITAL | Age: 79
End: 2019-10-23

## 2019-10-23 VITALS
RESPIRATION RATE: 20 BRPM | HEIGHT: 67 IN | SYSTOLIC BLOOD PRESSURE: 181 MMHG | DIASTOLIC BLOOD PRESSURE: 88 MMHG | OXYGEN SATURATION: 98 % | TEMPERATURE: 98 F | HEART RATE: 60 BPM | WEIGHT: 190.04 LBS

## 2019-10-23 DIAGNOSIS — I48.91 UNSPECIFIED ATRIAL FIBRILLATION: ICD-10-CM

## 2019-10-23 DIAGNOSIS — Z98.890 OTHER SPECIFIED POSTPROCEDURAL STATES: Chronic | ICD-10-CM

## 2019-10-23 DIAGNOSIS — Z95.810 PRESENCE OF AUTOMATIC (IMPLANTABLE) CARDIAC DEFIBRILLATOR: Chronic | ICD-10-CM

## 2019-10-23 DIAGNOSIS — Z90.79 ACQUIRED ABSENCE OF OTHER GENITAL ORGAN(S): Chronic | ICD-10-CM

## 2019-10-23 LAB
ALBUMIN SERPL ELPH-MCNC: 4.4 G/DL — SIGNIFICANT CHANGE UP (ref 3.3–5)
ALP SERPL-CCNC: 73 U/L — SIGNIFICANT CHANGE UP (ref 40–120)
ALT FLD-CCNC: 11 U/L — SIGNIFICANT CHANGE UP (ref 10–45)
ANION GAP SERPL CALC-SCNC: 11 MMOL/L — SIGNIFICANT CHANGE UP (ref 5–17)
APTT BLD: 29.9 SEC — SIGNIFICANT CHANGE UP (ref 27.5–36.3)
AST SERPL-CCNC: 14 U/L — SIGNIFICANT CHANGE UP (ref 10–40)
BILIRUB SERPL-MCNC: 0.7 MG/DL — SIGNIFICANT CHANGE UP (ref 0.2–1.2)
BLD GP AB SCN SERPL QL: NEGATIVE — SIGNIFICANT CHANGE UP
BUN SERPL-MCNC: 23 MG/DL — SIGNIFICANT CHANGE UP (ref 7–23)
CALCIUM SERPL-MCNC: 10.1 MG/DL — SIGNIFICANT CHANGE UP (ref 8.4–10.5)
CHLORIDE SERPL-SCNC: 102 MMOL/L — SIGNIFICANT CHANGE UP (ref 96–108)
CO2 SERPL-SCNC: 26 MMOL/L — SIGNIFICANT CHANGE UP (ref 22–31)
CREAT SERPL-MCNC: 1.02 MG/DL — SIGNIFICANT CHANGE UP (ref 0.5–1.3)
GLUCOSE BLDC GLUCOMTR-MCNC: 154 MG/DL — HIGH (ref 70–99)
GLUCOSE BLDC GLUCOMTR-MCNC: 163 MG/DL — HIGH (ref 70–99)
GLUCOSE BLDC GLUCOMTR-MCNC: 189 MG/DL — HIGH (ref 70–99)
GLUCOSE BLDC GLUCOMTR-MCNC: 264 MG/DL — HIGH (ref 70–99)
GLUCOSE SERPL-MCNC: 147 MG/DL — HIGH (ref 70–99)
HCT VFR BLD CALC: 42.5 % — SIGNIFICANT CHANGE UP (ref 39–50)
HCT VFR BLD CALC: 43 % — SIGNIFICANT CHANGE UP (ref 39–50)
HCT VFR BLD CALC: 43.7 % — SIGNIFICANT CHANGE UP (ref 39–50)
HGB BLD-MCNC: 14.2 G/DL — SIGNIFICANT CHANGE UP (ref 13–17)
HGB BLD-MCNC: 14.6 G/DL — SIGNIFICANT CHANGE UP (ref 13–17)
HGB BLD-MCNC: 14.6 G/DL — SIGNIFICANT CHANGE UP (ref 13–17)
INR BLD: 0.93 RATIO — SIGNIFICANT CHANGE UP (ref 0.88–1.16)
INR BLD: 1.05 RATIO — SIGNIFICANT CHANGE UP (ref 0.88–1.16)
MCHC RBC-ENTMCNC: 31.5 PG — SIGNIFICANT CHANGE UP (ref 27–34)
MCHC RBC-ENTMCNC: 31.8 PG — SIGNIFICANT CHANGE UP (ref 27–34)
MCHC RBC-ENTMCNC: 31.9 PG — SIGNIFICANT CHANGE UP (ref 27–34)
MCHC RBC-ENTMCNC: 33.4 GM/DL — SIGNIFICANT CHANGE UP (ref 32–36)
MCHC RBC-ENTMCNC: 33.4 GM/DL — SIGNIFICANT CHANGE UP (ref 32–36)
MCHC RBC-ENTMCNC: 34 GM/DL — SIGNIFICANT CHANGE UP (ref 32–36)
MCV RBC AUTO: 93.9 FL — SIGNIFICANT CHANGE UP (ref 80–100)
MCV RBC AUTO: 94.2 FL — SIGNIFICANT CHANGE UP (ref 80–100)
MCV RBC AUTO: 95.3 FL — SIGNIFICANT CHANGE UP (ref 80–100)
NRBC # BLD: 0 /100 WBCS — SIGNIFICANT CHANGE UP (ref 0–0)
PLATELET # BLD AUTO: 155 K/UL — SIGNIFICANT CHANGE UP (ref 150–400)
PLATELET # BLD AUTO: 172 K/UL — SIGNIFICANT CHANGE UP (ref 150–400)
PLATELET # BLD AUTO: 173 K/UL — SIGNIFICANT CHANGE UP (ref 150–400)
POTASSIUM SERPL-MCNC: 4.1 MMOL/L — SIGNIFICANT CHANGE UP (ref 3.5–5.3)
POTASSIUM SERPL-SCNC: 4.1 MMOL/L — SIGNIFICANT CHANGE UP (ref 3.5–5.3)
PROT SERPL-MCNC: 7 G/DL — SIGNIFICANT CHANGE UP (ref 6–8.3)
PROTHROM AB SERPL-ACNC: 10.7 SEC — SIGNIFICANT CHANGE UP (ref 10–12.9)
PROTHROM AB SERPL-ACNC: 12 SEC — SIGNIFICANT CHANGE UP (ref 10–12.9)
RBC # BLD: 4.46 M/UL — SIGNIFICANT CHANGE UP (ref 4.2–5.8)
RBC # BLD: 4.58 M/UL — SIGNIFICANT CHANGE UP (ref 4.2–5.8)
RBC # BLD: 4.64 M/UL — SIGNIFICANT CHANGE UP (ref 4.2–5.8)
RBC # FLD: 13.1 % — SIGNIFICANT CHANGE UP (ref 10.3–14.5)
RBC # FLD: 13.1 % — SIGNIFICANT CHANGE UP (ref 10.3–14.5)
RBC # FLD: 13.2 % — SIGNIFICANT CHANGE UP (ref 10.3–14.5)
RH IG SCN BLD-IMP: POSITIVE — SIGNIFICANT CHANGE UP
SODIUM SERPL-SCNC: 139 MMOL/L — SIGNIFICANT CHANGE UP (ref 135–145)
WBC # BLD: 12.66 K/UL — HIGH (ref 3.8–10.5)
WBC # BLD: 5.11 K/UL — SIGNIFICANT CHANGE UP (ref 3.8–10.5)
WBC # BLD: 6.81 K/UL — SIGNIFICANT CHANGE UP (ref 3.8–10.5)
WBC # FLD AUTO: 12.66 K/UL — HIGH (ref 3.8–10.5)
WBC # FLD AUTO: 5.11 K/UL — SIGNIFICANT CHANGE UP (ref 3.8–10.5)
WBC # FLD AUTO: 6.81 K/UL — SIGNIFICANT CHANGE UP (ref 3.8–10.5)

## 2019-10-23 PROCEDURE — 33340 PERQ CLSR TCAT L ATR APNDGE: CPT | Mod: Q0

## 2019-10-23 PROCEDURE — 93926 LOWER EXTREMITY STUDY: CPT | Mod: 26,RT

## 2019-10-23 PROCEDURE — 51702 INSERT TEMP BLADDER CATH: CPT

## 2019-10-23 PROCEDURE — 93010 ELECTROCARDIOGRAM REPORT: CPT

## 2019-10-23 PROCEDURE — 93010 ELECTROCARDIOGRAM REPORT: CPT | Mod: 77

## 2019-10-23 PROCEDURE — 93355 ECHO TRANSESOPHAGEAL (TEE): CPT

## 2019-10-23 RX ORDER — ACETAMINOPHEN 500 MG
1000 TABLET ORAL ONCE
Refills: 0 | Status: COMPLETED | OUTPATIENT
Start: 2019-10-23 | End: 2019-10-23

## 2019-10-23 RX ORDER — INSULIN LISPRO 100/ML
VIAL (ML) SUBCUTANEOUS AT BEDTIME
Refills: 0 | Status: DISCONTINUED | OUTPATIENT
Start: 2019-10-23 | End: 2019-10-24

## 2019-10-23 RX ORDER — DEXTROSE 50 % IN WATER 50 %
15 SYRINGE (ML) INTRAVENOUS ONCE
Refills: 0 | Status: DISCONTINUED | OUTPATIENT
Start: 2019-10-23 | End: 2019-10-24

## 2019-10-23 RX ORDER — SODIUM CHLORIDE 9 MG/ML
3 INJECTION INTRAMUSCULAR; INTRAVENOUS; SUBCUTANEOUS EVERY 8 HOURS
Refills: 0 | Status: DISCONTINUED | OUTPATIENT
Start: 2019-10-23 | End: 2019-10-24

## 2019-10-23 RX ORDER — CHLORHEXIDINE GLUCONATE 213 G/1000ML
1 SOLUTION TOPICAL
Refills: 0 | Status: DISCONTINUED | OUTPATIENT
Start: 2019-10-23 | End: 2019-10-24

## 2019-10-23 RX ORDER — DEXTROSE 50 % IN WATER 50 %
25 SYRINGE (ML) INTRAVENOUS ONCE
Refills: 0 | Status: DISCONTINUED | OUTPATIENT
Start: 2019-10-23 | End: 2019-10-24

## 2019-10-23 RX ORDER — ASPIRIN/CALCIUM CARB/MAGNESIUM 324 MG
325 TABLET ORAL DAILY
Refills: 0 | Status: DISCONTINUED | OUTPATIENT
Start: 2019-10-23 | End: 2019-10-24

## 2019-10-23 RX ORDER — AMLODIPINE BESYLATE 2.5 MG/1
1 TABLET ORAL
Qty: 0 | Refills: 0 | DISCHARGE

## 2019-10-23 RX ORDER — PROTAMINE SULFATE 10 MG/ML
30 AMPUL (ML) INTRAVENOUS ONCE
Refills: 0 | Status: COMPLETED | OUTPATIENT
Start: 2019-10-23 | End: 2019-10-23

## 2019-10-23 RX ORDER — GLYBURIDE-METFORMIN HYDROCHLORIDE 1.25; 25 MG/1; MG/1
1 TABLET ORAL
Qty: 0 | Refills: 0 | DISCHARGE

## 2019-10-23 RX ORDER — QUINAPRIL HYDROCHLORIDE 40 MG/1
1 TABLET, FILM COATED ORAL
Qty: 0 | Refills: 0 | DISCHARGE

## 2019-10-23 RX ORDER — GLUCAGON INJECTION, SOLUTION 0.5 MG/.1ML
1 INJECTION, SOLUTION SUBCUTANEOUS ONCE
Refills: 0 | Status: DISCONTINUED | OUTPATIENT
Start: 2019-10-23 | End: 2019-10-24

## 2019-10-23 RX ORDER — AMLODIPINE BESYLATE 2.5 MG/1
10 TABLET ORAL DAILY
Refills: 0 | Status: DISCONTINUED | OUTPATIENT
Start: 2019-10-23 | End: 2019-10-24

## 2019-10-23 RX ORDER — DEXTROSE 50 % IN WATER 50 %
12.5 SYRINGE (ML) INTRAVENOUS ONCE
Refills: 0 | Status: DISCONTINUED | OUTPATIENT
Start: 2019-10-23 | End: 2019-10-24

## 2019-10-23 RX ORDER — PIOGLITAZONE HYDROCHLORIDE 15 MG/1
1 TABLET ORAL
Qty: 0 | Refills: 0 | DISCHARGE

## 2019-10-23 RX ORDER — SOTALOL HCL 120 MG
120 TABLET ORAL
Refills: 0 | Status: DISCONTINUED | OUTPATIENT
Start: 2019-10-23 | End: 2019-10-24

## 2019-10-23 RX ORDER — SODIUM CHLORIDE 9 MG/ML
1000 INJECTION, SOLUTION INTRAVENOUS
Refills: 0 | Status: DISCONTINUED | OUTPATIENT
Start: 2019-10-23 | End: 2019-10-24

## 2019-10-23 RX ORDER — INSULIN LISPRO 100/ML
VIAL (ML) SUBCUTANEOUS
Refills: 0 | Status: DISCONTINUED | OUTPATIENT
Start: 2019-10-23 | End: 2019-10-24

## 2019-10-23 RX ORDER — RIVAROXABAN 15 MG-20MG
20 KIT ORAL
Refills: 0 | Status: DISCONTINUED | OUTPATIENT
Start: 2019-10-23 | End: 2019-10-23

## 2019-10-23 RX ADMIN — Medication 325 MILLIGRAM(S): at 18:15

## 2019-10-23 RX ADMIN — Medication 120 MILLIGRAM(S): at 22:48

## 2019-10-23 RX ADMIN — Medication 1 MILLIGRAM(S): at 22:47

## 2019-10-23 RX ADMIN — SODIUM CHLORIDE 3 MILLILITER(S): 9 INJECTION INTRAMUSCULAR; INTRAVENOUS; SUBCUTANEOUS at 18:18

## 2019-10-23 RX ADMIN — Medication 1: at 18:16

## 2019-10-23 RX ADMIN — AMLODIPINE BESYLATE 10 MILLIGRAM(S): 2.5 TABLET ORAL at 18:15

## 2019-10-23 RX ADMIN — SODIUM CHLORIDE 3 MILLILITER(S): 9 INJECTION INTRAMUSCULAR; INTRAVENOUS; SUBCUTANEOUS at 21:36

## 2019-10-23 RX ADMIN — Medication 212 MILLIGRAM(S): at 13:30

## 2019-10-23 NOTE — CHART NOTE - NSCHARTNOTEFT_GEN_A_CORE
Pt with R groin bleed persistent Dr. Izquierdo at bedside    Pt s/p AF ablation unable to obtain hemostasis of groin attempted to place femstop unable to gain hemostasis.  Continued to hold for additional 40 minutes pt with distended bladder s/c for about 300cc of clear yellow urine.  Dr. Katz called to bedside received 30mg of Protamine and Femstop placed with hemostasis.    Plan:  CCU Dr. Robertson called at bedside  Will transfer to CCU or CCU2 when groin stable for transfer  2nd IV placed 20Fr  Urology called to place julian 16FR placed while on bedrest as pt has OAB  Dr Bermudez will contact CCU fellow for transfer  Pt to remain in CRS Pacu until stable  Cont Tele  Xarelto 20mg tonight if groin stays stable clear with Dr. Izquierdo, EP fellow  Monitor closely    Frances Liriano, [facility]-BC  Cardiology Pt with R groin bleed persistent Dr. Izquierdo at bedside    Pt s/p AF ablation unable to obtain hemostasis of groin attempted to place femstop unable to gain hemostasis.  Continued to hold for additional 40 minutes pt with distended bladder s/c for about 300cc of clear yellow urine.  Dr. Katz called to bedside received 30mg of Protamine and Femstop placed with hemostasis.    Plan:  CCU Dr. Robertson called at bedside  Will transfer to CCU or CCU2 when groin stable for transfer  2nd IV placed 20Fr  CBC with stable H/H  Urology called to place julian 16FR placed while on bedrest as pt has OAB  Dr Bermudez will contact CCU fellow for transfer  Pt to remain in CRS Pacu until stable  Cont Tele  Xarelto 20mg tonight if groin stays stable clear with Dr. Izquierdo, EP fellow  Monitor closely    Frances Liriano, [facility]-BC  Cardiology

## 2019-10-23 NOTE — H&P ADULT - HISTORY OF PRESENT ILLNESS
HPI: 79 year old male patient with pmh of HTN, HLD, atrial variant HCM s/p PPM/ICD [facility] Model #2411-36QDR (1/20/2019 by Dr. Lopez), AF s/p DCCV on Sotalol and Eliquis s/p multiple falls since January of this year with worsening gait over 2-3 weeks found to have R>L B/l acute on chronic SDH s/p [place] hole and Craniotomy 3/2019 (now off Eliquis) with no residual neurological symptoms (walks with a walker), DM2 (last AIc 6.9 ?1 month ago, controlled uncomplicated), non-bacterial proctitis c/b overactive bladder followed by Dr. Avery presented today for elective DANNY/Watchman 2/2 inability to tolerate A/C.     Pt tolerated the procedure well with no complications. While in the recovery suite, pt was noted to have excessive groin bleed for which manual pressure and FemStop were applied for over 2 hours. Vascular surgery was also consulted in the mean time. Pt is being admitted to CCU2 for close monitoring.

## 2019-10-23 NOTE — H&P CARDIOLOGY - PMH
HLD (hyperlipidemia)    HTN (hypertension)    Overactive bladder    Proctitis  Non-bacterial  Type 2 diabetes mellitus    Urinary frequency    Urinary urgency

## 2019-10-23 NOTE — CHART NOTE - NSCHARTNOTEFT_GEN_A_CORE
HPI:  80 y/o male with pmh of HTN, HLD, atrial variant HCM s/p PPM/ICD [facility] Model #2411-36QDR 1/20/2019 (Dr. Lopez), Afib s/p DCCV on Sotalol on Eliquis s/p multiple falls since January of this year with worsening gait over 2-3 weeks found to have R>L B/l acute on chronic SDH s/p [place] hole and Craniotomy 3/2019 (now off Eliquis) with no residual neurological symptoms (walks with a walker), DM2 (last AIc 6.9 ?1 month ago, controlled uncomplicated), non-bacterial proctitis c/b overactive bladder followed by Dr. Avery, Cardiologist and presents today for DANNY/Watchman 2/2 inability to tolerate A/C.  Pt denies any other implantable devices other then his PPM/ICD, dentures and denies cp, sob with occasional Palpitations and no recent falls, lightheadedness or dizziness.      Pt. s/p Watchman via RFe site retention suture in place.  Upon assessment 1150 pt. found to be bleeding from RFe site, blood found under pt with hematoma.  Direct pressure held x 20 minutes Dr. Blackman notified CBC sent EP fellow at bedside to maintain direct pressure. VSS, pt. mentating well.  Pt. restless.  Ongoing support and explanation provided to pt. Continue to monitor

## 2019-10-23 NOTE — H&P CARDIOLOGY - PSH
AICD (automatic cardioverter/defibrillator) present  1/20/2019 Dr. Lopez - [facility] 2411-36QDR  H/O colonoscopy with polypectomy    H/O left inguinal hernia repair  with mesh 15 years ago  History of prostate surgery  s/p prostate microwave therapy  S/P TURP (status post transurethral resection of prostate)

## 2019-10-23 NOTE — CHART NOTE - NSCHARTNOTEFT_GEN_A_CORE
====================  CCU MIDNIGHT ROUNDS  ====================    BETH MAYFIELD  [id number]  Patient is a 79y old  Male who presents with a chief complaint of Watchman Procedure       ====================  SUMMARY:79 year old male patient with pmh of HTN, HLD, atrial variant HCM s/p PPM/ICD [facility] Model #2411-36QDR (1/20/2019 by Dr. Lopez), AF s/p DCCV on Sotalol and Eliquis s/p multiple falls since January of this year with worsening gait over 2-3 weeks found to have R>L B/l acute on chronic SDH s/p [place] hole and Craniotomy 3/2019 (now off Eliquis) with no residual neurological symptoms (walks with a walker), DM2 (last AIc 6.9 ?1 month ago, controlled uncomplicated), non-bacterial proctitis c/b overactive bladder presented today for elective DANNY/Watchman 2/2 inability to tolerate A/C c/b Rt groin bleed  ====================        ====================  NEW EVENTS: no new event  ====================        ====================  VITALS (Last 12 hrs):  ====================    T(C): 36.4 (10-23-19 @ 19:00), Max: 36.7 (10-23-19 @ 18:00)  T(F): 97.6 (10-23-19 @ 19:00), Max: 98 (10-23-19 @ 18:00)  HR: 60 (10-23-19 @ 21:00) (60 - 86)  BP: 133/78 (10-23-19 @ 21:00) (114/59 - 163/80)  BP(mean): 100 (10-23-19 @ 21:00) (83 - 115)  RR: 18 (10-23-19 @ 21:00) (11 - 18)  SpO2: 96% (10-23-19 @ 21:00) (96% - 98%)  -    I&O's Summary    23 Oct 2019 07:01  -  23 Oct 2019 22:10  --------------------------------------------------------  IN: 240 mL / OUT: 250 mL / NET: -10 mL            ====================  NEW LABS:  ====================                          14.6   12.66 )-----------( 173      ( 23 Oct 2019 15:37 )             43.7     10-23    139  |  102  |  23  ----------------------------<  147<H>  4.1   |  26  |  1.02    Ca    10.1      23 Oct 2019 07:01    TPro  7.0  /  Alb  4.4  /  TBili  0.7  /  DBili  x   /  AST  14  /  ALT  11  /  AlkPhos  73  10-23    PT/INR - ( 23 Oct 2019 15:37 )   PT: 12.0 sec;   INR: 1.05 ratio         PTT - ( 23 Oct 2019 07:01 )  PTT:29.9 sec            ====================  PLAN:  ====================  -       Efrain Ohara MD  PGY-1  Internal Medicine-Emergency Medicine Resident  Pager: [phone] ====================  CCU MIDNIGHT ROUNDS  ====================    BETH MAYFIELD  [id number]  Patient is a 79y old  Male who presents with a chief complaint of Watchman Procedure       ====================  SUMMARY:79 year old male patient with pmh of HTN, HLD, atrial variant HCM s/p PPM/ICD [facility] Model #2411-36QDR (1/20/2019 by Dr. Lopez), AF s/p DCCV on Sotalol and Eliquis s/p multiple falls since January of this year with worsening gait over 2-3 weeks found to have R>L B/l acute on chronic SDH s/p [place] hole and Craniotomy 3/2019 (now off Eliquis) with no residual neurological symptoms (walks with a walker), DM2 (last AIc 6.9 ?1 month ago, controlled uncomplicated), non-bacterial proctitis c/b overactive bladder presented today for elective DANNY/Watchman 2/2 inability to tolerate A/C c/b Rt groin bleed  ====================        ====================  NEW EVENTS: no new event  ====================        ====================  VITALS (Last 12 hrs):  ====================    T(C): 36.4 (10-23-19 @ 19:00), Max: 36.7 (10-23-19 @ 18:00)  T(F): 97.6 (10-23-19 @ 19:00), Max: 98 (10-23-19 @ 18:00)  HR: 60 (10-23-19 @ 21:00) (60 - 86)  BP: 133/78 (10-23-19 @ 21:00) (114/59 - 163/80)  BP(mean): 100 (10-23-19 @ 21:00) (83 - 115)  RR: 18 (10-23-19 @ 21:00) (11 - 18)  SpO2: 96% (10-23-19 @ 21:00) (96% - 98%)  -    I&O's Summary    23 Oct 2019 07:01  -  23 Oct 2019 22:10  --------------------------------------------------------  IN: 240 mL / OUT: 250 mL / NET: -10 mL            ====================  NEW LABS:  ====================                          14.6   12.66 )-----------( 173      ( 23 Oct 2019 15:37 )             43.7     10-23    139  |  102  |  23  ----------------------------<  147<H>  4.1   |  26  |  1.02    Ca    10.1      23 Oct 2019 07:01    TPro  7.0  /  Alb  4.4  /  TBili  0.7  /  DBili  x   /  AST  14  /  ALT  11  /  AlkPhos  73  10-23    PT/INR - ( 23 Oct 2019 15:37 )   PT: 12.0 sec;   INR: 1.05 ratio         PTT - ( 23 Oct 2019 07:01 )  PTT:29.9 sec                ====================  PLAN: holding  Xarelto for now. may need right groin U/S to assess hematoma. monitor cbc  ==================== ====================  CCU MIDNIGHT ROUNDS  ====================    BETH MAYFIELD  [id number]  Patient is a 79y old  Male who presents with a chief complaint of Watchman Procedure       ====================  SUMMARY:79 year old male patient with pmh of HTN, HLD, atrial variant HCM s/p PPM/ICD [facility] Model #2411-36QDR (1/20/2019 by Dr. Lopez), AF s/p DCCV on Sotalol and Eliquis s/p multiple falls since January of this year with worsening gait over 2-3 weeks found to have R>L B/l acute on chronic SDH s/p [place] hole and Craniotomy 3/2019 (now off Eliquis) with no residual neurological symptoms (walks with a walker), DM2 (last AIc 6.9 ?1 month ago, controlled uncomplicated), non-bacterial proctitis c/b overactive bladder presented today for elective DANNY/Watchman 2/2 inability to tolerate A/C c/b Rt groin bleed  ====================        ====================  NEW EVENTS: no new event  ====================        ====================  VITALS (Last 12 hrs):  ====================    T(C): 36.4 (10-23-19 @ 19:00), Max: 36.7 (10-23-19 @ 18:00)  T(F): 97.6 (10-23-19 @ 19:00), Max: 98 (10-23-19 @ 18:00)  HR: 60 (10-23-19 @ 21:00) (60 - 86)  BP: 133/78 (10-23-19 @ 21:00) (114/59 - 163/80)  BP(mean): 100 (10-23-19 @ 21:00) (83 - 115)  RR: 18 (10-23-19 @ 21:00) (11 - 18)  SpO2: 96% (10-23-19 @ 21:00) (96% - 98%)  -    I&O's Summary    23 Oct 2019 07:01  -  23 Oct 2019 22:10  --------------------------------------------------------  IN: 240 mL / OUT: 250 mL / NET: -10 mL            ====================  NEW LABS:  ====================                          14.6   12.66 )-----------( 173      ( 23 Oct 2019 15:37 )             43.7     10-23    139  |  102  |  23  ----------------------------<  147<H>  4.1   |  26  |  1.02    Ca    10.1      23 Oct 2019 07:01    TPro  7.0  /  Alb  4.4  /  TBili  0.7  /  DBili  x   /  AST  14  /  ALT  11  /  AlkPhos  73  10-23    PT/INR - ( 23 Oct 2019 15:37 )   PT: 12.0 sec;   INR: 1.05 ratio         PTT - ( 23 Oct 2019 07:01 )  PTT:29.9 sec                ====================  PLAN:   ====================  1) Neuro  - no active issues  - no residual weakness from hx SDH    2) CV  AF s/p watchman complicated by groin bleed  - arterial/venous duplex study  - started on  aspirin full dose 325mg tonight.   - holding Xarelto for now -if no further bleeding, will start Xarelto tomorrow AM  - vascular surgery on board    3) Pulm  - no active issues    4) GI  - no active issues  - tolerating oral diet    5) Nephro  - no active issues  - normal creatinine, 1.02    6) Heme  - bleeding complication as noted above  - currently controlled with no bleeding  - repeat H/H    7) Endo  DM  - on oral agents at home  - SSI with accucheck    8) PPx  - currently on no VTE ppx due to bleeding complication

## 2019-10-23 NOTE — H&P CARDIOLOGY - HISTORY OF PRESENT ILLNESS
80 y/o male with pmh of HTN, HLD, atrial variant HCM s/p PPM/ICD [facility] Model #2411-36QDR 1/20/2019 (Dr. Lopez), Afib s/p DCCV on Sotalol on Eliquis s/p multiple falls since January of this year with worsening gait over 2-3 weeks found to have R>L B/l acute on chronic SDH s/p [place] hole and Craniotomy 3/2019 (now off Eliquis) with no residual neurological symptoms (walks with a walker), DM2 (last AIc 6.9 ?1 month ago, controlled uncomplicated), non-bacterial proctitis c/b overactive bladder followed by Dr. Avery, Cardiologist and presents today for DANNY/Watchman 2/2 inability to tolerate A/C.  Pt denies any other implantable devices other then his PPM/ICD, dentures and denies cp, sob with occasional Palpitations and no recent falls, lightheadedness or dizziness.      PCP/ENDO - Dr. Soares  Cards - Dr. Avery

## 2019-10-23 NOTE — CONSULT NOTE ADULT - ASSESSMENT
ASSESSMENT  79M s/p Watchmen device placement via R groin access, c/b postprocedure bleeding    - STAT arterial and venous duplex to rule out PSA  - Discussed with Vascular fellows ASSESSMENT  79M s/p Watchmen device placement via R groin access, c/b postprocedure bleeding  Acute bleeding has stopped with continuous direct manual pressure    - Please obtain repeat Right groin duplex in the morning  - Patient to be transferred to CCU  - Cont Serial CBCs   - Cont frequent groin checks  - Hold anticoagulation  - Discussed with Vascular fellows and Dr. Perez    Vascular Surgery Team  [id number]

## 2019-10-23 NOTE — H&P ADULT - NSHPLABSRESULTS_GEN_ALL_CORE
A/P: 79 year old male patient with pmh of HTN, HLD, atrial variant HCM s/p PPM/ICD [facility] Model #2411-36QDR (1/20/2019 by Dr. Lopez), AF s/p DCCV on Sotalol and Eliquis s/p multiple falls since January of this year with worsening gait over 2-3 weeks found to have R>L B/l acute on chronic SDH s/p [place] hole and Craniotomy 3/2019 (now off Eliquis) with no residual neurological symptoms (walks with a walker), DM2 (last AIc 6.9 ?1 month ago, controlled uncomplicated), non-bacterial proctitis c/b overactive bladder followed by Dr. Avery presented today for elective DANNY/Watchman 2/2 inability to tolerate A/C.     1) Neuro  - no active issues  - no residual weakness from hx SDH    2) CV  - s/p watchman complicated by groin bleed  - arterial/venous duplex study  - hold a/c  - vascular surgery on board    3) Pulm  - no active issues    4) GI  - no active issues  - tolerating oral diet    5) Nephro  - no active issues  - normal creatinine, 1.02    6) Heme  - bleeding complication as noted above  - repeat H/H    7) Endo  - no active issues    8) PPx  - currently on no VTE ppx due to bleeding complication    Jhoan Rod, #[phone]  Cardiology Fellow

## 2019-10-23 NOTE — H&P ADULT - ASSESSMENT
A/P: 79 year old male patient with pmh of HTN, HLD, atrial variant HCM s/p PPM/ICD [facility] Model #2411-36QDR (1/20/2019 by Dr. Lopez), AF s/p DCCV on Sotalol and Eliquis s/p multiple falls since January of this year with worsening gait over 2-3 weeks found to have R>L B/l acute on chronic SDH s/p [place] hole and Craniotomy 3/2019 (now off Eliquis) with no residual neurological symptoms (walks with a walker), DM2 (last AIc 6.9 ?1 month ago, controlled uncomplicated), non-bacterial proctitis c/b overactive bladder followed by Dr. Avery presented today for elective DANNY/Watchman 2/2 inability to tolerate A/C.     1) Neuro  - no active issues  - no residual weakness from hx SDH    2) CV  AF s/p watchman complicated by groin bleed  - arterial/venous duplex study  - start aspirin full dose 325mg today  - if no further bleeding, will start xarelto tomorrow AM  - vascular surgery on board    3) Pulm  - no active issues    4) GI  - no active issues  - tolerating oral diet    5) Nephro  - no active issues  - normal creatinine, 1.02    6) Heme  - bleeding complication as noted above  - currently controlled with no bleeding  - repeat H/H    7) Endo  DM  - on oral agents at home  - SSI with accucheck    8) PPx  - currently on no VTE ppx due to bleeding complication    Jhoan Rod, #[phone]  Cardiology Fellow

## 2019-10-23 NOTE — CONSULT NOTE ADULT - SUBJECTIVE AND OBJECTIVE BOX
VASCULAR SURGERY CONSULT NOTE    HPI:  79M hx of HTN, HLD, atrial variant HCM s/p PPM/ICD, Afib s/p DCCV on Sotalol on Eliquis s/p multiple falls since January of this year with worsening gait over 2-3 weeks found to have R>L B/l acute on chronic SDH s/p [place] hole and Craniotomy 3/2019 (now off Eliquis) with no residual neurological symptoms (walks with a walker), DM2, underwent elective DANNY/Watchman device implantation for contraindication to anticoagulation. Procedure was uncomplicated via 14 Fr sheath and 5 Fr sheath in femoral vein, and patient was brought to the recovery area. Shortly after arriving in recovery, he was noted to be sitting in a pool of blood. Direct pressure was held for 1 hour. A Fem Stop device was placed for 1:15 hours. CBC stable x1. Patient seen and examined at the bedside with vascular surgery fellows and senior resident.     PMHx: Proctitis  Urinary urgency  Urinary frequency  HLD (hyperlipidemia)  Overactive bladder  HTN (hypertension)  Type 2 diabetes mellitus    PSHx: AICD (automatic cardioverter/defibrillator) present  H/O colonoscopy with polypectomy  History of prostate surgery  S/P TURP (status post transurethral resection of prostate)  H/O left inguinal hernia repair    Medications (inpatient): acetaminophen  IVPB .. 1000 milliGRAM(s) IV Intermittent once  amLODIPine   Tablet 10 milliGRAM(s) Oral daily  aspirin enteric coated 325 milliGRAM(s) Oral daily  dextrose 5%. 1000 milliLiter(s) IV Continuous <Continuous>  dextrose 50% Injectable 12.5 Gram(s) IV Push once  dextrose 50% Injectable 25 Gram(s) IV Push once  dextrose 50% Injectable 25 Gram(s) IV Push once  insulin lispro (HumaLOG) corrective regimen sliding scale   SubCutaneous three times a day before meals  insulin lispro (HumaLOG) corrective regimen sliding scale   SubCutaneous at bedtime  rivaroxaban 20 milliGRAM(s) Oral with dinner  sodium chloride 0.9% lock flush 3 milliLiter(s) IV Push every 8 hours  sotalol 120 milliGRAM(s) Oral two times a day    Medications (PRN):dextrose 40% Gel 15 Gram(s) Oral once PRN  glucagon  Injectable 1 milliGRAM(s) IntraMuscular once PRN  LORazepam     Tablet 1 milliGRAM(s) Oral at bedtime PRN    Allergies: No Known Allergies  (Intolerances: Eliquis (Other)  )  Social Hx:   Family Hx: Family history of colon cancer (Father)      Physical Exam  T(C): 36.5  HR: 60 (60 - 60)  BP: 181/88 (181/88 - 181/88)  RR: 20 (20 - 20)  SpO2: 98% (98% - 98%)  Tmax: T(C): , Max: 36.5 (10-23-19 @ 06:40)    General: well developed, well nourished, NAD  Neuro: alert and oriented x4   Respiratory: airway patent, respirations unlabored  CVS: regular rate and rhythm  Abdomen: soft, nontender, nondistended  Extremities: no edema, sensation and movement grossly intact  Skin: warm, dry, appropriate color  Vasc: Palpable DP and PT pulses bilaterally. Right femoral pulse palpable. Bright red blood from right groin puncture site, but unclear if the source is arterial or venous.     Labs:                        14.2   6.81  )-----------( 155      ( 23 Oct 2019 12:27 )             42.5     PT/INR - ( 23 Oct 2019 07:01 )   PT: 10.7 sec;   INR: 0.93 ratio         PTT - ( 23 Oct 2019 07:01 )  PTT:29.9 sec  10-23    139  |  102  |  23  ----------------------------<  147<H>  4.1   |  26  |  1.02    Ca    10.1      23 Oct 2019 07:01    TPro  7.0  /  Alb  4.4  /  TBili  0.7  /  DBili  x   /  AST  14  /  ALT  11  /  AlkPhos  73  10-23            Imaging and other studies: VASCULAR SURGERY CONSULT NOTE    HPI:  79M hx of HTN, HLD, atrial variant HCM s/p PPM/ICD, Afib s/p DCCV on Sotalol on Eliquis s/p multiple falls since January of this year with worsening gait over 2-3 weeks found to have R>L B/l acute on chronic SDH s/p [place] hole and Craniotomy 3/2019 (now off Eliquis) with no residual neurological symptoms (walks with a walker), DM2, underwent elective DANNY/Watchman device implantation for contraindication to anticoagulation. Procedure was uncomplicated via 14 Fr sheath and 5 Fr sheath in femoral vein, and patient was brought to the recovery area. Shortly after arriving in recovery, he was noted to be sitting in a pool of blood. Direct pressure was held for 1 hour. A Fem Stop device was placed for 1:15 hours. CBC stable x1. Patient seen and examined at the bedside with vascular surgery fellows and senior resident.     PMHx: Proctitis  Urinary urgency  Urinary frequency  HLD (hyperlipidemia)  Overactive bladder  HTN (hypertension)  Type 2 diabetes mellitus    PSHx: AICD (automatic cardioverter/defibrillator) present  H/O colonoscopy with polypectomy  History of prostate surgery  S/P TURP (status post transurethral resection of prostate)  H/O left inguinal hernia repair    Medications (inpatient): acetaminophen  IVPB .. 1000 milliGRAM(s) IV Intermittent once  amLODIPine   Tablet 10 milliGRAM(s) Oral daily  aspirin enteric coated 325 milliGRAM(s) Oral daily  dextrose 5%. 1000 milliLiter(s) IV Continuous <Continuous>  dextrose 50% Injectable 12.5 Gram(s) IV Push once  dextrose 50% Injectable 25 Gram(s) IV Push once  dextrose 50% Injectable 25 Gram(s) IV Push once  insulin lispro (HumaLOG) corrective regimen sliding scale   SubCutaneous three times a day before meals  insulin lispro (HumaLOG) corrective regimen sliding scale   SubCutaneous at bedtime  rivaroxaban 20 milliGRAM(s) Oral with dinner  sodium chloride 0.9% lock flush 3 milliLiter(s) IV Push every 8 hours  sotalol 120 milliGRAM(s) Oral two times a day    Medications (PRN):dextrose 40% Gel 15 Gram(s) Oral once PRN  glucagon  Injectable 1 milliGRAM(s) IntraMuscular once PRN  LORazepam     Tablet 1 milliGRAM(s) Oral at bedtime PRN    Allergies: No Known Allergies  (Intolerances: Eliquis (Other)  )  Social Hx:   Family Hx: Family history of colon cancer (Father)      Physical Exam  T(C): 36.5  HR: 60 (60 - 60)  BP: 181/88 (181/88 - 181/88)  RR: 20 (20 - 20)  SpO2: 98% (98% - 98%)  Tmax: T(C): , Max: 36.5 (10-23-19 @ 06:40)    General: well developed, well nourished, NAD  Neuro: alert and oriented x4   Respiratory:  nonlabored on RA  CVS: regular rate and rhythm  Abdomen: soft, nontender, nondistended  Extremities: no edema, sensation and movement grossly intact  Skin: warm, dry, appropriate color  Vasc: Palpable DP and PT pulses bilaterally. Ecchymosis localized to right groin just adjacent to area of puncture site. Right femoral pulse palpable. Bright red blood from right groin puncture site, but unclear if the source is arterial or venous.     Labs:                        14.2   6.81  )-----------( 155      ( 23 Oct 2019 12:27 )             42.5     PT/INR - ( 23 Oct 2019 07:01 )   PT: 10.7 sec;   INR: 0.93 ratio         PTT - ( 23 Oct 2019 07:01 )  PTT:29.9 sec  10-23    139  |  102  |  23  ----------------------------<  147<H>  4.1   |  26  |  1.02    Ca    10.1      23 Oct 2019 07:01    TPro  7.0  /  Alb  4.4  /  TBili  0.7  /  DBili  x   /  AST  14  /  ALT  11  /  AlkPhos  73  10-23        Imaging and other studies:

## 2019-10-23 NOTE — CHART NOTE - NSCHARTNOTEFT_GEN_A_CORE
Patient blood sugar is 264 and due for Humalog 1units as per sliding scale converge .Patient refused to take insulin stating that he is on oral antihyperglycemic meds at home and is concern that he will become hypoglycemic overnight even explaining in detail about risk of hyperglycemia and close monitoring for hypoglycemia

## 2019-10-23 NOTE — CHART NOTE - NSCHARTNOTEFT_GEN_A_CORE
HPI:  80 y/o male with pmh of HTN, HLD, atrial variant HCM s/p PPM/ICD [facility] Model #2411-36QDR 1/20/2019 (Dr. Lopez), Afib s/p DCCV on Sotalol on Eliquis s/p multiple falls since January of this year with worsening gait over 2-3 weeks found to have R>L B/l acute on chronic SDH s/p [place] hole and Craniotomy 3/2019 (now off Eliquis) with no residual neurological symptoms (walks with a walker), DM2 (last AIc 6.9 ?1 month ago, controlled uncomplicated), non-bacterial proctitis c/b overactive bladder followed by Dr. Avery, Cardiologist and presents today for DANNY/Watchman 2/2 inability to tolerate A/C.  Pt denies any other implantable devices other then his PPM/ICD, dentures and denies cp, sob with occasional Palpitations and no recent falls, lightheadedness or dizziness.      PCP/ENDO - Dr. Soares  Cards - Dr. Avery (23 Oct 2019 06:40)    Pt. s/p Watchman via RFV with bleeding/hematoma observed at 1150 Continuous pressure maintained now with femstop in place. Dr. Katz attempt to release femstop with return of bleeding at approx 1 min femstop reapplied. Vascular surgery called urgently now at bedside holding manual pressure.  Stat CBC/coags/type and screen sent. duplex arterial/venous to be ordered stat. VSS

## 2019-10-23 NOTE — CHART NOTE - NSCHARTNOTEFT_GEN_A_CORE
HPI:  80 y/o male with pmh of HTN, HLD, atrial variant HCM s/p PPM/ICD [facility] Model #2411-36QDR 1/20/2019 (Dr. Lopez), Afib s/p DCCV on Sotalol on Eliquis s/p multiple falls since January of this year with worsening gait over 2-3 weeks found to have R>L B/l acute on chronic SDH s/p [place] hole and Craniotomy 3/2019 (now off Eliquis) with no residual neurological symptoms (walks with a walker), DM2 (last AIc 6.9 ?1 month ago, controlled uncomplicated), non-bacterial proctitis c/b overactive bladder followed by Dr. Avery, Cardiologist and presents today for DANNY/Watchman 2/2 inability to tolerate A/C.  Pt denies any other implantable devices other then his PPM/ICD, dentures and denies cp, sob with occasional Palpitations and no recent falls, lightheadedness or dizziness.      PCP/ENDO - Dr. Soares  Cards - Dr. Avery (23 Oct 2019 06:40)    Vascular at bedside held pressure to right femoral approx 45 min with no further bleeding.  US to r/o psuedoaneurysm done at bedside with partially thrombosed pseudoaneurysm.  Per vascular no Xarelto today, ok to give ASA.  US to be repeated 10/24/19 with possible injection, serial CBCs ordered

## 2019-10-23 NOTE — H&P ADULT - NSHPPHYSICALEXAM_GEN_ALL_CORE
General: well developed, well nourished, NAD  Neuro: AOx3  Respiratory: normal respiratory effort, no wheezing, no cough, no crackles appreciated  CVS: RRR, no M/R/G  Abdomen: soft, nontender, nondistended  Extremities: no edema, sensation and movement grossly intact  Skin: warm, dry, appropriate color  Vasc: bilateral LE pulses intact, R groin bleeding controlled at this time

## 2019-10-23 NOTE — PROCEDURE NOTE - ADDITIONAL PROCEDURE DETAILS
Called by primary team for urinary retention. Patient reports hx of TURP and urethral strictures. No attempts made by primary team. Patient was prepped and draped with sterile technique. Lidocaine jelly 2% inserted into meatus for local anesthetic. 16F julian easily inserted with return of clear yellow urine. Balloon inflated and secured to leg. Julian management per primary team    **Patient does not require special catheter for future catheterizations***

## 2019-10-23 NOTE — PROCEDURE NOTE - NSURITECHNIQUE_GU_A_CORE
Proper hand hygiene was performed/A sterile drape was used to cover all adjacent areas/The catheter was appropriately lubricated/The collection bag is below the level of the patient and urinary bladder/The catheter was secured with a securement device (e.g. StatLock)/All applicable medical record documentation is completed/Sterile gloves were worn for the duration of the procedure

## 2019-10-23 NOTE — H&P ADULT - NSHPREVIEWOFSYSTEMS_GEN_ALL_CORE
REVIEW OF SYSTEMS      General: no active issues    Skin/Breast: no active issues  	  Ophthalmologic: no active issues  	  ENMT: no active issues	    Respiratory and Thorax: breathing normal with no SOB, cough  	  Cardiovascular: denies chest pain, palpitations    Gastrointestinal: denies abd pain, normal appetite	    Genitourinary: denies  symptoms including dysuria	    Musculoskeletal: denies focal weakness, moving all 4 extremities at baseline	    Neurological: no headache, vision changes, dizziness, lightheadedness	    Psychiatric: no active issues	    Hematology/Lymphatics: bleeding from groin complications but otherwise no active issues prior to procedure    Endocrine: no active issues	    Allergic/Immunologic: no active issues

## 2019-10-24 ENCOUNTER — TRANSCRIPTION ENCOUNTER (OUTPATIENT)
Age: 79
End: 2019-10-24

## 2019-10-24 VITALS
DIASTOLIC BLOOD PRESSURE: 74 MMHG | OXYGEN SATURATION: 98 % | HEART RATE: 60 BPM | RESPIRATION RATE: 18 BRPM | TEMPERATURE: 98 F | SYSTOLIC BLOOD PRESSURE: 167 MMHG

## 2019-10-24 DIAGNOSIS — I48.91 UNSPECIFIED ATRIAL FIBRILLATION: ICD-10-CM

## 2019-10-24 DIAGNOSIS — N32.81 OVERACTIVE BLADDER: ICD-10-CM

## 2019-10-24 LAB
ANION GAP SERPL CALC-SCNC: 12 MMOL/L — SIGNIFICANT CHANGE UP (ref 5–17)
BUN SERPL-MCNC: 28 MG/DL — HIGH (ref 7–23)
CALCIUM SERPL-MCNC: 8.9 MG/DL — SIGNIFICANT CHANGE UP (ref 8.4–10.5)
CHLORIDE SERPL-SCNC: 102 MMOL/L — SIGNIFICANT CHANGE UP (ref 96–108)
CO2 SERPL-SCNC: 25 MMOL/L — SIGNIFICANT CHANGE UP (ref 22–31)
CREAT SERPL-MCNC: 1.02 MG/DL — SIGNIFICANT CHANGE UP (ref 0.5–1.3)
GLUCOSE BLDC GLUCOMTR-MCNC: 131 MG/DL — HIGH (ref 70–99)
GLUCOSE BLDC GLUCOMTR-MCNC: 216 MG/DL — HIGH (ref 70–99)
GLUCOSE SERPL-MCNC: 161 MG/DL — HIGH (ref 70–99)
HCT VFR BLD CALC: 37.7 % — LOW (ref 39–50)
HCT VFR BLD CALC: 38.1 % — LOW (ref 39–50)
HGB BLD-MCNC: 12.8 G/DL — LOW (ref 13–17)
HGB BLD-MCNC: 13 G/DL — SIGNIFICANT CHANGE UP (ref 13–17)
MAGNESIUM SERPL-MCNC: 2 MG/DL — SIGNIFICANT CHANGE UP (ref 1.6–2.6)
MCHC RBC-ENTMCNC: 31.4 PG — SIGNIFICANT CHANGE UP (ref 27–34)
MCHC RBC-ENTMCNC: 31.9 PG — SIGNIFICANT CHANGE UP (ref 27–34)
MCHC RBC-ENTMCNC: 34 GM/DL — SIGNIFICANT CHANGE UP (ref 32–36)
MCHC RBC-ENTMCNC: 34.1 GM/DL — SIGNIFICANT CHANGE UP (ref 32–36)
MCV RBC AUTO: 92.6 FL — SIGNIFICANT CHANGE UP (ref 80–100)
MCV RBC AUTO: 93.6 FL — SIGNIFICANT CHANGE UP (ref 80–100)
NRBC # BLD: 0 /100 WBCS — SIGNIFICANT CHANGE UP (ref 0–0)
NRBC # BLD: 0 /100 WBCS — SIGNIFICANT CHANGE UP (ref 0–0)
PHOSPHATE SERPL-MCNC: 3.2 MG/DL — SIGNIFICANT CHANGE UP (ref 2.5–4.5)
PLATELET # BLD AUTO: 156 K/UL — SIGNIFICANT CHANGE UP (ref 150–400)
PLATELET # BLD AUTO: 158 K/UL — SIGNIFICANT CHANGE UP (ref 150–400)
POTASSIUM SERPL-MCNC: 4 MMOL/L — SIGNIFICANT CHANGE UP (ref 3.5–5.3)
POTASSIUM SERPL-SCNC: 4 MMOL/L — SIGNIFICANT CHANGE UP (ref 3.5–5.3)
RBC # BLD: 4.07 M/UL — LOW (ref 4.2–5.8)
RBC # BLD: 4.07 M/UL — LOW (ref 4.2–5.8)
RBC # FLD: 13 % — SIGNIFICANT CHANGE UP (ref 10.3–14.5)
RBC # FLD: 13.2 % — SIGNIFICANT CHANGE UP (ref 10.3–14.5)
SODIUM SERPL-SCNC: 139 MMOL/L — SIGNIFICANT CHANGE UP (ref 135–145)
WBC # BLD: 10.26 K/UL — SIGNIFICANT CHANGE UP (ref 3.8–10.5)
WBC # BLD: 8.95 K/UL — SIGNIFICANT CHANGE UP (ref 3.8–10.5)
WBC # FLD AUTO: 10.26 K/UL — SIGNIFICANT CHANGE UP (ref 3.8–10.5)
WBC # FLD AUTO: 8.95 K/UL — SIGNIFICANT CHANGE UP (ref 3.8–10.5)

## 2019-10-24 PROCEDURE — 99239 HOSP IP/OBS DSCHRG MGMT >30: CPT

## 2019-10-24 PROCEDURE — 71046 X-RAY EXAM CHEST 2 VIEWS: CPT | Mod: 26

## 2019-10-24 PROCEDURE — 93010 ELECTROCARDIOGRAM REPORT: CPT

## 2019-10-24 PROCEDURE — 93926 LOWER EXTREMITY STUDY: CPT | Mod: 26,RT

## 2019-10-24 RX ORDER — RIVAROXABAN 15 MG-20MG
20 KIT ORAL
Refills: 0 | Status: DISCONTINUED | OUTPATIENT
Start: 2019-10-24 | End: 2019-10-24

## 2019-10-24 RX ORDER — ACETAMINOPHEN 500 MG
2 TABLET ORAL
Qty: 0 | Refills: 0 | DISCHARGE

## 2019-10-24 RX ORDER — ACETAMINOPHEN 500 MG
650 TABLET ORAL ONCE
Refills: 0 | Status: COMPLETED | OUTPATIENT
Start: 2019-10-24 | End: 2019-10-24

## 2019-10-24 RX ORDER — RIVAROXABAN 15 MG-20MG
1 KIT ORAL
Qty: 90 | Refills: 0
Start: 2019-10-24 | End: 2020-01-21

## 2019-10-24 RX ADMIN — SODIUM CHLORIDE 3 MILLILITER(S): 9 INJECTION INTRAMUSCULAR; INTRAVENOUS; SUBCUTANEOUS at 14:27

## 2019-10-24 RX ADMIN — Medication 120 MILLIGRAM(S): at 08:55

## 2019-10-24 RX ADMIN — Medication 1 TABLET(S): at 17:03

## 2019-10-24 RX ADMIN — SODIUM CHLORIDE 3 MILLILITER(S): 9 INJECTION INTRAMUSCULAR; INTRAVENOUS; SUBCUTANEOUS at 05:29

## 2019-10-24 RX ADMIN — RIVAROXABAN 20 MILLIGRAM(S): KIT at 17:24

## 2019-10-24 RX ADMIN — Medication 2: at 12:06

## 2019-10-24 RX ADMIN — AMLODIPINE BESYLATE 10 MILLIGRAM(S): 2.5 TABLET ORAL at 05:58

## 2019-10-24 RX ADMIN — Medication 1 MILLIGRAM(S): at 02:40

## 2019-10-24 RX ADMIN — Medication 650 MILLIGRAM(S): at 02:40

## 2019-10-24 RX ADMIN — Medication 650 MILLIGRAM(S): at 03:10

## 2019-10-24 NOTE — DISCHARGE NOTE NURSING/CASE MANAGEMENT/SOCIAL WORK - PATIENT PORTAL LINK FT
You can access the FollowMyHealth Patient Portal offered by [facility] by registering at the following website: [email or web address]. By joining [employer]’s FollowMyHealth portal, you will also be able to view your health information using other applications (apps) compatible with our system.

## 2019-10-24 NOTE — PROGRESS NOTE ADULT - PROBLEM SELECTOR PLAN 2
a hx of proctitis and overactive bladder  currently on julian  - D/C julian and voiding trial before discharge

## 2019-10-24 NOTE — DISCHARGE NOTE PROVIDER - CARE PROVIDERS DIRECT ADDRESSES
[email or web address]

## 2019-10-24 NOTE — PROGRESS NOTE ADULT - ASSESSMENT
ASSESSMENT  79M s/p Watchmen device placement via R groin access, c/b postprocedure bleeding  Acute bleeding stopped with continuous direct manual pressure  Drop in H/H this Am but then stable on repeat earlier today, consistent with bleeding from yesterday and recalibration  Repeat US showing resolution of CFA pseudoaneurysm.    - No acute vascular surgery intervention indicated   - May resume anticoagulation  - Trend CBC if HD unstable or worsening of groin hematoma  - Reconsult as needed by paging the number below  - Discussed with Dr. Perez    Vascular Surgery Team  [id number]

## 2019-10-24 NOTE — DISCHARGE NOTE PROVIDER - CARE PROVIDER_API CALL
Carissa Avery)  Cardiovascular Disease; Interventional Cardiology  [address] [place] [address]  Phone: [phone]  Fax: [phone]  Follow Up Time:     Jeffrey Katz)  Cardiac Electrophysiology; Cardiology  [address] [place] [address]  Phone: [phone]  Fax: [phone]  Follow Up Time:

## 2019-10-24 NOTE — DISCHARGE NOTE PROVIDER - NSDCFUSCHEDAPPT_GEN_ALL_CORE_FT
BETH MAYFIELD ; 12/09/2019 ; [facility] Cardio Echo [address]  BETH MAYFIELD ; 12/09/2019 ; [facility] Cardio Echo [address] BETH Boyer ; 01/22/2020 ; [facility] Cardio Electro [address]  BETH MAYFIELD ; 12/09/2019 ; [facility] Cardio Echo [address] BETH Boyer ; 01/22/2020 ; [facility] Cardio Electro [address]

## 2019-10-24 NOTE — PROGRESS NOTE ADULT - SUBJECTIVE AND OBJECTIVE BOX
CCU2 NOTE    Admission date: 10/23/19  Chief complaint/ Diagnosis: AF s/p watchman procedure c/b right groin bleeding   HPI: 79 year old male patient with pmh of HTN, HLD, atrial variant HCM s/p PPM/ICD [facility] Model #2411-36QDR (1/20/2019 by Dr. Lopez), AF s/p DCCV on Sotalol and Eliquis s/p multiple falls since January of this year with worsening gait over 2-3 weeks found to have R>L B/l acute on chronic SDH s/p [place] hole and Craniotomy 3/2019 (now off Eliquis) with no residual neurological symptoms (walks with a walker), DM2 (last AIc 6.9 ?1 month ago, controlled uncomplicated), non-bacterial proctitis c/b overactive bladder followed by Dr. Avery presented today for elective DANNY/Watchman 2/2 inability to tolerate A/C.     Interval history: Uneventful overnight  currently on    No right groin bleeding    REVIEW OF SYSTEMS  Denies CP, Palpitation, SOB, Dyspnea [ x ] All other systems negative    MEDICATIONS  (STANDING)  amLODIPine   Tablet 10 milliGRAM(s) Oral daily  aspirin enteric coated 325 milliGRAM(s) Oral daily  insulin lispro (HumaLOG) corrective regimen sliding scale   SubCutaneous three times a day before meals  insulin lispro (HumaLOG) corrective regimen sliding scale   SubCutaneous at bedtime  sodium chloride 0.9% lock flush 3 milliLiter(s) IV Push every 8 hours  sotalol 120 milliGRAM(s) Oral two times a day    MEDICATIONS  (PRN):  dextrose 40% Gel 15 Gram(s) Oral once PRN Blood Glucose LESS THAN 70 milliGRAM(s)/deciliter  glucagon  Injectable 1 milliGRAM(s) IntraMuscular once PRN Glucose LESS THAN 70 milligrams/deciliter  LORazepam     Tablet 1 milliGRAM(s) Oral at bedtime PRN insomia    FAMILY HISTORY: Family history of colon cancer (Father)    Allergy:   Eliquis (Other)  No Known Allergies      ICU Vital Signs Last 24 Hrs  T(C): 36.6 (Max: 36.7)  HR: 62  (60 - 86)  BP: 146/67  (109/62 - 181/88)  RR: 23 (11 - 23)  SpO2: 97%  (95% - 99%) in room air     I&O's Summary  IN: 480 mL / OUT: 635 mL / NET: -155 mL    PHYSICAL EXAM  Appearance: Normal, NAD  HEAD:  Normocephalic  EYES: PERRLA, conjunctiva and sclera clear  NECK: Supple, No JVD  CHEST/LUNG: Clear to auscultation bilaterally; No wheezing  HEART: Normal S1, S2. No murmurs, rubs, or gallops  ABDOMEN: + Bowel sounds, Soft, NT, ND   EXTREMITIES:  2+ Peripheral Pulses, No clubbing, cyanosis, or edema  NEUROLOGY: non-focal, aaox3  SKIN: No rashes or lesions    Interpretation of Telemetry: NSR                       12.8   8.95  )-----------( 156                  37.7     139  |  102  |  28<H>  --------------------------<  161<H>  4.0   |  25  |  1.02<1.02    Ca    8.9   Phos  3.2     Mg     2.0       TPro  7.0  /  Alb  4.4  /  TBili  0.7  /  DBili  x   /  AST  14  /  ALT  11  /  AlkPhos  73                  CAPILLARY BLOOD GLUCOSE      POCT Blood Glucose.: 264 mg/dL (23 Oct 2019 21:46)  POCT Blood Glucose.: 163 mg/dL (23 Oct 2019 18:16)  POCT Blood Glucose.: 189 mg/dL (23 Oct 2019 16:17)  POCT Blood Glucose.: 154 mg/dL (23 Oct 2019 13:03)
Vascular Surgery Progress Note      SUBJECTIVE: Patient seen and examined at the bedside. Feeling well today. Has been NPO, denies nausea or vomiting. Continues to have pain in the right groin. No other acute complaints. Wishes to go home. Drop in H/H this Am but then stable on repeat earlier today, consistent with bleeding from yesterday and recalibration. Repeat US showing resolution of CFA pseudoaneurysm.    VITALS  T(C): 36.7 (10-24-19 @ 15:00), Max: 36.8 (10-24-19 @ 11:26)  HR: 60 (10-24-19 @ 16:00) (60 - 86)  BP: 136/70 (10-24-19 @ 16:00) (109/62 - 173/79)  RR: 14 (10-24-19 @ 16:00) (11 - 30)  SpO2: 98% (10-24-19 @ 12:00) (95% - 99%)  CAPILLARY BLOOD GLUCOSE      POCT Blood Glucose.: 216 mg/dL (24 Oct 2019 11:57)  POCT Blood Glucose.: 131 mg/dL (24 Oct 2019 08:48)  POCT Blood Glucose.: 264 mg/dL (23 Oct 2019 21:46)  POCT Blood Glucose.: 163 mg/dL (23 Oct 2019 18:16)    Is/Os    10-23 @ 07:01  -  10-24 @ 07:00  --------------------------------------------------------  IN:    Oral Fluid: 480 mL  Total IN: 480 mL    OUT:    Voided: 685 mL  Total OUT: 685 mL    Total NET: -205 mL      10-24 @ 07:01  -  10-24 @ 16:26  --------------------------------------------------------  IN:    Oral Fluid: 360 mL  Total IN: 360 mL    OUT:    Indwelling Catheter - Urethral: 300 mL    Voided: 100 mL  Total OUT: 400 mL    Total NET: -40 mL    PHYSICAL EXAM:  General: well developed, well nourished, NAD  Neuro: alert and oriented x4   Respiratory:  nonlabored on RA  CVS: regular rate and rhythm  Abdomen: soft, nontender, nondistended  Extremities: no edema, sensation and movement grossly intact  Skin: warm, dry, appropriate color  Vasc: Palpable DP and PT pulses bilaterally. Ecchymosis localized to right groin just adjacent to area of puncture site. Mildly tender to palpation. Right femoral pulse palpable. Right groin dressing is clean and dry, no signs of active bleeding      MEDICATIONS (STANDING): amLODIPine   Tablet 10 milliGRAM(s) Oral daily  dextrose 5%. 1000 milliLiter(s) IV Continuous <Continuous>  dextrose 50% Injectable 12.5 Gram(s) IV Push once  dextrose 50% Injectable 25 Gram(s) IV Push once  dextrose 50% Injectable 25 Gram(s) IV Push once  insulin lispro (HumaLOG) corrective regimen sliding scale   SubCutaneous three times a day before meals  insulin lispro (HumaLOG) corrective regimen sliding scale   SubCutaneous at bedtime  rivaroxaban 20 milliGRAM(s) Oral with dinner  sodium chloride 0.9% lock flush 3 milliLiter(s) IV Push every 8 hours  sotalol 120 milliGRAM(s) Oral two times a day  trimethoprim  160 mG/sulfamethoxazole 800 mG 1 Tablet(s) Oral every 12 hours    MEDICATIONS (PRN):dextrose 40% Gel 15 Gram(s) Oral once PRN Blood Glucose LESS THAN 70 milliGRAM(s)/deciliter  glucagon  Injectable 1 milliGRAM(s) IntraMuscular once PRN Glucose LESS THAN 70 milligrams/deciliter  LORazepam     Tablet 1 milliGRAM(s) Oral at bedtime PRN insomia    LABS  CBC (10-24 @ 12:03)                              13.0                           10.26   )----------------(  158        --    % Neutrophils, --    % Lymphocytes, ANC: --                                  38.1<L>  CBC (10-24 @ 05:07)                              12.8<L>                         8.95    )----------------(  156        --    % Neutrophils, --    % Lymphocytes, ANC: --                                  37.7<L>    BMP (10-24 @ 05:07)             139     |  102     |  28<H> 		Ca++ --      Ca 8.9                ---------------------------------( 161<H>		Mg 2.0                4.0     |  25      |  1.02  			Ph 3.2     BMP (10-23 @ 07:01)             139     |  102     |  23    		Ca++ --      Ca 10.1               ---------------------------------( 147<H>		Mg --                 4.1     |  26      |  1.02  			Ph --        LFTs (10-23 @ 07:01)      TPro 7.0 / Alb 4.4 / TBili 0.7 / DBili -- / AST 14 / ALT 11 / AlkPhos 73    Coags (10-23 @ 15:37)  aPTT -- / INR 1.05 / PT 12.0  Coags (10-23 @ 07:01)  aPTT 29.9 / INR 0.93 / PT 10.7          IMAGING STUDIES  < from: US Duplex Arterial Lower Ext Ltd, Right (10.24.19 @ 11:19) >  INTERPRETATION:  HISTORY: Right groin pain status post watchman device   via right femoral vein. Previous ultrasound demonstrated a   pseudoaneurysm. Follow-up study.     FINDINGS: Targeted sonogram of the right groin demonstrates patency of   the common femoral artery and vein. Normal spectral wave forms are   obtained. No pseudoaneurysm or AV fistula is identified. There is a 5.2 x   1.6 x 4.7cm lobulated soft tissue hematoma.    IMPRESSION:    Resolution of the common femoral artery pseudoaneurysm.    5.2 x 1.6 x 4.7 cm soft tissue hematoma.        < end of copied text >
Called by nursing staff regarding right groin bleed s/p Watchman device. I arrived bedside at 12:10pm and noted bleeding from the right groin. Bleeding appeared to be arterial. I held pressure for 1 hour without ability to obtain hemostasis. Patient was bearing down throughout the manual hold which was increasing pressure on the groin. He noted that he needed to urinate, and his bladder did appear distended on exam. He was straight cathed with good UO. He was given 30mg of Protamine. FemStop was placed with good hemostasis acutely. Dorsalis pedis and posterior tibial pulses were palpable in the right leg. Figure 8 stitch was removed. Dr Katz was present bedside.
ISREALNAYAN TRAN  [id number]    PROCEDURE:  Watchman implantation          INDICATION:  Atrial fibrillation        ELECTROPHYSIOLOGIST(S):  MD Felecia Katz MD        FINDINGS:  - Successful Watchman placement with good tug test and compression as well as contrast testing.  Access: 5Fr RFV, 14Fr RFV    COMPLICATIONS:  None      RECOMMENDATIONS:  - ASA 325mg daily and Xarelto starting tonight  - Bedrest for 6 hours  - Stitch placed for hemostasis, will remove in 6-8 hours.
Returned to bedside to re-assess patient. Dr Katz had FemStop deflated, patient with brisk bright red bleeding again consistent with arterial bleeding. FemStop re-inflated with achievement of hemostasis. Given that he continues to have bleeding, Vascular surgery was consulted for further evaluation. Pedal pulses as prior continue to be palpable. Will continue to closely monitor.

## 2019-10-24 NOTE — PROGRESS NOTE ADULT - PROBLEM SELECTOR PLAN 1
treated w/ Sotalol and Eliquis  off Eliquis due to a HX of fall  s/p watchman procedure 10/23 c/b right groin bleeding  s/p manual compression  - repeat CBC this morning  - Arterial and venous US for the right groin  - xarelto this am if groin stable  - Ambulate as tolerated  - Follow up w/ EP and vascular   - Possible d/c Home

## 2019-10-24 NOTE — DISCHARGE NOTE PROVIDER - NSDCCPCAREPLAN_GEN_ALL_CORE_FT
PRINCIPAL DISCHARGE DIAGNOSIS  Diagnosis: Atrial fibrillation  Assessment and Plan of Treatment: status post watchman procedure  Atrial fibrillation is the most common heart rhythm problem & has the risk of stroke & heart attack  It helps if you control your blood pressure, not drink more than 1-2 alcohol drinks per day, cut down on caffeine, getting treatment for over active thyroid gland, & getting exercise  Call your doctor if you feel your heart racing or beating unusually, chest tightness or pain, lightheaded, faint, shortness of breath especially with exercise  It is important to take your heart medication as prescribed  You may be on anticoagulation which is very important to take as directed  No heavy lifting, strenuous activity, bending, straining or unnecessary stair climbing  for 2 weeks. No sex for 1 week.  No driving for 2 days. You may shower 24 hours following procedure but avoid baths and swimming for 1 week. Check groin site for bleeding and/or swelling daily following procedure. Call your doctor/cardiologist immediately should it occur or if you have increased/persistent pain at the site. Follow up with your cardiologist in 1- 2 weeks. You may call [facility] at [phone] or [phone] after office hours and weekends  with any questions or concerns following your procedure. Take medications as prescribed.

## 2019-10-24 NOTE — PROGRESS NOTE ADULT - ASSESSMENT
79 year old male patient with pmh of HTN, HLD, atrial variant HCM s/p PPM/ICD [facility] Model #2411-36QDR (1/20/2019 by Dr. Lopez), AF s/p DCCV on Sotalol and Eliquis s/p multiple falls since January of this year with worsening gait over 2-3 weeks found to have R>L B/l acute on chronic SDH s/p [place] hole and Craniotomy 3/2019 (now off Eliquis) with no residual neurological symptoms (walks with a walker), DM2 (last AIc 6.9 ?1 month ago, controlled uncomplicated), non-bacterial proctitis c/b overactive bladder followed by Dr. Avery presented today for elective DANNY/Watchman 2/2 inability to tolerate A/C.

## 2019-10-24 NOTE — DISCHARGE NOTE PROVIDER - HOSPITAL COURSE
79 year old male patient with pmh of HTN, HLD, atrial variant HCM s/p PPM/ICD [facility] Model #2411-36QDR (1/20/2019 by Dr. Lopez), AF s/p DCCV on Sotalol and Eliquis s/p multiple falls since January of this year with worsening gait over 2-3 weeks found to have R>L B/l acute on chronic SDH s/p [place] hole and Craniotomy 3/2019 (now off Eliquis) with no residual neurological symptoms (walks with a walker), DM2 (last AIc 6.9 ?1 month ago, controlled uncomplicated), non-bacterial proctitis c/b overactive bladder presented today for elective DANNY/Watchman 2/2 inability to tolerate A/C c/b Rt groin bleed. His Xarelto was held overnight. He received ASA 325mg PO. Plan for arterial/venous US which showed ....... 79 year old male patient with pmh of HTN, HLD, atrial variant HCM s/p PPM/ICD [facility] Model #2411-36QDR (1/20/2019 by Dr. Lopez), AF s/p DCCV on Sotalol and Eliquis s/p multiple falls since January of this year with worsening gait over 2-3 weeks found to have R>L B/l acute on chronic SDH s/p [place] hole and Craniotomy 3/2019 (now off Eliquis) with no residual neurological symptoms (walks with a walker), DM2 (last AIc 6.9 ?1 month ago, controlled uncomplicated), non-bacterial proctitis c/b overactive bladder presented today for elective DANNY/Watchman 2/2 inability to tolerate A/C c/b Rt groin bleed. His Xarelto was held overnight. He received ASA 325mg PO. Plan for arterial/venous US which showed resolution of pseudoaneurysms. His H/H remains stable. ASA d/c'd and xarelto 20mg po daily initiated. Corona d/c'd and pt was able to ambulate and void w/o difficulties. Pt will be discharged w/ Xarelto, Sotalol and Norvasc. 79 year old male patient with pmh of HTN, HLD, atrial variant HCM s/p PPM/ICD [facility] Model #2411-36QDR (1/20/2019 by Dr. Lopez), AF s/p DCCV on Sotalol and Eliquis s/p multiple falls since January of this year with worsening gait over 2-3 weeks found to have R>L B/l acute on chronic SDH s/p [place] hole and Craniotomy 3/2019 (now off Eliquis) with no residual neurological symptoms (walks with a walker), DM2 (last AIc 6.9 ?1 month ago, controlled uncomplicated), non-bacterial proctitis c/b overactive bladder presented today for elective DANNY/Watchman 2/2 inability to tolerate A/C c/b Rt groin bleed. His Xarelto was held overnight. He received ASA 325mg PO. Plan for arterial/venous US which showed resolution of pseudoaneurysms. His H/H remains stable. ASA d/c'd and xarelto 20mg po daily initiated.  CXR PA/LA shows the device in the good position. Corona d/c'd and pt was able to ambulate and void w/o difficulties. Pt will be discharged w/ Xarelto, Sotalol and Norvasc.

## 2019-10-25 PROBLEM — K62.89 OTHER SPECIFIED DISEASES OF ANUS AND RECTUM: Chronic | Status: ACTIVE | Noted: 2019-10-23

## 2019-10-28 ENCOUNTER — APPOINTMENT (OUTPATIENT)
Dept: ELECTROPHYSIOLOGY | Facility: CLINIC | Age: 79
End: 2019-10-28
Payer: MEDICARE

## 2019-10-28 ENCOUNTER — INBOUND DOCUMENT (OUTPATIENT)
Age: 79
End: 2019-10-28

## 2019-10-28 VITALS
SYSTOLIC BLOOD PRESSURE: 153 MMHG | HEART RATE: 57 BPM | OXYGEN SATURATION: 100 % | HEIGHT: 67 IN | DIASTOLIC BLOOD PRESSURE: 71 MMHG

## 2019-10-28 PROCEDURE — 99024 POSTOP FOLLOW-UP VISIT: CPT

## 2019-10-31 ENCOUNTER — APPOINTMENT (OUTPATIENT)
Dept: ELECTROPHYSIOLOGY | Facility: CLINIC | Age: 79
End: 2019-10-31
Payer: MEDICARE

## 2019-10-31 ENCOUNTER — APPOINTMENT (OUTPATIENT)
Dept: CARDIOLOGY | Facility: CLINIC | Age: 79
End: 2019-10-31
Payer: MEDICARE

## 2019-10-31 VITALS
BODY MASS INDEX: 29.82 KG/M2 | SYSTOLIC BLOOD PRESSURE: 138 MMHG | WEIGHT: 190 LBS | HEIGHT: 67 IN | OXYGEN SATURATION: 99 % | HEART RATE: 60 BPM | DIASTOLIC BLOOD PRESSURE: 65 MMHG

## 2019-10-31 PROCEDURE — 99215 OFFICE O/P EST HI 40 MIN: CPT

## 2019-10-31 PROCEDURE — 93283 PRGRMG EVAL IMPLANTABLE DFB: CPT

## 2019-10-31 NOTE — DISCUSSION/SUMMARY
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 We discussed adherence to a low fat low cholesterol, ADA diet, weight loss and regular daily exercise.\par \par \par

## 2019-10-31 NOTE — HISTORY OF PRESENT ILLNESS
[FreeTextEntry1] : Jeanne is a 79-year-old gentleman DM, Htn, Hyperlipidemia, s/p hospitalization after syncope with dislocation shoulder found to be in rapid afib. DC cardioversion. ECHO apical HOCM confirmed by cardiac MRI. ICD implanted and d/c on sotalol. He is a retired physician. Subsequent hospitalization for subdural hematoma requiring surgery. He is now s/p Watchman. Nervous about hematoma

## 2019-10-31 NOTE — REASON FOR VISIT
[Atrial Fibrillation] : atrial fibrillation [Follow-Up - From Hospitalization] : follow-up of a recent hospitalization for

## 2019-11-12 PROCEDURE — 86850 RBC ANTIBODY SCREEN: CPT

## 2019-11-12 PROCEDURE — C1894: CPT

## 2019-11-12 PROCEDURE — C1889: CPT

## 2019-11-12 PROCEDURE — 85730 THROMBOPLASTIN TIME PARTIAL: CPT

## 2019-11-12 PROCEDURE — 84100 ASSAY OF PHOSPHORUS: CPT

## 2019-11-12 PROCEDURE — 93355 ECHO TRANSESOPHAGEAL (TEE): CPT

## 2019-11-12 PROCEDURE — 33340 PERQ CLSR TCAT L ATR APNDGE: CPT | Mod: Q0

## 2019-11-12 PROCEDURE — C1893: CPT

## 2019-11-12 PROCEDURE — 86900 BLOOD TYPING SEROLOGIC ABO: CPT

## 2019-11-12 PROCEDURE — 82962 GLUCOSE BLOOD TEST: CPT

## 2019-11-12 PROCEDURE — 83735 ASSAY OF MAGNESIUM: CPT

## 2019-11-12 PROCEDURE — 86901 BLOOD TYPING SEROLOGIC RH(D): CPT

## 2019-11-12 PROCEDURE — 85027 COMPLETE CBC AUTOMATED: CPT

## 2019-11-12 PROCEDURE — 93005 ELECTROCARDIOGRAM TRACING: CPT

## 2019-11-12 PROCEDURE — 71046 X-RAY EXAM CHEST 2 VIEWS: CPT

## 2019-11-12 PROCEDURE — 85610 PROTHROMBIN TIME: CPT

## 2019-11-12 PROCEDURE — 80053 COMPREHEN METABOLIC PANEL: CPT

## 2019-11-12 PROCEDURE — 80048 BASIC METABOLIC PNL TOTAL CA: CPT

## 2019-11-12 PROCEDURE — 93926 LOWER EXTREMITY STUDY: CPT

## 2019-11-27 ENCOUNTER — INBOUND DOCUMENT (OUTPATIENT)
Age: 79
End: 2019-11-27

## 2019-12-09 ENCOUNTER — APPOINTMENT (OUTPATIENT)
Dept: CV DIAGNOSITCS | Facility: HOSPITAL | Age: 79
End: 2019-12-09

## 2019-12-09 ENCOUNTER — OUTPATIENT (OUTPATIENT)
Dept: OUTPATIENT SERVICES | Facility: HOSPITAL | Age: 79
LOS: 1 days | End: 2019-12-09
Payer: COMMERCIAL

## 2019-12-09 DIAGNOSIS — Z90.79 ACQUIRED ABSENCE OF OTHER GENITAL ORGAN(S): Chronic | ICD-10-CM

## 2019-12-09 DIAGNOSIS — Z98.890 OTHER SPECIFIED POSTPROCEDURAL STATES: Chronic | ICD-10-CM

## 2019-12-09 DIAGNOSIS — I48.0 PAROXYSMAL ATRIAL FIBRILLATION: ICD-10-CM

## 2019-12-09 DIAGNOSIS — Z95.810 PRESENCE OF AUTOMATIC (IMPLANTABLE) CARDIAC DEFIBRILLATOR: Chronic | ICD-10-CM

## 2019-12-09 PROCEDURE — 76376 3D RENDER W/INTRP POSTPROCES: CPT | Mod: 26

## 2019-12-09 PROCEDURE — 93312 ECHO TRANSESOPHAGEAL: CPT | Mod: 26

## 2019-12-09 PROCEDURE — 93306 TTE W/DOPPLER COMPLETE: CPT | Mod: 26

## 2019-12-09 PROCEDURE — 76376 3D RENDER W/INTRP POSTPROCES: CPT

## 2019-12-09 PROCEDURE — 93312 ECHO TRANSESOPHAGEAL: CPT

## 2019-12-09 PROCEDURE — 93306 TTE W/DOPPLER COMPLETE: CPT

## 2019-12-10 ENCOUNTER — MESSAGE (OUTPATIENT)
Age: 79
End: 2019-12-10

## 2019-12-17 ENCOUNTER — OTHER (OUTPATIENT)
Age: 79
End: 2019-12-17

## 2020-01-22 ENCOUNTER — APPOINTMENT (OUTPATIENT)
Dept: ELECTROPHYSIOLOGY | Facility: CLINIC | Age: 80
End: 2020-01-22
Payer: MEDICARE

## 2020-01-22 PROCEDURE — 93295 DEV INTERROG REMOTE 1/2/MLT: CPT

## 2020-01-22 PROCEDURE — 93296 REM INTERROG EVL PM/IDS: CPT

## 2020-01-28 ENCOUNTER — APPOINTMENT (OUTPATIENT)
Dept: CARDIOLOGY | Facility: CLINIC | Age: 80
End: 2020-01-28
Payer: MEDICARE

## 2020-01-28 ENCOUNTER — APPOINTMENT (OUTPATIENT)
Dept: ELECTROPHYSIOLOGY | Facility: CLINIC | Age: 80
End: 2020-01-28
Payer: MEDICARE

## 2020-01-28 ENCOUNTER — NON-APPOINTMENT (OUTPATIENT)
Age: 80
End: 2020-01-28

## 2020-01-28 VITALS
HEIGHT: 67 IN | HEART RATE: 79 BPM | DIASTOLIC BLOOD PRESSURE: 82 MMHG | OXYGEN SATURATION: 99 % | SYSTOLIC BLOOD PRESSURE: 139 MMHG | WEIGHT: 195 LBS | BODY MASS INDEX: 30.61 KG/M2

## 2020-01-28 PROCEDURE — 99215 OFFICE O/P EST HI 40 MIN: CPT

## 2020-01-28 PROCEDURE — 93000 ELECTROCARDIOGRAM COMPLETE: CPT

## 2020-01-28 PROCEDURE — 93283 PRGRMG EVAL IMPLANTABLE DFB: CPT

## 2020-01-28 RX ORDER — SOTALOL HYDROCHLORIDE 120 MG/1
120 TABLET ORAL TWICE DAILY
Qty: 60 | Refills: 3 | Status: DISCONTINUED | COMMUNITY
Start: 2019-02-08 | End: 2020-01-28

## 2020-01-28 NOTE — DISCUSSION/SUMMARY
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 We discussed adherence to a low fat low cholesterol, ADA diet, weight loss and regular daily exercise.\par \par \par

## 2020-01-28 NOTE — PHYSICAL EXAM
[General Appearance - Well Developed] : well developed [Normal Appearance] : normal appearance [Well Groomed] : well groomed [No Deformities] : no deformities [General Appearance - Well Nourished] : well nourished [General Appearance - In No Acute Distress] : no acute distress [Normal Conjunctiva] : the conjunctiva exhibited no abnormalities [Eyelids - No Xanthelasma] : the eyelids demonstrated no xanthelasmas [No Oral Pallor] : no oral pallor [Normal Oral Mucosa] : normal oral mucosa [No Oral Cyanosis] : no oral cyanosis [Normal Jugular Venous V Waves Present] : normal jugular venous V waves present [Normal Jugular Venous A Waves Present] : normal jugular venous A waves present [No Jugular Venous Treadwell A Waves] : no jugular venous treadwell A waves [Exaggerated Use Of Accessory Muscles For Inspiration] : no accessory muscle use [Respiration, Rhythm And Depth] : normal respiratory rhythm and effort [Auscultation Breath Sounds / Voice Sounds] : lungs were clear to auscultation bilaterally [Heart Rate And Rhythm] : heart rate and rhythm were normal [Murmurs] : no murmurs present [Heart Sounds] : normal S1 and S2 [Abdomen Soft] : soft [Abdomen Tenderness] : non-tender [Abdomen Mass (___ Cm)] : no abdominal mass palpated [Gait - Sufficient For Exercise Testing] : the gait was sufficient for exercise testing [Abnormal Walk] : normal gait [Nail Clubbing] : no clubbing of the fingernails [Cyanosis, Localized] : no localized cyanosis [Petechial Hemorrhages (___cm)] : no petechial hemorrhages [Skin Color & Pigmentation] : normal skin color and pigmentation [Skin Lesions] : no skin lesions [No Venous Stasis] : no venous stasis [] : no rash [No Xanthoma] : no  xanthoma was observed [No Skin Ulcers] : no skin ulcer [Oriented To Time, Place, And Person] : oriented to person, place, and time [Affect] : the affect was normal [Mood] : the mood was normal [No Anxiety] : not feeling anxious [FreeTextEntry1] : shaved head, staples on right skull

## 2020-04-21 ENCOUNTER — APPOINTMENT (OUTPATIENT)
Dept: ELECTROPHYSIOLOGY | Facility: CLINIC | Age: 80
End: 2020-04-21
Payer: MEDICARE

## 2020-04-21 PROCEDURE — 93296 REM INTERROG EVL PM/IDS: CPT

## 2020-04-21 PROCEDURE — 93295 DEV INTERROG REMOTE 1/2/MLT: CPT

## 2020-04-22 ENCOUNTER — APPOINTMENT (OUTPATIENT)
Dept: ELECTROPHYSIOLOGY | Facility: CLINIC | Age: 80
End: 2020-04-22

## 2020-04-30 ENCOUNTER — APPOINTMENT (OUTPATIENT)
Dept: CARDIOLOGY | Facility: CLINIC | Age: 80
End: 2020-04-30

## 2020-04-30 ENCOUNTER — APPOINTMENT (OUTPATIENT)
Dept: ELECTROPHYSIOLOGY | Facility: CLINIC | Age: 80
End: 2020-04-30

## 2020-05-01 ENCOUNTER — APPOINTMENT (OUTPATIENT)
Dept: ELECTROPHYSIOLOGY | Facility: CLINIC | Age: 80
End: 2020-05-01

## 2020-06-30 ENCOUNTER — NON-APPOINTMENT (OUTPATIENT)
Age: 80
End: 2020-06-30

## 2020-06-30 ENCOUNTER — APPOINTMENT (OUTPATIENT)
Dept: CARDIOLOGY | Facility: CLINIC | Age: 80
End: 2020-06-30
Payer: MEDICARE

## 2020-06-30 ENCOUNTER — APPOINTMENT (OUTPATIENT)
Dept: ELECTROPHYSIOLOGY | Facility: CLINIC | Age: 80
End: 2020-06-30
Payer: MEDICARE

## 2020-06-30 VITALS — SYSTOLIC BLOOD PRESSURE: 134 MMHG | DIASTOLIC BLOOD PRESSURE: 80 MMHG

## 2020-06-30 VITALS
OXYGEN SATURATION: 98 % | HEIGHT: 67 IN | HEART RATE: 52 BPM | DIASTOLIC BLOOD PRESSURE: 84 MMHG | SYSTOLIC BLOOD PRESSURE: 152 MMHG

## 2020-06-30 PROCEDURE — 99214 OFFICE O/P EST MOD 30 MIN: CPT

## 2020-06-30 PROCEDURE — 93283 PRGRMG EVAL IMPLANTABLE DFB: CPT

## 2020-06-30 NOTE — HISTORY OF PRESENT ILLNESS
[FreeTextEntry1] : Jeanne is a 79-year-old gentleman DM, Htn, Hyperlipidemia, apical HOCM, afib, ICD who is complaining of fatigue. Not going out much during quarantine and used wheelchair to get here today.

## 2020-06-30 NOTE — DISCUSSION/SUMMARY
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 We discussed adherence to a low fat low cholesterol, ADA diet, weight loss and regular daily exercise.\par \par \par

## 2020-08-19 ENCOUNTER — APPOINTMENT (OUTPATIENT)
Dept: GASTROENTEROLOGY | Facility: CLINIC | Age: 80
End: 2020-08-19
Payer: MEDICARE

## 2020-08-19 VITALS
OXYGEN SATURATION: 96 % | HEART RATE: 73 BPM | DIASTOLIC BLOOD PRESSURE: 80 MMHG | WEIGHT: 200.5 LBS | BODY MASS INDEX: 31.47 KG/M2 | TEMPERATURE: 96.5 F | SYSTOLIC BLOOD PRESSURE: 130 MMHG | HEIGHT: 67 IN

## 2020-08-19 DIAGNOSIS — Z80.0 FAMILY HISTORY OF MALIGNANT NEOPLASM OF DIGESTIVE ORGANS: ICD-10-CM

## 2020-08-19 DIAGNOSIS — Z86.010 PERSONAL HISTORY OF COLONIC POLYPS: ICD-10-CM

## 2020-08-19 PROCEDURE — 99203 OFFICE O/P NEW LOW 30 MIN: CPT

## 2020-08-19 RX ORDER — SODIUM SULFATE, POTASSIUM SULFATE, MAGNESIUM SULFATE 17.5; 3.13; 1.6 G/ML; G/ML; G/ML
17.5-3.13-1.6 SOLUTION, CONCENTRATE ORAL
Qty: 1 | Refills: 0 | Status: ACTIVE | COMMUNITY
Start: 2020-08-19 | End: 1900-01-01

## 2020-08-19 NOTE — PHYSICAL EXAM
[General Appearance - Alert] : alert [Sclera] : the sclera and conjunctiva were normal [General Appearance - In No Acute Distress] : in no acute distress [Outer Ear] : the ears and nose were normal in appearance [Auscultation Breath Sounds / Voice Sounds] : lungs were clear to auscultation bilaterally [Heart Rate And Rhythm] : heart rate was normal and rhythm regular [Heart Sounds] : normal S1 and S2 [Heart Sounds Gallop] : no gallops [Murmurs] : no murmurs [Heart Sounds Pericardial Friction Rub] : no pericardial rub [Edema] : there was no peripheral edema [Bowel Sounds] : normal bowel sounds [Abdomen Soft] : soft [Abdomen Tenderness] : non-tender [] : no hepato-splenomegaly [Abdomen Mass (___ Cm)] : no abdominal mass palpated [Skin Color & Pigmentation] : normal skin color and pigmentation [No Focal Deficits] : no focal deficits

## 2020-08-19 NOTE — HISTORY OF PRESENT ILLNESS
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 He reports family history of colon cancer in his father and several of his brothers. He reports undergoing colonoscopy 3 years ago and was found to have colon polyps. He wants a screening colonoscopy. He reports changes in bowel habits in that he is having irregular bowel movements alternating between diarrhea and constipation. He has occasional rectal bleeding consisting of bright red blood per rectum. He denies abdominal pain, nausea or vomiting. He denies weight loss. Review recent blood work showing no evidence of anemia. He has significant family history of coronary artery disease with atrial fibrillation status post AICD placement. He was taking all liquids in the past but developed a subdural hematoma. He is no longer on antiplatelet therapy. He is being followed by Dr. Carissa Avery in cardiology. The AICD was interrogated in June.

## 2020-08-19 NOTE — ASSESSMENT
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 he has a personal history of colon polyps by colonoscopy 3 years ago. I explained to him the risks, alternatives to colonoscopy. Risk including but not limited to bleeding, perforation, infection adverse medication reaction. I offered him alternative with cologuard. he is insisting on the conventional colonoscopy. This will be scheduled at [facility].

## 2020-08-30 DIAGNOSIS — Z01.818 ENCOUNTER FOR OTHER PREPROCEDURAL EXAMINATION: ICD-10-CM

## 2020-08-31 ENCOUNTER — APPOINTMENT (OUTPATIENT)
Dept: DISASTER EMERGENCY | Facility: CLINIC | Age: 80
End: 2020-08-31

## 2020-08-31 LAB — SARS-COV-2 N GENE NPH QL NAA+PROBE: NOT DETECTED

## 2020-09-03 ENCOUNTER — APPOINTMENT (OUTPATIENT)
Dept: GASTROENTEROLOGY | Facility: HOSPITAL | Age: 80
End: 2020-09-03

## 2020-09-03 ENCOUNTER — RESULT REVIEW (OUTPATIENT)
Age: 80
End: 2020-09-03

## 2020-09-03 ENCOUNTER — OUTPATIENT (OUTPATIENT)
Dept: OUTPATIENT SERVICES | Facility: HOSPITAL | Age: 80
LOS: 1 days | End: 2020-09-03
Payer: COMMERCIAL

## 2020-09-03 VITALS
DIASTOLIC BLOOD PRESSURE: 72 MMHG | WEIGHT: 199.96 LBS | RESPIRATION RATE: 20 BRPM | OXYGEN SATURATION: 98 % | SYSTOLIC BLOOD PRESSURE: 145 MMHG | HEIGHT: 67 IN | TEMPERATURE: 98 F | HEART RATE: 64 BPM

## 2020-09-03 VITALS
SYSTOLIC BLOOD PRESSURE: 144 MMHG | HEART RATE: 60 BPM | DIASTOLIC BLOOD PRESSURE: 95 MMHG | TEMPERATURE: 98 F | RESPIRATION RATE: 20 BRPM | OXYGEN SATURATION: 100 %

## 2020-09-03 DIAGNOSIS — Z90.79 ACQUIRED ABSENCE OF OTHER GENITAL ORGAN(S): Chronic | ICD-10-CM

## 2020-09-03 DIAGNOSIS — Z86.010 PERSONAL HISTORY OF COLONIC POLYPS: ICD-10-CM

## 2020-09-03 DIAGNOSIS — Z80.0 FAMILY HISTORY OF MALIGNANT NEOPLASM OF DIGESTIVE ORGANS: ICD-10-CM

## 2020-09-03 DIAGNOSIS — Z98.890 OTHER SPECIFIED POSTPROCEDURAL STATES: Chronic | ICD-10-CM

## 2020-09-03 DIAGNOSIS — Z95.810 PRESENCE OF AUTOMATIC (IMPLANTABLE) CARDIAC DEFIBRILLATOR: Chronic | ICD-10-CM

## 2020-09-03 LAB — GLUCOSE BLDC GLUCOMTR-MCNC: 140 MG/DL — HIGH (ref 70–99)

## 2020-09-03 PROCEDURE — 45385 COLONOSCOPY W/LESION REMOVAL: CPT | Mod: PT

## 2020-09-03 PROCEDURE — 88305 TISSUE EXAM BY PATHOLOGIST: CPT | Mod: 26

## 2020-09-03 PROCEDURE — 45380 COLONOSCOPY AND BIOPSY: CPT | Mod: GC,59

## 2020-09-03 PROCEDURE — 82962 GLUCOSE BLOOD TEST: CPT

## 2020-09-03 PROCEDURE — 45385 COLONOSCOPY W/LESION REMOVAL: CPT | Mod: GC

## 2020-09-03 PROCEDURE — 88305 TISSUE EXAM BY PATHOLOGIST: CPT

## 2020-09-03 PROCEDURE — 45380 COLONOSCOPY AND BIOPSY: CPT | Mod: PT,XS

## 2020-09-03 RX ORDER — CELECOXIB 200 MG/1
0 CAPSULE ORAL
Qty: 0 | Refills: 0 | DISCHARGE

## 2020-09-03 RX ORDER — SODIUM CHLORIDE 9 MG/ML
1000 INJECTION INTRAMUSCULAR; INTRAVENOUS; SUBCUTANEOUS
Refills: 0 | Status: DISCONTINUED | OUTPATIENT
Start: 2020-09-03 | End: 2020-09-17

## 2020-09-03 NOTE — PRE PROCEDURE NOTE - PRE PROCEDURE EVALUATION
Attending Physician:             Zeny Solis               Procedure: colonoscopy    Indication for Procedure: colon cancer screening  ________________________________________________________  PAST MEDICAL & SURGICAL HISTORY:  Proctitis: Non-bacterial  Urinary urgency  Urinary frequency  HLD (hyperlipidemia)  Overactive bladder  HTN (hypertension)  Type 2 diabetes mellitus  AICD (automatic cardioverter/defibrillator) present: 1/20/2019 [facility]. [facility] 2411-[facility]DR  H/O colonoscopy with polypectomy  History of prostate surgery: s/p prostate microwave therapy  S/P TURP (status post transurethral resection of prostate)  H/O left inguinal hernia repair: with mesh 15 years ago    ALLERGIES:  Eliquis (Other)  No Known Allergies    HOME MEDICATIONS:  Actos 30 mg oral tablet: 1 tab(s) orally once a day  amLODIPine 10 mg oral tablet: 1 tab(s) orally once a day  CeleBREX 100 mg oral capsule: orally once a day  glyburide-metformin 2.5 mg-500 mg oral tablet: 1 tab(s) orally 2 times a day  LORazepam 1 mg oral tablet: 1 tab(s) orally once a day (at bedtime), As Needed  sotalol 120 mg oral tablet: 1 tab(s) orally 2 times a day    AICD/PPM: [ ] yes   [ X] no    PERTINENT LAB DATA:                      PHYSICAL EXAMINATION:    Height (cm): 170.18  Weight (kg): 90.7  BMI (kg/m2): 31.3  BSA (m2): 2.02T(C): 36.4  HR: 64  BP: 145/72  RR: 20  SpO2: 98%    Constitutional: NAD  HEENT: PERRLA, EOMI,    Neck:  No JVD  Respiratory: CTAB/L  Cardiovascular: S1 and S2  Gastrointestinal: BS+, soft, NT/ND  Extremities: No peripheral edema  Neurological: A/O x 3, no focal deficits  Psychiatric: Normal mood, normal affect  Skin: No rashes    ASA Class: I [ ]  II [ ]  III [ ]  IV [ ]    COMMENTS:    The patient is a suitable candidate for the planned procedure unless box checked [ ]  No, explain:

## 2020-09-30 ENCOUNTER — APPOINTMENT (OUTPATIENT)
Dept: ELECTROPHYSIOLOGY | Facility: CLINIC | Age: 80
End: 2020-09-30
Payer: MEDICARE

## 2020-09-30 PROCEDURE — 93296 REM INTERROG EVL PM/IDS: CPT

## 2020-09-30 PROCEDURE — 93295 DEV INTERROG REMOTE 1/2/MLT: CPT

## 2020-11-19 ENCOUNTER — NON-APPOINTMENT (OUTPATIENT)
Age: 80
End: 2020-11-19

## 2021-01-05 ENCOUNTER — APPOINTMENT (OUTPATIENT)
Dept: CARDIOLOGY | Facility: CLINIC | Age: 81
End: 2021-01-05
Payer: MEDICARE

## 2021-01-05 ENCOUNTER — APPOINTMENT (OUTPATIENT)
Dept: ELECTROPHYSIOLOGY | Facility: CLINIC | Age: 81
End: 2021-01-05
Payer: MEDICARE

## 2021-01-05 ENCOUNTER — NON-APPOINTMENT (OUTPATIENT)
Age: 81
End: 2021-01-05

## 2021-01-05 VITALS
HEART RATE: 60 BPM | SYSTOLIC BLOOD PRESSURE: 131 MMHG | WEIGHT: 195 LBS | DIASTOLIC BLOOD PRESSURE: 70 MMHG | OXYGEN SATURATION: 97 % | BODY MASS INDEX: 30.61 KG/M2 | HEIGHT: 67 IN

## 2021-01-05 PROCEDURE — 93283 PRGRMG EVAL IMPLANTABLE DFB: CPT

## 2021-01-05 PROCEDURE — 99072 ADDL SUPL MATRL&STAF TM PHE: CPT

## 2021-01-05 PROCEDURE — 99214 OFFICE O/P EST MOD 30 MIN: CPT

## 2021-01-05 RX ORDER — METOPROLOL SUCCINATE 25 MG/1
25 TABLET, EXTENDED RELEASE ORAL DAILY
Qty: 90 | Refills: 3 | Status: ACTIVE | COMMUNITY
Start: 2020-01-28 | End: 1900-01-01

## 2021-01-05 NOTE — HISTORY OF PRESENT ILLNESS
[FreeTextEntry1] : Jeanne is a 79-year-old gentleman DM, Htn, Hyperlipidemia, apical HOCM, afib, ICD s/p WATCHMAN who is very fatigued. Goes for walk by the water when weather permits.

## 2021-01-05 NOTE — DISCUSSION/SUMMARY
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 We discussed adherence to a low fat low cholesterol, ADA diet, weight loss and regular daily exercise.\par \par \par

## 2021-02-18 NOTE — ED PROVIDER NOTE - CPE EDP SKIN NORM
DATE OF SURGERY:  11/20/2017    SURGEON:  Lobo Landry MD    REFERRING PHYSICIAN: Lobo Landry MD    ASSISTANT:  Louie Selby MD.    PREOPERATIVE DIAGNOSIS(ES):  Adenocarcinoma of prostate.    POSTOPERATIVE DIAGNOSIS(ES):  Adenocarcinoma of prostate.    INDICATION:  To remove cancer.    PROCEDURE:  Robotic-assisted laparoscopic prostatectomy.  Bilateral pelvic lymphadenectomy portion will be dictated separately by Dr. Selby.    SUMMARY OF EVENTS:  With the patient under general anesthesia, an uncomplicated robotic-assisted laparoscopic prostatectomy and bilateral pelvic lymphadenectomy were performed.  Blood loss was approximately 200 mL.  He tolerated the procedure without complication.    INDICATIONS FOR THE PROCEDURE:  The patient is a 73-year-old gentleman with high volume intermediate risk prostate cancer.  Treatment options have been discussed and at this time, he presents for robotic-assisted laparoscopic prostatectomy and pelvic lymphadenectomy.  Risks of the procedure including, but not limited to, recurrence of prostate cancer, incontinence, impotence, blood loss possibly requiring transfusion, thromboembolic events, injury to other intraabdominal organs, lymphocele, urine leak, risks of anesthesia, and others have been discussed and informed consent obtained.    OPERATIVE REPORT:  The patient was identified and appropriate informed consent obtained.  He was brought to OR [facility], where general anesthesia was administered and endotracheal intubation performed.  He was placed in the dorsal lithotomy position and abdomen, pelvis, and genitalia were prepped and draped in sterile fashion.  He received 2 g of cefazolin intravenously for prophylaxis.  Appropriate surgical pause was made.  A Larsen catheter was placed under sterile conditions.     An 8 mm periumbilical incision was made and Veress needle was introduced.  The abdomen was insufflated to 15 mmHg without incident using carbon dioxide.   A robotic port was placed at this location.  Two additional robotic ports were placed.  Laparoscopy was performed.  There was no evidence of trauma or injury from establishing pneumoperitoneum.  Two additional robotic ports were placed in the left lower quadrant and a fourth was placed in the right lower quadrant.  12 mm and 5 mm assistant ports were placed in the right lower quadrant as well.  He was then placed in Trendelenburg position.  The da Sherry robot was docked.     Beginning posteriorly, adhesions between the sigmoid colon and left pelvic sidewall were taken down with sharp dissection.  The peritoneum overlying the vasa and seminal vesicles was incised.  The plane between the prostate and rectum was developed with a combination of blunt and sharp dissection.  The vasa and seminal vesicles were dissected free and the vasa amputated at the level of the ampulla.     Turning anteriorly, the medial umbilical ligaments were incised bilaterally and using blunt and sharp dissection, the bladder was dropped and the space of Retzius entered.  Fat overlying the prostate was removed.  Endopelvic fascia was incised bilaterally from the base of the gland towards the apex.  The deep dorsal venous complex was ligated with 0 Vicryl in figure-of-eight fashion.     The anterior bladder neck was identified and incised exposing the Larsen catheter.  The tip of the catheter was brought through the defect and used to reflect the prostate anteriorly.  The lateral bladder neck attachments were taken down with electrocautery exposing the posterior bladder neck which was incised as was Denonvilliers fascia posterior to the bladder neck.  The previously dissected seminal vesicles and vasa were then brought forth through this defect and used to retract the prostate anteriorly.     Cavernous neurovascular bundle sparing was performed on the left side.  The fascia overlying the prostate was incised on the left lateral portion of the  gland.  This fasciotomy was then carried out toward the apex and then back towards the base of the gland.  It was used to push the neurovascular bundle posteriorly.  The inferior vesical pedicle was then ligated with Hem-o-taty clips and divided sharply.  On the right side, a wide excision was performed given the high-volume palpable cancer on this side.  The inferior vesical pedicle was ligated with Hem-o-taty clips and divided sharply and the dissection carried out wide of the prostate and near the pelvic sidewall and this was carried down toward the apex, freeing up the gland and leaving just the apical connection.     The deep dorsal venous complex was divided with electrocautery exposing the anterior prostatic notch and urethra.  The urethra was divided circumferentially just distal to the prostatic notch.  Rectourethral or urethral fibers were then excised freeing up the gland which was placed in an EndoCatch bag to be extracted later.     Pelvic lymphadenectomy was then performed at this time.  This will be dictated separately by Dr. Selby.     The bladder neck was inspected and appeared of appropriate caliber.  A running vesicourethral anastomosis was performed over an 18-Tongan catheter using a double-armed 2-0 V-Loc suture.  The anastomosis was tested by irrigating the bladder and appeared watertight.     The da Sherry robot was then undocked.  A 15-Tongan round Alessandro-Tse drain was placed in the left most lateral robotic port and secured with 2-0 nylon.  The periumbilical incision was extended to approximately 2-1/2 inches and the prostate brought forth through this defect and sent to Pathology for permanent fixation and analysis.  Fascia at this site was then closed with interrupted 0 Vicryl.  Skin at all sites was closed with 4-0 Monocryl in subcuticular fashion.  All port sites were infiltrated with 0.25% plain Marcaine.  All counts were correct.  Blood loss was 200 mL.  He tolerated the procedure  without complication.  He was awakened from anesthesia and transferred to Recovery in stable condition.    TYPE 2 PROCEDURE:  Clean, contaminated.    SPECIMEN:  Prostate, bilateral pelvic lymph nodes.    ANTIBIOTICS:  Cefazolin.    DRAINS:  Urethral catheter, pelvic DAJUAN drain.    COMPLICATIONS:  None.    DISPOSITION:  The patient will recover in the postanesthesia care unit.  Once stable, he will be transferred to the surgical floor for the remainder of his admission.        ______________________________  Lobo Landry MD  [id number]      D:  11/20/2017 17:47:41  T:  11/20/2017 20:54:07   KELSIE/modl   Job:  [id number]    cc: Yadira Kenney NP          normal...

## 2021-03-23 NOTE — ED ADULT NURSE NOTE - CAS EDN DISCHARGE ASSESSMENT
Alert and oriented to person, place and time Simple: Patient demonstrates quick and easy understanding/Verbalized Understanding

## 2021-04-06 ENCOUNTER — NON-APPOINTMENT (OUTPATIENT)
Age: 81
End: 2021-04-06

## 2021-04-06 ENCOUNTER — APPOINTMENT (OUTPATIENT)
Dept: ELECTROPHYSIOLOGY | Facility: CLINIC | Age: 81
End: 2021-04-06
Payer: MEDICARE

## 2021-04-06 PROCEDURE — 93296 REM INTERROG EVL PM/IDS: CPT

## 2021-04-06 PROCEDURE — 93295 DEV INTERROG REMOTE 1/2/MLT: CPT

## 2021-07-06 ENCOUNTER — NON-APPOINTMENT (OUTPATIENT)
Age: 81
End: 2021-07-06

## 2021-07-06 ENCOUNTER — APPOINTMENT (OUTPATIENT)
Dept: ELECTROPHYSIOLOGY | Facility: CLINIC | Age: 81
End: 2021-07-06
Payer: MEDICARE

## 2021-07-06 PROCEDURE — 93296 REM INTERROG EVL PM/IDS: CPT

## 2021-07-06 PROCEDURE — 93295 DEV INTERROG REMOTE 1/2/MLT: CPT

## 2021-07-07 ENCOUNTER — APPOINTMENT (OUTPATIENT)
Dept: ELECTROPHYSIOLOGY | Facility: CLINIC | Age: 81
End: 2021-07-07

## 2021-07-13 ENCOUNTER — APPOINTMENT (OUTPATIENT)
Dept: CARDIOLOGY | Facility: CLINIC | Age: 81
End: 2021-07-13
Payer: MEDICARE

## 2021-07-13 ENCOUNTER — APPOINTMENT (OUTPATIENT)
Dept: ELECTROPHYSIOLOGY | Facility: CLINIC | Age: 81
End: 2021-07-13
Payer: MEDICARE

## 2021-07-13 ENCOUNTER — NON-APPOINTMENT (OUTPATIENT)
Age: 81
End: 2021-07-13

## 2021-07-13 VITALS — DIASTOLIC BLOOD PRESSURE: 80 MMHG | SYSTOLIC BLOOD PRESSURE: 130 MMHG

## 2021-07-13 VITALS
SYSTOLIC BLOOD PRESSURE: 153 MMHG | BODY MASS INDEX: 30.29 KG/M2 | WEIGHT: 193 LBS | HEART RATE: 60 BPM | OXYGEN SATURATION: 98 % | HEIGHT: 67 IN | DIASTOLIC BLOOD PRESSURE: 83 MMHG

## 2021-07-13 PROCEDURE — 93283 PRGRMG EVAL IMPLANTABLE DFB: CPT

## 2021-07-13 PROCEDURE — 99214 OFFICE O/P EST MOD 30 MIN: CPT | Mod: 25

## 2021-07-13 PROCEDURE — 93000 ELECTROCARDIOGRAM COMPLETE: CPT

## 2021-07-18 NOTE — HISTORY OF PRESENT ILLNESS
[FreeTextEntry1] : Jeanne is so much happier since he had WATCHMAN. No chest pain, palpitations, dizziness, bleeding or shortness of breath.

## 2021-07-18 NOTE — REASON FOR VISIT
[Symptom and Test Evaluation] : symptom and test evaluation [Arrhythmia/ECG Abnorrmalities] : arrhythmia/ECG abnormalities [Spouse] : spouse

## 2021-07-18 NOTE — DISCUSSION/SUMMARY
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 \par #1 HOCM Apical- [facility] ICD, no further dizziness\par #2 Afib-  s/p Watchman, chronic afib on interrogation,rate controlled on toprol 25mg \par #3 DM- HbA1c 6.6%\par #4 Htn- off  Accupril 40mg, continue amlodipine\par #5 Lipids- continue atorvastatin, therapeutic\par #6 Neuro- s/p surgery,no anticoagulation\par #6 General-  ambulate with cane and wheelchair. We discussed adherence to a low fat low cholesterol, ADA diet, weight loss and regular daily exercise. Received COVID vaccines\par \par \par

## 2021-08-23 ENCOUNTER — OUTPATIENT (OUTPATIENT)
Dept: OUTPATIENT SERVICES | Facility: HOSPITAL | Age: 81
LOS: 1 days | End: 2021-08-23
Payer: COMMERCIAL

## 2021-08-23 ENCOUNTER — APPOINTMENT (OUTPATIENT)
Dept: CV DIAGNOSITCS | Facility: HOSPITAL | Age: 81
End: 2021-08-23

## 2021-08-23 DIAGNOSIS — Z98.890 OTHER SPECIFIED POSTPROCEDURAL STATES: Chronic | ICD-10-CM

## 2021-08-23 DIAGNOSIS — Z95.810 PRESENCE OF AUTOMATIC (IMPLANTABLE) CARDIAC DEFIBRILLATOR: Chronic | ICD-10-CM

## 2021-08-23 DIAGNOSIS — Z90.79 ACQUIRED ABSENCE OF OTHER GENITAL ORGAN(S): Chronic | ICD-10-CM

## 2021-08-23 DIAGNOSIS — I42.2 OTHER HYPERTROPHIC CARDIOMYOPATHY: ICD-10-CM

## 2021-08-23 DIAGNOSIS — I48.20 CHRONIC ATRIAL FIBRILLATION, UNSPECIFIED: ICD-10-CM

## 2021-08-23 PROCEDURE — 93306 TTE W/DOPPLER COMPLETE: CPT | Mod: 26

## 2021-08-23 PROCEDURE — C8929: CPT

## 2021-10-05 ENCOUNTER — NON-APPOINTMENT (OUTPATIENT)
Age: 81
End: 2021-10-05

## 2021-10-05 ENCOUNTER — APPOINTMENT (OUTPATIENT)
Dept: ELECTROPHYSIOLOGY | Facility: CLINIC | Age: 81
End: 2021-10-05
Payer: MEDICARE

## 2021-10-05 PROCEDURE — 93295 DEV INTERROG REMOTE 1/2/MLT: CPT

## 2021-10-05 PROCEDURE — 93296 REM INTERROG EVL PM/IDS: CPT

## 2021-10-26 NOTE — ED PROVIDER NOTE - DISPOSITION TYPE
Cardiology Progress Note     Today's Plan CPM  rerstart anticoag per ortho    Admit Date:  10/22/2021    Consult reason/ Seen today for: pre op clearance     Subjective and  Overnight Events:  Resting in bed- denies chest pain or shortness of breath     Assessment / Plan / Recommendation:     1. Pre op - moderate risk risk  2. VHD- h/o AVR- stable - AC on hold for surgery- resume once able   S/p fall with fracture of right femoral neck -had surgical repair     History of Presenting Illness:    Chief complain on admission : 61 y. o.year old who is admitted for  Chief Complaint   Patient presents with    Hip Pain     right hip goes to right leg/thigh        Past medical history:    has a past medical history of Acid reflux, Acute frontal sinusitis, Alcohol abuse, Anesthesia, Anxiety, Arthritis, Atypical mycobacterial infection, Broken teeth, CHF (congestive heart failure) ([facility]), COPD (chronic obstructive pulmonary disease) ([facility] Cough, Depression, Depression, Dyspnea, H/O cardiovascular MUGA stress test, H/O echocardiogram, H/O echocardiogram, History of 24 hour EKG monitoring, [place] (hard of hearing), Hyperlipidemia, Hypertension, Irritant contact dermatitis due to other chemical products, Other drug allergy(995.27), S/P AVR, Shortness of breath, and Teeth missing. Past surgical history:   has a past surgical history that includes Lung biopsy (Right, 1996); Rotator cuff repair (Right, 2008); Dental surgery; Cardiac valve replacement (12/17/2003); Colonoscopy (2006); Endoscopy, colon, diagnostic (In Past); Tonsillectomy (1959 Or 1960); bronchoscopy (1996); Knee arthroscopy (Left, 1992); other surgical history (1992); and HEMIARTHROPLASTY SHOULDER FRACTURE (Right, 1/29/2019). Social History:   reports that he has never smoked. His smokeless tobacco use includes snuff and chew. He reports current alcohol use.  He reports that he does not 500 mg Oral Daily    montelukast  10 mg Oral Nightly    atorvastatin  40 mg Oral Daily    venlafaxine  150 mg Oral Daily    sodium chloride flush  5-40 mL IntraVENous 2 times per day      [Held by provider] lactated ringers 125 mL/hr at 10/25/21 1029    sodium chloride       sodium chloride flush, sodium chloride, acetaminophen, oxyCODONE **OR** oxyCODONE, ketorolac, albuterol sulfate HFA, ipratropium-albuterol, diphenhydrAMINE **AND** haloperidol lactate **AND** LORazepam, morphine, cyclobenzaprine, sodium chloride flush, ondansetron **OR** ondansetron, polyethylene glycol, [DISCONTINUED] acetaminophen **OR** acetaminophen, morphine, hydrALAZINE    Lab Data:  CBC:   Recent Labs     10/24/21  0626 10/24/21  1500 10/25/21  0101 10/25/21  1449 10/26/21  0244   WBC 5.9  --   --  13.0*  --    HGB 8.1*   < > 7.7* 8.0* 7.0*   HCT 25.0*   < > 24.0* 24.0* 21.8*   MCV 94.3  --   --  93.8  --      --   --  364  --     < > = values in this interval not displayed. BMP:   Recent Labs     10/24/21  1500 10/25/21  1449   * 136  135   K 4.0 4.8  4.8    100  99   CO2 21 24  25   BUN 19 14  14   CREATININE 0.4* 0.6*  0.6*     PT/INR:   Recent Labs     10/25/21  0101 10/25/21  1449 10/26/21  0244   PROTIME 13.8 12.4 12.6   INR 1.07 0.96 0.98     BNP:    Recent Labs     10/25/21  1449   PROBNP 445.2*     TROPONIN: No results for input(s): TROPONINT in the last 72 hours.            Impression:  Principal Problem:    Closed transcervical fracture of right femur ([facility] [address]  Active Problems:    Severe episode of recurrent major depressive disorder, with psychotic features ([facility] [address]    Passive suicidal ideations    Paranoia ([facility] [address]    Hyperlipidemia    COPD (chronic obstructive pulmonary disease) (HCC)    Anxiety    Chronic interstitial lung disease ([facility] [address]    Femoral neck stress fracture, initial encounter    H/O mechanical aortic valve replacement    Fatigue fracture of lumbar vertebra with routine healing  Resolved Problems:    * No resolved hospital problems. *       All labs, medications and tests reviewed by myself, continue all other medications of all above medical condition listed as is except for changes mentioned above. Thank you   Please call with questions.     Electronically signed by Jaylin Jennings MD on 10/26/2021 at 8:24 AM DISCHARGE

## 2022-01-04 ENCOUNTER — NON-APPOINTMENT (OUTPATIENT)
Age: 82
End: 2022-01-04

## 2022-01-04 ENCOUNTER — APPOINTMENT (OUTPATIENT)
Dept: ELECTROPHYSIOLOGY | Facility: CLINIC | Age: 82
End: 2022-01-04
Payer: MEDICARE

## 2022-01-04 PROCEDURE — 93296 REM INTERROG EVL PM/IDS: CPT

## 2022-01-04 PROCEDURE — 93295 DEV INTERROG REMOTE 1/2/MLT: CPT

## 2022-02-07 NOTE — PRE-ANESTHESIA EVALUATION ADULT - HEIGHT IN FEET
Endocrinology   Consult Note      Dear Doctor  Anam Bishop for the Consult     Pt. Was Admitted for : Shortness of breath, hypoxia and Covid pneumonia    Reason for Consult: Better control of blood glucose      History Obtained From:  Patient/ EMR       HISTORY OF PRESENT ILLNESS:                The patient is a 70 y.o. male with significant past medical history of GERD, CAD, had CABG CHF, chronic back pain, CKD, COPD, diabetes mellitus hypertension, hyperlipidemia comes in complaining of severe shortness of breath. She has positive for Covid pneumonia patient is not vaccinated for Covid vaccine. I was  consulted for better control of blood glucose. ROS:   Pt's ROS done in detail. Abnormal ROS are noted in Medical and Surgical History Section below: Other Medical History:        Diagnosis Date    Acid reflux     Arrhythmia     Arthritis     \"Back, Hands, Fingers\"    CAD (coronary artery disease)     09/2000 non-critical; 2004 no signigicante change, Sees Dr. Cuba Muñoz CHF (congestive heart failure) ([facility])     Chronic back pain     CKD (chronic kidney disease) stage 3, GFR 30-59 ml/min ([facility] [address] 12/03/2015    Sees Dr. Payton Vega COPD (chronic obstructive pulmonary disease) ([facility] [address] 11/16/15    Diabetes mellitus ([facility] [address] Dx 1990's    Glaucoma     \"Both Eyes\"    H/O 24 hour EKG monitoring 7/161/8,01/26/2017    Conclusion abnormal 48 hours of cardiac monitor finding consistent with sinus rhythm with physiological heart rate variation frequent complex ventricular ectopy. Patient did not report any symptoms for correlation    H/O cardiac catheterization 12/27/2009    Patent coronary arteries with ectasia and minimal coronary luminal irregularities and preserved left ventricular function.  H/O cardiovascular stress test 08/09/2018    Lexiscan Cardiolite.   ECG portion of test is negative for ischemia, normal ventricular contractility, no infarct or ischemia noted, normal stress myocardial perfusion, EF 58%. Normal study.  H/O complete electrocardiogram 04/05/2012    H/O Doppler ultrasound 09/13/2007    Bormal study suggestive of lack of evidence of any significant peripheral arterial disease. Patient'a symptoms are very suggestive of non-ischemic and non-vascular etiology. When compared to the previous study of 2005, ther is no significant change.  H/O Doppler ultrasound (Lower extremity) 01/30/2017    Native arteries in the examined lower extremity(ies) are patent, with no elevated velocities. No aneurysms are noted.  H/O percutaneous left heart catheterization 09/23/2016    Multivessel CAD with 100 % occluded RCA. 80 % to 90%  circumflex diffusely diseased. 80% mid LAD focal lesion.  Heat syncope 9/15/2016    [place] (hard of hearing)     Bilateral Ears    Hx of echocardiogram 2/6/19; 10/27/2016    2/6/19  LV function and size normal, mild concentric LVH, no regional wall motion abnormalities, normal diastolic filling pattern for age, mildly dilated LA, sclerotic but non-stenotic aortic valve, mitral annular calcification, RVSP= 27 mmHg, EF 55-60%.  Hyperlipidemia     Hypertension     Macular degeneration     Bilateral Eyes    Other activity(E029.9) 04/05/2012    48 Holter Monitor. Normal sinus rhythm with frequent low-grade supraventricular ectopy, without clinically significant arrhythmias.     [facility]ox[facility]mal [facility] fibrillation) [facility]     PVD (peripheral vascular disease) ([address]     S/P CABG x 3 10/25/2016    10/3/16 LIMA-LAD, SVG-PDA, SVG-Cx + Maze+Appendage resection Dr Saleh Lab 2000's    Nose    SOB (shortness of breath) on exertion 8/12/2014    Teeth missing     Upper And Lower    Ventricular ectopy     supraventricular and ventricular ectopy    Wears dentures     Full Upper    Wears glasses      Surgical History:        Procedure Laterality Date    CARDIAC SURGERY  10/03/2016    CABG (3 Bypasses)    COLONOSCOPY  Last Done In 2000's    Polyps Removed In Past    CORONARY ARTERY BYPASS GRAFT  10/03/2016    Coronary Artery Bypass Graft x 3. LIMA to LAD. SVG to PDA. SVG to Cx. MVR with CG ring. LA appendage resection. Epi and endocardial MAZE.    CYST REMOVAL  2000's    Back    DENTAL SURGERY      Teeth Extracted In Past    EYE SURGERY Bilateral 2015    Cataracts With Lens Implants    FRACTURE SURGERY Left 01/11/2016    Broken Left Hip With Hardware    OTHER SURGICAL HISTORY  10/03/2017    sternal plate    SKIN CANCER EXCISION  2000's    Nose    THORACENTESIS Left 10/06/2016    [facility]- Dr Colbert Cornea       Allergies:  Aldactone [spironolactone], Crestor [rosuvastatin calcium], Lipitor, and Zocor [simvastatin - high dose]    Family History:       Problem Relation Age of Onset    Early Death Mother 48        Liver Cancer    Cancer Mother         Liver Cancer    Cancer Father         Prostate Cancer    Diabetes Father     Other Father         \"Black Lung\"    Cancer Sister         Breast Cancer    Breast Cancer Sister     Cancer Son         Prostate Cancer     REVIEW OF SYSTEMS:  Review of System Done as noted above     PHYSICAL EXAM:      Vitals:    BP (!) 153/62   Pulse 93   Temp 98.1 °F (36.7 °C) (Oral)   Resp 22   SpO2 92%     CONSTITUTIONAL:  awake, alert, cooperative, appears stated age   EYES:  vision intact Fundoscopic Exam not performed   ENT:Normal  NECK:  Supple, No JVD. Thyroid Exam:Normal   LUNGS:  Has Vesicular Breath Sounds, somewhat diminished breath sound  CARDIOVASCULAR:  Normal apical impulse, regular rate and rhythm, normal S1 and S2, no S3 or S4, and has no  murmur   ABDOMEN:  No scars, normal bowel sounds, soft, non-distended, non-tender, no masses palpated, no hepatolienomegaly  Musculoskeletal: Normal  Extremities: Normal, peripheral pulses normal, , has no edema   NEUROLOGIC:  Awake, alert, oriented to name, place and time. Cranial nerves II-XII are grossly intact. Motor is  intact. Sensory neuropathy.  ,  and gait is normal.    DATA:    CBC:   Recent Labs     02/05/22  1345 02/06/22  0950   WBC 6.2 2.6*   HGB 11.3* 10.2*    143    CMP:  Recent Labs     02/05/22  1345 02/06/22  0950   * 131*   K 4.5 5.2*   CL 94* 93*   CO2 26 26   BUN 47* 59*   CREATININE 2.8* 2.9*   CALCIUM 8.4 8.4   PROT 6.3* 5.9*   LABALBU 3.3* 3.0*   BILITOT 0.3 0.3   ALKPHOS 100 91   AST 19 14*   ALT 14 12     Lipids:   Lab Results   Component Value Date    CHOL 151 10/04/2021    CHOL 170 12/27/2016    HDL 41 10/04/2021    TRIG 148 10/04/2021     Glucose:   Recent Labs     02/07/22  0042 02/07/22  0647 02/07/22  0737   POCGLU 567* 480* 512*     Hemoglobin A1C:   Lab Results   Component Value Date    LABA1C 7.8 01/17/2022     Free T4:   Lab Results   Component Value Date    T4FREE 1.26 10/26/2021     Free T3: No results found for: FT3  TSH High Sensitivity:   Lab Results   Component Value Date    TSHHS 2.240 01/17/2022       No orders to display          Scheduled Medicines   Medications:    baricitinib  1 mg Oral Daily    [Held by provider] insulin glargine  20 Units SubCUTAneous Nightly    [Held by provider] insulin lispro  0.08 Units/kg SubCUTAneous TID WC    [Held by provider] insulin lispro  0-12 Units SubCUTAneous TID WC    [Held by provider] insulin lispro  0-6 Units SubCUTAneous Nightly    benzonatate  100 mg Oral TID    guaiFENesin  200 mg Oral Q4H    torsemide  20 mg Oral BID    sodium chloride flush  5-40 mL IntraVENous 2 times per day    dexamethasone  6 mg IntraVENous Q24H    amLODIPine  10 mg Oral Daily    apixaban  5 mg Oral BID    aspirin EC  81 mg Oral Daily    cyclobenzaprine  10 mg Oral Nightly    metoprolol succinate  50 mg Oral Daily    pantoprazole  40 mg Oral QAM AC    senna  1 tablet Oral BID      Infusions:    dextrose      insulin      dextrose      sodium chloride      dextrose           IMPRESSION    Patient Active Problem List   Diagnosis    Palpitations    [facility]    DJD (degenerative joint disease), multiple sites    History of colonic polyps    Family history of colon cancer    Statin intolerance    SOB (shortness of breath) on exertion    Type 2 diabetes mellitus with complication (HCC)    Essential hypertension    Dyslipidemia    S/P CABG x 3    Anemia    Cardiomyopathy (HCC)    Chronic kidney disease (CKD) stage G4/A3, severely decreased glomerular filtration rate (GFR) between 15-29 mL/min/1.73 square meter and albuminuria creatinine ratio greater than 300 mg/g (HCC)    [facility]di[facility]    CAD (coronary artery disease)    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 Reviewed POC blood glucose . Labs and X ray results   2. Reviewed Home and Current Medicines   3. Will Start On IV insulkinn infusion drip    4. Monitor Blood glucose frequently   5. Modify  the dose of Insuli as needend        Will follow with you  Again thank you for sharing pt's care with me.      Truly yours,       Moose Santizo MD 5

## 2022-03-22 ENCOUNTER — APPOINTMENT (OUTPATIENT)
Dept: ELECTROPHYSIOLOGY | Facility: CLINIC | Age: 82
End: 2022-03-22
Payer: MEDICARE

## 2022-03-22 ENCOUNTER — NON-APPOINTMENT (OUTPATIENT)
Age: 82
End: 2022-03-22

## 2022-03-22 ENCOUNTER — APPOINTMENT (OUTPATIENT)
Dept: ELECTROPHYSIOLOGY | Facility: CLINIC | Age: 82
End: 2022-03-22

## 2022-03-22 ENCOUNTER — APPOINTMENT (OUTPATIENT)
Dept: CARDIOLOGY | Facility: CLINIC | Age: 82
End: 2022-03-22
Payer: MEDICARE

## 2022-03-22 VITALS
DIASTOLIC BLOOD PRESSURE: 81 MMHG | DIASTOLIC BLOOD PRESSURE: 80 MMHG | SYSTOLIC BLOOD PRESSURE: 153 MMHG | OXYGEN SATURATION: 100 % | SYSTOLIC BLOOD PRESSURE: 130 MMHG | HEIGHT: 67 IN | BODY MASS INDEX: 30.61 KG/M2 | WEIGHT: 195 LBS | HEART RATE: 66 BPM

## 2022-03-22 PROCEDURE — 93000 ELECTROCARDIOGRAM COMPLETE: CPT

## 2022-03-22 PROCEDURE — 99214 OFFICE O/P EST MOD 30 MIN: CPT

## 2022-03-22 PROCEDURE — 93283 PRGRMG EVAL IMPLANTABLE DFB: CPT

## 2022-03-23 NOTE — DISCUSSION/SUMMARY
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 We discussed adherence to a low fat low cholesterol, ADA diet, weight loss and regular daily exercise. Received COVID vaccines\par \par \par

## 2022-03-23 NOTE — HISTORY OF PRESENT ILLNESS
[FreeTextEntry1] : Jeanne has symptoms of stroke two months ago with inability to speak clearly and left hemiparesis. Refused hospitalization. Now improved with minor difficulties in speech. No anticoagulation secondary to head bleed and has WATCHMAN.

## 2022-03-25 NOTE — DISCHARGE NOTE ADULT - MEDICATION SUMMARY - MEDICATIONS TO STOP TAKING
No acute findings
I will STOP taking the medications listed below when I get home from the hospital:    metoprolol tartrate 50 mg oral tablet  -- 1 tab(s) by mouth 2 times a day    Aleve

## 2022-06-23 ENCOUNTER — APPOINTMENT (OUTPATIENT)
Dept: ELECTROPHYSIOLOGY | Facility: CLINIC | Age: 82
End: 2022-06-23
Payer: MEDICARE

## 2022-06-23 ENCOUNTER — NON-APPOINTMENT (OUTPATIENT)
Age: 82
End: 2022-06-23

## 2022-06-23 PROCEDURE — 93296 REM INTERROG EVL PM/IDS: CPT

## 2022-06-23 PROCEDURE — 93295 DEV INTERROG REMOTE 1/2/MLT: CPT

## 2022-07-26 ENCOUNTER — APPOINTMENT (OUTPATIENT)
Dept: ELECTROPHYSIOLOGY | Facility: CLINIC | Age: 82
End: 2022-07-26

## 2022-07-26 ENCOUNTER — NON-APPOINTMENT (OUTPATIENT)
Age: 82
End: 2022-07-26

## 2022-07-26 ENCOUNTER — APPOINTMENT (OUTPATIENT)
Dept: CARDIOLOGY | Facility: CLINIC | Age: 82
End: 2022-07-26

## 2022-07-26 VITALS
BODY MASS INDEX: 30.61 KG/M2 | HEART RATE: 53 BPM | DIASTOLIC BLOOD PRESSURE: 76 MMHG | OXYGEN SATURATION: 98 % | WEIGHT: 195 LBS | HEIGHT: 67 IN | SYSTOLIC BLOOD PRESSURE: 146 MMHG

## 2022-07-26 DIAGNOSIS — I48.0 PAROXYSMAL ATRIAL FIBRILLATION: ICD-10-CM

## 2022-07-26 PROCEDURE — 99214 OFFICE O/P EST MOD 30 MIN: CPT | Mod: 25

## 2022-07-26 PROCEDURE — 93000 ELECTROCARDIOGRAM COMPLETE: CPT

## 2022-07-26 PROCEDURE — 93283 PRGRMG EVAL IMPLANTABLE DFB: CPT

## 2022-07-26 RX ORDER — CLOTRIMAZOLE AND BETAMETHASONE DIPROPIONATE 10; .5 MG/G; MG/G
1-0.05 CREAM TOPICAL
Qty: 45 | Refills: 0 | Status: DISCONTINUED | COMMUNITY
Start: 2022-03-07

## 2022-07-26 RX ORDER — LORAZEPAM 1 MG/1
1 TABLET ORAL
Qty: 90 | Refills: 0 | Status: DISCONTINUED | COMMUNITY
Start: 2022-06-15

## 2022-07-26 RX ORDER — HYDROCORTISONE 25 MG/G
2.5 CREAM TOPICAL
Qty: 28 | Refills: 0 | Status: DISCONTINUED | COMMUNITY
Start: 2022-06-15

## 2022-07-26 RX ORDER — CEFDINIR 300 MG/1
300 CAPSULE ORAL
Qty: 30 | Refills: 0 | Status: DISCONTINUED | COMMUNITY
Start: 2022-03-07

## 2022-07-26 NOTE — HISTORY OF PRESENT ILLNESS
[FreeTextEntry1] : Pokshine is feeling much better and chol better on repatha. Asking why not shocked for NSVT. No symptoms.

## 2022-07-26 NOTE — PHYSICAL EXAM
[Well Developed] : well developed [Well Nourished] : well nourished [No Acute Distress] : no acute distress [Normal Conjunctiva] : normal conjunctiva [Normal Venous Pressure] : normal venous pressure [No Carotid Bruit] : no carotid bruit [Normal S1, S2] : normal S1, S2 [No Murmur] : no murmur [No Rub] : no rub [No Gallop] : no gallop [Clear Lung Fields] : clear lung fields [Good Air Entry] : good air entry [Soft] : abdomen soft [No Respiratory Distress] : no respiratory distress  [Non Tender] : non-tender [No Masses/organomegaly] : no masses/organomegaly [Normal Bowel Sounds] : normal bowel sounds [Normal Gait] : normal gait [No Edema] : no edema [No Cyanosis] : no cyanosis [No Clubbing] : no clubbing [No Varicosities] : no varicosities [No Rash] : no rash [No Skin Lesions] : no skin lesions [No Focal Deficits] : no focal deficits [Moves all extremities] : moves all extremities [Normal Speech] : normal speech [Alert and Oriented] : alert and oriented [Normal memory] : normal memory

## 2022-07-26 NOTE — DISCUSSION/SUMMARY
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 We discussed adherence to a low fat low cholesterol, ADA diet, weight loss and regular daily exercise. Received COVID vaccines\par \par \par

## 2022-10-11 NOTE — ED ADULT NURSE NOTE - MODE OF DISCHARGE
10/11/2022         RE: Niurka Cisneros  [address]  [place] [address]        Dear Colleague,    Thank you for referring your patient, Niurka Cisneros, to the [facility]. Please see a copy of my visit note below.        ~~~ NOTE: If the patient answers yes to any of the questions below, hold the infusion and contact ordering provider or on-call provider.    1.  Have you recently had an elevated temperature, fever, chills, productive cough, coughing for 3 weeks or longer or hemoptysis, abnormal vital signs, night sweats,  chest pain or have you noticed a decrease in your appetite, unexplained weight loss or fatigue? No  2. Do you have any open wounds or new incisions? (exclude for patients with hidradenitis suppurativa) No  3. Do you have any recent or upcoming hospitalizations, surgeries or dental procedures? No  4. Do you currently have or recently have had any signs of illness or infection or are you on any antibiotics? No  5. Have you or anyone in your household received a live vaccination in the past 4 weeks? Please note:  No live vaccines while on biologic/chemotherapy until 6 months after the last treatment.  Patient can receive the flu vaccine (shot only) and the pneumovax.  It is optimal for the patient to get these vaccines mid cycle, but they can be given at any time as long as it is not on the day of the infusion (exclude for patients receiving omalizumab or IV antibiotics e.g. ertapenem). No  6. Have you recently been diagnosed with any new nervous system diseases (ie. Multiple sclerosis, Guillain [place], seizures, neurological changes) or cancer diagnosis? No  7. Are you pregnant or breast feeding or do you have plans of pregnancy in the future? No  8. Have there been any other new onset medical symptoms? No      Patient presents to the [facility] for Fasenra injection.  Order written by Dr. Vega was completed today. Name and  verified with  patient. See MAR for medication details. Medication was divided into 1 syringes by pharmacy and given in the following site back side of left upper arm. Patient tolerated injection well and was monitored for 30 minutes after administration. Patient was discharged to home. Patient to return back in 8 weeks.      HUONG Cary LPN    Administrations This Visit     benralizumab (FASENRA) injection 30 mg     Admin Date  10/11/2022 Action  Given Dose  30 mg Route  Subcutaneous Administered By  Javed Cary LPN                    Again, thank you for allowing me to participate in the care of your patient.        Sincerely,        No name on file     Ambulatory

## 2022-10-25 ENCOUNTER — NON-APPOINTMENT (OUTPATIENT)
Age: 82
End: 2022-10-25

## 2022-10-25 ENCOUNTER — APPOINTMENT (OUTPATIENT)
Dept: ELECTROPHYSIOLOGY | Facility: CLINIC | Age: 82
End: 2022-10-25

## 2022-10-25 PROCEDURE — 93296 REM INTERROG EVL PM/IDS: CPT

## 2022-10-25 PROCEDURE — 93295 DEV INTERROG REMOTE 1/2/MLT: CPT

## 2022-10-30 NOTE — PROVIDER CONTACT NOTE (OTHER) - BACKGROUND
[facility] MEDICINE PROGRESS NOTE   Patient: Leon Borrero  Today's Date: 10/30/2022    YOB: 1958  Admission Date: 10/27/2022    MRN: [id number]  Inpatient LOS: 2    Room: [facility]/  Hospital Day: Hospital Day: 4      ASSESSMENT AND PLAN     Active Issues and Reason for Visit:  Principal Problem:    Acute on chronic systolic congestive heart failure (CMS/[facility])    Assessment and Plan:  #Acute HFrEF exacerbation, currently improving. EF measured at 30% in April 2022. Patient with reported 18 pound weight gain over past 2 weeks. Edema noted on physical examination.  -Strict I's and O's; 2L fluid restriction; 2 gm sodium restriction.  -IV furosemide 40 mg twice daily; patient was temporarily (10/28) on oral furosemide due to no available IV.  -Maintain on telemetry.  -Obtain daily weights.  -Echocardiogram ordered.  -Cardiology consulted; appreciate recommendations.     #Chest pain, currently stable. Patient with a history of CAD and abnormal stress test. Patient reported chest pain which resolved by itself just before presentation. Currently chest pain-free. Troponin negative x2 and initial EKG revealed paced rhythm without obvious ST segment depression or elevation.    -Telemetry as above.  -EKG for recurrent chest pain.  -Cardiology consulted as above.    #Chronic hypoxic respiratory failure, currently stable. Patient is on 2L/min at home. Secondary to underlying COPD.  -Supplemental O2 as needed; wean as tolerated.     #Paroxysmal atrial fibrillation, currently stable. On chronic anticoagulation with warfarin. Pacemaker in-place.  -Pharmacy to dose warfarin and monitor INR.  -Resume home metoprolol.     #Type 2 diabetes mellitus with hyperglycemic. Last A1c was 8.1 on 02/2022.  -Insulin glargine 50 units at night (home medicine).  -Sliding scale insulin and hypoglycemia protocol.     #Severe COPD, currently stable. Not in acute exacerbation currently.  -Resume  Trelegy.  -Duonebs and albuterol inhaler as needed.     #Chronic lactic acidosis. No signs of infection currently.  -Monitor for now.     #Thrombocytopenia. No overt bleeding reported.  -Trend with daily CBCs.    #Dyslipidemia.  -Resume home atorvastatin.     #Obstructive sleep apnea. Body mass index is 45.4 kg/m². CPAP at night and as needed.    Regarding the rest of the patient's chronic medical conditions, they are monitored during this admission and prior to admission medications are continued as indicated except as otherwise highlighted above.     DVT Prophylaxis: coumadin  Nutrition status: appropriate  Body mass index is 44.31 kg/m². - Morbid obesity (BMI >40 or 35+ and a comorbid condition)    Subjective   HISTORY AND SUBJECTIVE COMPLAINTS     Interval History / Subjective:   10/30/22: Discussed need for IV diuretics. Discussed no chest pain at this time. Discussed need for supplemental O2. Discussed care would be taken with diuresing to prevent \"drying\" patient out.    Patient denies fever, chills, nausea, or vomiting.    Hospital Course:  Leon Borrero is a 64 year old male who presented on 10/27/2022 with complaints of Shortness of Breath   and admitted for:  Acute on chronic systolic congestive heart failure (CMS/HCC) [I50.23]    ROS:  Negative for: generalized weakness, fevers, chills, cough, chest pain, palpitations, abdominal pain, nausea, vomiting, diarrhea, constipation, dysuria or focal weakness     Objective   PHYSICAL EXAMINATION     Vital 24 Hour Range Most Recent Value   Temperature Temp  Min: 96 °F (35.6 °C)  Max: 97 °F (36.1 °C) 96 °F (35.6 °C)   Pulse Pulse  Min: 72  Max: 84 76   Respiratory Resp  Min: 18  Max: 20 20   Blood Pressure BP  Min: 107/67  Max: 129/77 107/67   Pulse Oximetry SpO2  Min: 96 %  Max: 97 % 97 %   Arterial BP No data recorded     O2 O2 Flow Rate (L/min)  Av L/min  Min: 2 L/min   Min taken time: 10/29/22 2023  Max: 2 L/min   Max taken time: 10/29/22 2023        Recorded Intake and Output:    Intake/Output Summary (Last 24 hours) at 10/30/2022 0841  Last data filed at 10/30/2022 0752  Gross per 24 hour   Intake 1080 ml   Output 3000 ml   Net -1920 ml      Recorded Last Stool Occurrence: 1 (10/29/22 1433)     Vital Most Recent Value First Value   Weight 132.2 kg (291 lb 7.2 oz) Weight: 135.4 kg (298 lb 9.6 oz)   Height 5' 8\" (172.7 cm) Height: 5' 8\" (172.7 cm)   BMI 44.31 N/A     Physical Exam:  General:  Alert & Oriented X 3 in no acute distress; mood and affect appropriate for situation; well nourished/well developed  Head/ENT: Normocephalic/atraumatic; pupils are equal, round and reactive to light   Neck: Supple; trachea is midline   Respiratory: Breath sounds are clear without wheezing, rhonchi, or rales; no use of accessory muscles  Cardiovascular: S1, S2 present; no jugular venous distention  Abdomen: Soft; nontender/nondistended; + bowel sounds; No guarding or rebound; No peritoneal signs  Musculoskeletal: Good range of motion in all 4 extremities with no clubbing or deformity   Extremities/Skin: Warm; cyanosis absent; 2+ pitting edema to bilateral lower extremities.  Neurologic: Cranial Nerves 2-12 Grossly intact as best as patient can cooperate with exam; sensation is grossly intact throughout    TEST RESULTS     Labs: The Laboratory values listed below have been reviewed and pertinent findings discussed in the Assessment and Plan.  Laboratory values:   Recent Labs   Lab 10/30/22  0506 10/28/22  0422 10/27/22  1617   WBC 6.6 8.1 7.0   HGB 13.9 13.8 14.1   HCT 44.4 43.5 45.3   * 130* 119*       Recent Labs   Lab 10/30/22  0506 10/28/22  0422 10/27/22  1617   SODIUM 140 140 134*   POTASSIUM 3.2* 3.9 5.4*   CHLORIDE 101 101 100   CO2 30 32 26   CALCIUM 8.6 9.2 8.9   GLUCOSE 120* 149* 376*   BUN 24* 28* 29*   CREATININE 1.05 1.06 0.99   MG 2.5* 2.3  --         Recent Labs   Lab 10/30/22  0506 10/28/22  0422   ALBUMIN 3.6 3.7   AST 58* 40*   * 86*    BILIRUBIN 0.6 0.7     Recent Labs   Lab 10/28/22  0422 10/28/22  0120 10/27/22  1912 10/27/22  1617   PCT  --   --   --  0.06   LACTA 2.9* 3.4* 3.4* 4.4*     Recent Labs   Lab 10/29/22  1131 10/29/22  1552 10/29/22  2118 10/30/22  0750   GLUCOSE BEDSIDE 142* 354* 233* 110*       Recent Labs   Lab 10/30/22  0506 10/29/22  0614 10/28/22  0422   PT 18.8* 16.8* 19.0*   INR 1.8 1.6 1.8       Radiology: Imaging studies have been reviewed and pertinent findings discussed in the Assessment and Plan.  No results found for any visits on 10/27/22 (from the past 48 hour(s)).  ANCILLARY ORDERS     Diet:  Cardiac, Sodium 2 Gm (low Sodium), Consistent Carb Moderate (45-75 Gm/meal); Fluid Restrict 2000ml (1200 From Dietary) Diet  Telemetry: On  Lines:   Available Intravenous Access     PICC Line / CVC Line / PIV Line / Intraosseous Line / Line / UAC Line  Duration           Peripheral IV 10/28/22 Left Upper arm 20 1 day               Consults:    PHARMACY TO DOSE AND MONITOR WARFARIN  IP CONSULT TO CARDIOLOGY  Therapy Orders:   PT and OT Orders Placed this Encounter   Procedures   • Occupational Therapy   • Physical Therapy       Advance Care Planning    ADVANCED DIRECTIVES     Code Status: Selective Treatment/DNR      DISCHARGE PLANNING     The patients treatment plans were discussed with patient, RN and .   Recommendations for Discharge   SW Other (comment) (Return to [facility])   PT Home without therapy needs   OT Prior living situation   SLP       Anticipated Discharge Destination: TBD  Barriers to Discharge: Patient is not medically ready and needs to remain in the hospital today due to need for IV diuresis and close monitoring.    Jose Freeman MD  Hospitalist  10/30/2022 8:41 AM    Contact by [employer] secure chat preferred. After 6 pm, please contact on-call MD at [phone] for any questions or concerns.   hx of conversion to sr.

## 2023-01-24 ENCOUNTER — APPOINTMENT (OUTPATIENT)
Dept: DERMATOLOGY | Facility: CLINIC | Age: 83
End: 2023-01-24
Payer: MEDICARE

## 2023-01-24 ENCOUNTER — LABORATORY RESULT (OUTPATIENT)
Age: 83
End: 2023-01-24

## 2023-01-24 PROCEDURE — 99203 OFFICE O/P NEW LOW 30 MIN: CPT | Mod: 25

## 2023-01-24 PROCEDURE — 11104 PUNCH BX SKIN SINGLE LESION: CPT

## 2023-01-28 NOTE — ED PROVIDER NOTE - PHYSICAL EXAMINATION
Opt out
GEN: Well Appearing, Nontoxic, NAD  HEENT: NC/AT, Symm Facies. PERRL, EOMI, MMM, posterior pharynx clear  CV: No JVD/Bruits or stridor;  +S1S2, RRR w/o m/g/r  RESP: CTAB w/o w/r/r  ABD: Soft, nt/nd, +BS. No guarding/rebound. No RUQ tender, no CVAT  EXT/MSK: deformity to L shoulder, unable to range shoulder secondary to pain. Moving L wrist and fingers without difficulty 2+ L radial pulse.   SKIN: +abrasion to R eyebrow and R anterior knee with minimal swelling to area.   Neuro: Grossly intact, AOX3 with normal speech, CN II-XII intact; Sensation intact, motor 5/5 throughout. Gait normal

## 2023-01-31 ENCOUNTER — APPOINTMENT (OUTPATIENT)
Dept: ELECTROPHYSIOLOGY | Facility: CLINIC | Age: 83
End: 2023-01-31
Payer: MEDICARE

## 2023-01-31 ENCOUNTER — APPOINTMENT (OUTPATIENT)
Dept: CARDIOLOGY | Facility: CLINIC | Age: 83
End: 2023-01-31
Payer: MEDICARE

## 2023-01-31 VITALS
SYSTOLIC BLOOD PRESSURE: 153 MMHG | DIASTOLIC BLOOD PRESSURE: 88 MMHG | HEART RATE: 59 BPM | OXYGEN SATURATION: 98 % | RESPIRATION RATE: 12 BRPM

## 2023-01-31 PROCEDURE — 93283 PRGRMG EVAL IMPLANTABLE DFB: CPT

## 2023-01-31 PROCEDURE — 99214 OFFICE O/P EST MOD 30 MIN: CPT | Mod: 25

## 2023-01-31 PROCEDURE — 93000 ELECTROCARDIOGRAM COMPLETE: CPT

## 2023-01-31 RX ORDER — METFORMIN HYDROCHLORIDE 500 MG/1
500 TABLET, COATED ORAL
Qty: 60 | Refills: 11 | Status: ACTIVE | COMMUNITY

## 2023-02-03 ENCOUNTER — NON-APPOINTMENT (OUTPATIENT)
Age: 83
End: 2023-02-03

## 2023-02-03 RX ORDER — ITRACONAZOLE 100 MG/1
100 CAPSULE, COATED PELLETS ORAL DAILY
Qty: 20 | Refills: 0 | Status: ACTIVE | COMMUNITY
Start: 2023-02-03 | End: 1900-01-01

## 2023-02-03 NOTE — DISCUSSION/SUMMARY
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 We discussed adherence to a low fat low cholesterol, ADA diet, weight loss and regular daily exercise. Received COVID vaccines. Wife supportive.\par \par \par  [EKG obtained to assist in diagnosis and management of assessed problem(s)] : EKG obtained to assist in diagnosis and management of assessed problem(s)

## 2023-02-13 ENCOUNTER — NON-APPOINTMENT (OUTPATIENT)
Age: 83
End: 2023-02-13

## 2023-02-14 ENCOUNTER — APPOINTMENT (OUTPATIENT)
Dept: DERMATOLOGY | Facility: CLINIC | Age: 83
End: 2023-02-14
Payer: MEDICARE

## 2023-02-14 DIAGNOSIS — L30.9 DERMATITIS, UNSPECIFIED: ICD-10-CM

## 2023-02-14 PROCEDURE — 11104 PUNCH BX SKIN SINGLE LESION: CPT

## 2023-02-14 PROCEDURE — 99214 OFFICE O/P EST MOD 30 MIN: CPT | Mod: 25

## 2023-02-15 PROBLEM — L30.9 DERMATITIS: Status: ACTIVE | Noted: 2023-01-24

## 2023-02-15 NOTE — ASSESSMENT
[FreeTextEntry1] : 1. Neoplasm of unknown biological significance, on the R mid-back, worsening s/p itraconazole 200mg daily x 10 days\par - Bx c/w subcorneal neutrophilic pustule, acantholysis, and superficial perivascular inflammation with differential including IgA pemphigus and Sneddon-Phan disease. \par - Punch biopsy of inflamed skin in-between two pustules taken at right-mid back today for DIF. \par - ORDERED G6PD. Pending confirmation, plan to start Dapsone.\par - Photos taken today for clinical correlation.\par Will contact Patient with results and plan treatment considering histologic findings.\par Patient contact information below. Pt provided permission for findings to be relayed to his wife. \par [email or web address] [phone] (home phone)\par [phone] (cell)\par Biopsy by 4mm punch\par The risks/benefits/alternatives of skin biopsy were explained to the Patient which include but are not limited to bleeding, infection, scarring or discoloration of skin, and lesion recurrence. \par Patient expressed understanding of these risks and provided consent to the procedure. \par Time-out with verification of the Patient and lesion site were performed. \par Site was prepped with rubbing alcohol, lidocaine with epinephrine was injected for anesthesia, and biopsy was performed. Specimen was sent to pathology. \par Procedure was without complication and well-tolerated. Wound care was discussed.\par START clobetasol prop 0.05% oint, apply to affected areas BID. \par \par RTC 2 weeks for suture removal.\par \par

## 2023-02-15 NOTE — PHYSICAL EXAM
[FreeTextEntry3] : Red, erythematous papules and pustules coalescing into a plaque on the back, R>L; worse since LV

## 2023-02-15 NOTE — HISTORY OF PRESENT ILLNESS
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 \par Last seen 1/24/2023 with Dr. Wyatt\par \par 1. Rash on back x 2 years, presents for suture removal after bx at right upper back at . \par - Bx c/w subcorneal neutrophilic pustule, acantholysis, and superficial perivascular inflammation. No fungus seen on pathology however due to clinical concern, empirically treated with itraconazole 200mg daily x 10 days. Patient just completed course. \par - Patient feels that his rash has improved but pruritus has worsened. \par - Patient monitors his blood pressure and denies hypotension during itraconazole course. \par \par HISTORY (- 1/24/2023)- Rash on the back x 2 years. Itchy, worse at night. 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

## 2023-02-17 LAB — DERMATOLOGY BIOPSY: NORMAL

## 2023-02-20 LAB — G6PD SER-CCNC: 11.7 U/G HGB

## 2023-02-21 ENCOUNTER — NON-APPOINTMENT (OUTPATIENT)
Age: 83
End: 2023-02-21

## 2023-02-27 ENCOUNTER — NON-APPOINTMENT (OUTPATIENT)
Age: 83
End: 2023-02-27

## 2023-02-28 ENCOUNTER — APPOINTMENT (OUTPATIENT)
Dept: DERMATOLOGY | Facility: CLINIC | Age: 83
End: 2023-02-28
Payer: MEDICARE

## 2023-02-28 PROCEDURE — 99214 OFFICE O/P EST MOD 30 MIN: CPT

## 2023-04-19 ENCOUNTER — RX RENEWAL (OUTPATIENT)
Age: 83
End: 2023-04-19

## 2023-05-01 ENCOUNTER — NON-APPOINTMENT (OUTPATIENT)
Age: 83
End: 2023-05-01

## 2023-05-01 ENCOUNTER — APPOINTMENT (OUTPATIENT)
Dept: ELECTROPHYSIOLOGY | Facility: CLINIC | Age: 83
End: 2023-05-01
Payer: MEDICARE

## 2023-05-01 PROCEDURE — 93295 DEV INTERROG REMOTE 1/2/MLT: CPT

## 2023-05-01 PROCEDURE — 93296 REM INTERROG EVL PM/IDS: CPT

## 2023-05-23 ENCOUNTER — RX RENEWAL (OUTPATIENT)
Age: 83
End: 2023-05-23

## 2023-05-30 ENCOUNTER — APPOINTMENT (OUTPATIENT)
Dept: DERMATOLOGY | Facility: CLINIC | Age: 83
End: 2023-05-30
Payer: MEDICARE

## 2023-05-30 PROCEDURE — 99214 OFFICE O/P EST MOD 30 MIN: CPT

## 2023-05-30 RX ORDER — TACROLIMUS 1 MG/G
0.1 OINTMENT TOPICAL
Qty: 2 | Refills: 1 | Status: ACTIVE | COMMUNITY
Start: 2023-05-30

## 2023-07-25 ENCOUNTER — NON-APPOINTMENT (OUTPATIENT)
Age: 83
End: 2023-07-25

## 2023-07-25 ENCOUNTER — APPOINTMENT (OUTPATIENT)
Dept: ELECTROPHYSIOLOGY | Facility: CLINIC | Age: 83
End: 2023-07-25
Payer: MEDICARE

## 2023-07-25 ENCOUNTER — APPOINTMENT (OUTPATIENT)
Dept: CARDIOLOGY | Facility: CLINIC | Age: 83
End: 2023-07-25
Payer: MEDICARE

## 2023-07-25 VITALS
OXYGEN SATURATION: 100 % | WEIGHT: 195 LBS | BODY MASS INDEX: 30.61 KG/M2 | DIASTOLIC BLOOD PRESSURE: 84 MMHG | HEIGHT: 67 IN | SYSTOLIC BLOOD PRESSURE: 154 MMHG | DIASTOLIC BLOOD PRESSURE: 89 MMHG | HEART RATE: 60 BPM | SYSTOLIC BLOOD PRESSURE: 122 MMHG

## 2023-07-25 DIAGNOSIS — I10 ESSENTIAL (PRIMARY) HYPERTENSION: ICD-10-CM

## 2023-07-25 DIAGNOSIS — E11.9 TYPE 2 DIABETES MELLITUS W/OUT COMPLICATIONS: ICD-10-CM

## 2023-07-25 PROCEDURE — 99214 OFFICE O/P EST MOD 30 MIN: CPT | Mod: 25

## 2023-07-25 PROCEDURE — 93283 PRGRMG EVAL IMPLANTABLE DFB: CPT

## 2023-07-25 PROCEDURE — 93000 ELECTROCARDIOGRAM COMPLETE: CPT

## 2023-07-25 RX ORDER — EVOLOCUMAB 140 MG/ML
140 INJECTION, SOLUTION SUBCUTANEOUS
Qty: 6 | Refills: 3 | Status: ACTIVE | COMMUNITY
Start: 1900-01-01 | End: 1900-01-01

## 2023-07-25 NOTE — DISCUSSION/SUMMARY
[FreeTextEntry1] : The patient is an 83-year-old gentleman DM, HTN, HLD, Afib, apical HOCM s/p neurosurgery for subdural s/p Watchman,with symptoms of stroke who is stable.\par #1 HOCM Apical- [facility] ICD, no further dizziness, interrogation today negative\par #2 Afib-  s/p Watchman, chronic afib, rate controlled on toprol 25mg \par #3 DM- HbA1c 6.6%\par #4 Htn- continue amlodipine 10mg\par #5 Lipids- c/w repatha, optimal lipids\par #6 Neuro- s/p surgery,no anticoagulation,\par #6 General-  ambulate with cane and wheelchair. We discussed adherence to a low fat low cholesterol, ADA diet, weight loss and regular daily exercise. Received COVID vaccines. Wife supportive.\par \par \par  [EKG obtained to assist in diagnosis and management of assessed problem(s)] : EKG obtained to assist in diagnosis and management of assessed problem(s)

## 2023-07-25 NOTE — HISTORY OF PRESENT ILLNESS
[FreeTextEntry1] : Jeanne goes to the park daily but mostly just sits there and less walking. Uses walker. No other sx.

## 2023-09-18 ENCOUNTER — NON-APPOINTMENT (OUTPATIENT)
Age: 83
End: 2023-09-18

## 2023-09-19 ENCOUNTER — APPOINTMENT (OUTPATIENT)
Dept: DERMATOLOGY | Facility: CLINIC | Age: 83
End: 2023-09-19
Payer: MEDICARE

## 2023-09-19 DIAGNOSIS — I77.89 OTHER SPECIFIED DISORDERS OF ARTERIES AND ARTERIOLES: ICD-10-CM

## 2023-09-19 DIAGNOSIS — L29.9 PRURITUS, UNSPECIFIED: ICD-10-CM

## 2023-09-19 DIAGNOSIS — D22.9 MELANOCYTIC NEVI, UNSPECIFIED: ICD-10-CM

## 2023-09-19 PROCEDURE — 99214 OFFICE O/P EST MOD 30 MIN: CPT

## 2023-09-27 NOTE — CONSULT NOTE ADULT - CONSULT REQUESTED DATE/TIME
17-Jan-2019 11:00 Detail Level: Detailed Depth Of Biopsy: dermis Was A Bandage Applied: Yes Size Of Lesion In Cm: 0 Biopsy Type: H and E Biopsy Method: 15 blade Anesthesia Type: 0.5% lidocaine with 1:200,000 epinephrine Anesthesia Volume In Cc: 1 Hemostasis: Drysol Wound Care: Vaseline Dressing: Band-Aid Destruction After The Procedure: No Type Of Destruction Used: Electrodesiccation Electrodesiccation And Curettage Text: The wound bed was treated with electrodesiccation and curettage after the biopsy was performed. Silver Nitrate Text: The wound bed was treated with silver nitrate after the biopsy was performed. Lab: [id number] Lab Facility: [id number] Consent: Verbal consent was obtained and risks were reviewed including but not limited to scarring, infection, bleeding, scabbing, and incomplete removal. Post-Care Instructions: Post care instructions were reviewed. Notification Instructions: Patient will be notified of biopsy results within two weeks. Billing Type: Third-Party Bill Information: Selecting Yes will display possible errors in your note based on the variables you have selected. This validation is only offered as a suggestion for you. PLEASE NOTE THAT THE VALIDATION TEXT WILL BE REMOVED WHEN YOU FINALIZE YOUR NOTE. IF YOU WANT TO FAX A PRELIMINARY NOTE YOU WILL NEED TO TOGGLE THIS TO 'NO' IF YOU DO NOT WANT IT IN YOUR FAXED NOTE.

## 2023-10-06 NOTE — PATIENT PROFILE ADULT - LAST ORAL INTAKE
I have personally seen and examined the patient. I have collaborated with and supervised the
22-Oct-2019 23:00

## 2023-10-24 ENCOUNTER — APPOINTMENT (OUTPATIENT)
Dept: ELECTROPHYSIOLOGY | Facility: CLINIC | Age: 83
End: 2023-10-24
Payer: MEDICARE

## 2023-10-24 ENCOUNTER — APPOINTMENT (OUTPATIENT)
Dept: ELECTROPHYSIOLOGY | Facility: CLINIC | Age: 83
End: 2023-10-24

## 2023-10-24 ENCOUNTER — NON-APPOINTMENT (OUTPATIENT)
Age: 83
End: 2023-10-24

## 2023-10-24 PROCEDURE — 93296 REM INTERROG EVL PM/IDS: CPT

## 2023-10-24 PROCEDURE — 93295 DEV INTERROG REMOTE 1/2/MLT: CPT

## 2023-12-13 NOTE — ED ADULT NURSE NOTE - NS ED NURSE RECORD ANOTHER VITAL SIGN
[FreeTextEntry1] : follow up   c/o RLE heaviness with exertion he believes leg heaviness is related to losartan.  denies CP or SOB was in [facility] this summer on a cruise able to walk and tour   Yes

## 2024-01-01 NOTE — ED PROVIDER NOTE - OBJECTIVE STATEMENT
77yo male PMH HLD, HTN, DM2, atrial fibrillation on eliquis s/p ppm/aicd presenting with right wrist pain s/p mechanical fall earlier today. Patient states that he woke up this morning to scratch his back, lost his balance and fell onto floor on right wrist, does not think he hit head, no loss of consciousness. Of note, patient recently admitted for syncope and had AICD placed. He states that for last 2-3 weeks he has had difficulty ambulating at home, has had shuffling gait and moving very slowly, has had to use wheelchair at home. No numbness/tingling, no weakness, no CP/SOB.     PMD: Dr. Lay English

## 2024-01-16 ENCOUNTER — APPOINTMENT (OUTPATIENT)
Dept: CARDIOLOGY | Facility: CLINIC | Age: 84
End: 2024-01-16
Payer: MEDICARE

## 2024-01-16 ENCOUNTER — NON-APPOINTMENT (OUTPATIENT)
Age: 84
End: 2024-01-16

## 2024-01-16 ENCOUNTER — APPOINTMENT (OUTPATIENT)
Dept: ELECTROPHYSIOLOGY | Facility: CLINIC | Age: 84
End: 2024-01-16
Payer: MEDICARE

## 2024-01-16 VITALS
DIASTOLIC BLOOD PRESSURE: 88 MMHG | BODY MASS INDEX: 31.39 KG/M2 | SYSTOLIC BLOOD PRESSURE: 159 MMHG | WEIGHT: 200 LBS | HEIGHT: 67 IN | HEART RATE: 60 BPM | OXYGEN SATURATION: 97 %

## 2024-01-16 DIAGNOSIS — I48.20 CHRONIC ATRIAL FIBRILLATION, UNSP: ICD-10-CM

## 2024-01-16 DIAGNOSIS — I42.2 OTHER HYPERTROPHIC CARDIOMYOPATHY: ICD-10-CM

## 2024-01-16 DIAGNOSIS — E78.00 PURE HYPERCHOLESTEROLEMIA, UNSPECIFIED: ICD-10-CM

## 2024-01-16 PROCEDURE — 93000 ELECTROCARDIOGRAM COMPLETE: CPT | Mod: 59

## 2024-01-16 PROCEDURE — 99214 OFFICE O/P EST MOD 30 MIN: CPT | Mod: 25

## 2024-01-16 PROCEDURE — 93283 PRGRMG EVAL IMPLANTABLE DFB: CPT

## 2024-01-16 NOTE — DISCUSSION/SUMMARY
[FreeTextEntry1] : The patient is an 83-year-old gentleman DM, HTN, HLD, Afib, apical HOCM s/p neurosurgery for subdural s/p Watchman,with symptoms of stroke who is stable. #1 HOCM Apical- [facility] ICD, no further dizziness, interrogation today negative #2 Afib-  s/p Watchman, chronic afib, rate controlled on toprol 25mg  #3 DM- HbA1c 6.6% #4 HTN- c/w amlodipine 10mg #5 HLD- c/w repatha, optimal lipids, reassurance provided #6 Neuro- s/p surgery,no anticoagulation, #6 General-  ambulate with cane and wheelchair. We discussed adherence to a low fat low cholesterol, ADA diet, weight loss and regular daily exercise. Received COVID vaccines. Wife supportive.    [EKG obtained to assist in diagnosis and management of assessed problem(s)] : EKG obtained to assist in diagnosis and management of assessed problem(s)

## 2024-01-16 NOTE — HISTORY OF PRESENT ILLNESS
[FreeTextEntry1] : Jeanne has been doing better on repatha. Concerned about his kidney function. No new symptoms.

## 2024-01-23 ENCOUNTER — APPOINTMENT (OUTPATIENT)
Dept: DERMATOLOGY | Facility: CLINIC | Age: 84
End: 2024-01-23
Payer: MEDICARE

## 2024-01-23 DIAGNOSIS — D36.10 BENIGN NEOPLASM OF PERIPHERAL NERVES AND AUTONOMIC NERVOUS SYSTEM, UNSPECIFIED: ICD-10-CM

## 2024-01-23 PROCEDURE — 99214 OFFICE O/P EST MOD 30 MIN: CPT

## 2024-02-03 NOTE — ED ADULT TRIAGE NOTE - STATUS:
Applied
Unwitnessed fall while walking outside, felt "lightheaded" prior to fall. + LOC. Was able to ambulate back home after fall. C/O face and mouth pain, bilateral upper and lower extremities. Abrasions on forehead, nose. Denies blood thinners.

## 2024-02-26 NOTE — ED PROVIDER NOTE - DISCUSSED CLINICAL AND RADIOLOGICAL FINDINGS WITH, MDM
Health Maintenance Due   Topic Date Due    COVID-19 Vaccine (1) Never done    Pneumococcal Vaccine 0-64 (1 of 2 - PCV) Never done    Shingles Vaccine (1 of 2) Never done    Diabetes Foot Exam  11/12/2021    Colorectal Cancer Screen-  03/17/2024    Diabetes A1C  04/23/2024       Patient is due for topics as listed above but is not proceeding with Immunization(s) COVID-19, Pneumococcal, and Shingles, Diabetes A1C, and Diabetes Foot Exam at this time. Orders placed for Colorectal Cancer Screening: iFOBT. Pt states she will have Podiatrist complete her diabetic foot exam. She has declined POCT A1c and prefers lab draw.    patient

## 2024-03-26 ENCOUNTER — APPOINTMENT (OUTPATIENT)
Dept: DERMATOLOGY | Facility: CLINIC | Age: 84
End: 2024-03-26
Payer: MEDICARE

## 2024-03-26 DIAGNOSIS — D48.5 NEOPLASM OF UNCERTAIN BEHAVIOR OF SKIN: ICD-10-CM

## 2024-03-26 DIAGNOSIS — Z79.899 OTHER LONG TERM (CURRENT) DRUG THERAPY: ICD-10-CM

## 2024-03-26 PROCEDURE — 99214 OFFICE O/P EST MOD 30 MIN: CPT

## 2024-03-26 RX ORDER — DAPSONE 25 MG/1
25 TABLET ORAL
Qty: 60 | Refills: 3 | Status: ACTIVE | COMMUNITY
Start: 2023-02-21 | End: 1900-01-01

## 2024-03-27 ENCOUNTER — NON-APPOINTMENT (OUTPATIENT)
Age: 84
End: 2024-03-27

## 2024-03-27 LAB
ALBUMIN SERPL ELPH-MCNC: 4.3 G/DL
ALP BLD-CCNC: 64 U/L
ALT SERPL-CCNC: 11 U/L
AST SERPL-CCNC: 17 U/L
BASOPHILS # BLD AUTO: 0.01 K/UL
BASOPHILS NFR BLD AUTO: 0.2 %
BILIRUB DIRECT SERPL-MCNC: 0.2 MG/DL
BILIRUB INDIRECT SERPL-MCNC: 0.5 MG/DL
BILIRUB SERPL-MCNC: 0.8 MG/DL
EOSINOPHIL # BLD AUTO: 0.03 K/UL
EOSINOPHIL NFR BLD AUTO: 0.5 %
HCT VFR BLD CALC: 39.7 %
HGB BLD-MCNC: 12.6 G/DL
IMM GRANULOCYTES NFR BLD AUTO: 0.3 %
LYMPHOCYTES # BLD AUTO: 1.07 K/UL
LYMPHOCYTES NFR BLD AUTO: 18.2 %
MAN DIFF?: NORMAL
MCHC RBC-ENTMCNC: 31.7 GM/DL
MCHC RBC-ENTMCNC: 32.4 PG
MCV RBC AUTO: 102.1 FL
MONOCYTES # BLD AUTO: 0.57 K/UL
MONOCYTES NFR BLD AUTO: 9.7 %
NEUTROPHILS # BLD AUTO: 4.19 K/UL
NEUTROPHILS NFR BLD AUTO: 71.1 %
PLATELET # BLD AUTO: 196 K/UL
PROT SERPL-MCNC: 6.2 G/DL
RBC # BLD: 3.89 M/UL
RBC # BLD: 3.91 M/UL
RBC # FLD: 14.2 %
RETICS # AUTO: 2.6 %
RETICS AGGREG/RBC NFR: 100.5 K/UL
WBC # FLD AUTO: 5.89 K/UL

## 2024-04-02 LAB — DERMATOLOGY BIOPSY: NORMAL

## 2024-04-16 ENCOUNTER — APPOINTMENT (OUTPATIENT)
Dept: ELECTROPHYSIOLOGY | Facility: CLINIC | Age: 84
End: 2024-04-16
Payer: MEDICARE

## 2024-04-16 ENCOUNTER — NON-APPOINTMENT (OUTPATIENT)
Age: 84
End: 2024-04-16

## 2024-04-16 PROCEDURE — 93296 REM INTERROG EVL PM/IDS: CPT

## 2024-04-16 PROCEDURE — 93295 DEV INTERROG REMOTE 1/2/MLT: CPT

## 2024-06-25 ENCOUNTER — APPOINTMENT (OUTPATIENT)
Dept: DERMATOLOGY | Facility: CLINIC | Age: 84
End: 2024-06-25
Payer: MEDICARE

## 2024-06-25 DIAGNOSIS — L13.1: ICD-10-CM

## 2024-06-25 PROCEDURE — 99214 OFFICE O/P EST MOD 30 MIN: CPT

## 2024-06-25 RX ORDER — CLOBETASOL PROPIONATE 0.5 MG/G
0.05 OINTMENT TOPICAL
Qty: 120 | Refills: 1 | Status: ACTIVE | COMMUNITY
Start: 2023-02-14 | End: 1900-01-01

## 2024-07-16 ENCOUNTER — NON-APPOINTMENT (OUTPATIENT)
Age: 84
End: 2024-07-16

## 2024-07-16 ENCOUNTER — APPOINTMENT (OUTPATIENT)
Dept: CARDIOLOGY | Facility: CLINIC | Age: 84
End: 2024-07-16
Payer: MEDICARE

## 2024-07-16 ENCOUNTER — APPOINTMENT (OUTPATIENT)
Dept: ELECTROPHYSIOLOGY | Facility: CLINIC | Age: 84
End: 2024-07-16
Payer: MEDICARE

## 2024-07-16 VITALS
DIASTOLIC BLOOD PRESSURE: 77 MMHG | OXYGEN SATURATION: 99 % | WEIGHT: 197 LBS | HEART RATE: 60 BPM | BODY MASS INDEX: 30.85 KG/M2 | SYSTOLIC BLOOD PRESSURE: 132 MMHG

## 2024-07-16 DIAGNOSIS — E11.9 TYPE 2 DIABETES MELLITUS W/OUT COMPLICATIONS: ICD-10-CM

## 2024-07-16 DIAGNOSIS — I48.20 CHRONIC ATRIAL FIBRILLATION, UNSP: ICD-10-CM

## 2024-07-16 PROCEDURE — 93000 ELECTROCARDIOGRAM COMPLETE: CPT

## 2024-07-16 PROCEDURE — 93282 PRGRMG EVAL IMPLANTABLE DFB: CPT

## 2024-07-16 PROCEDURE — G2211 COMPLEX E/M VISIT ADD ON: CPT

## 2024-07-16 PROCEDURE — 99214 OFFICE O/P EST MOD 30 MIN: CPT

## 2024-07-22 NOTE — PATIENT PROFILE ADULT - PRIMARY SOURCE OF SUPPORT/COMFORT
Addended by: LADONNA DONNELLY on: 7/22/2024 03:44 PM     Modules accepted: Orders     child(katelyn)

## 2024-09-03 NOTE — ASU PATIENT PROFILE, ADULT - IS PATIENT PREGNANT?
not applicable (Male)
31-year-old male history of childhood asthma complaining of URI type symptoms starting 1 week ago after a COVID exposure from a student at his school (patient reports negative home COVID test 1 day after exposure  Aunt).  Patient reports he had fever sore throat congestion coughing much of which have improved but the cough is continued and has been hard to control.  Patient has been taking DayQuil without significant relief of symptoms.  Today patient noted 2 episodes of hemoptysis with blood-streaked sputum.  No history of hemoptysis.  No blood thinners or other bleeding or bruising.  No history of blood clots, lower extremity pain or swelling, recent travel or immobilization.  Patient reports negative TB test to return to school several months ago.  No history of TB exposure.  No tobacco history, night sweats, weight loss.  Pt notes pleuritic cp w cough only, o/w no cp.  No sob.

## 2024-09-17 ENCOUNTER — APPOINTMENT (OUTPATIENT)
Dept: DERMATOLOGY | Facility: CLINIC | Age: 84
End: 2024-09-17
Payer: MEDICARE

## 2024-09-17 VITALS — HEIGHT: 67 IN | BODY MASS INDEX: 30.61 KG/M2 | WEIGHT: 195 LBS

## 2024-09-17 DIAGNOSIS — L82.1 OTHER SEBORRHEIC KERATOSIS: ICD-10-CM

## 2024-09-17 DIAGNOSIS — L13.1: ICD-10-CM

## 2024-09-17 PROCEDURE — 99214 OFFICE O/P EST MOD 30 MIN: CPT

## 2024-09-17 NOTE — PHYSICAL EXAM
[Alert] : alert [Oriented x 3] : ~L oriented x 3 [Well Nourished] : well nourished [Conjunctiva Non-injected] : conjunctiva non-injected [No Visual Lymphadenopathy] : no visual  lymphadenopathy [No Clubbing] : no clubbing [No Edema] : no edema [No Bromhidrosis] : no bromhidrosis [No Chromhidrosis] : no chromhidrosis [Neck] : Neck [Chest] : Chest [Abdomen] : Abdomen [Back] : Back [FreeTextEntry3] : erythematous plaques studded with grouped superficial pustules, forming larger annular plaque stuck-on brown papule on back

## 2024-09-17 NOTE — HISTORY OF PRESENT ILLNESS
[FreeTextEntry1] : f/u [facility] [de-identified] : 83M with hx Dannie Welch here with wife for f/u. Stopped dapsone in 5/2023, then retried and stopped again in 1/2024 - currently using Clob oint x 2 weeks, takes 1 week off. Notes clob is effective but rash flares w pimples in off weeks.  - previously tolerated dapsone well and with significant rapid improvement, but when restarted in 1/2024 self discontinued after 2 blood draws consistent with anemia, pt did not feel well.   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

## 2024-10-15 ENCOUNTER — APPOINTMENT (OUTPATIENT)
Dept: ELECTROPHYSIOLOGY | Facility: CLINIC | Age: 84
End: 2024-10-15

## 2024-10-15 ENCOUNTER — NON-APPOINTMENT (OUTPATIENT)
Age: 84
End: 2024-10-15

## 2024-10-15 PROCEDURE — 93295 DEV INTERROG REMOTE 1/2/MLT: CPT

## 2024-10-15 PROCEDURE — 93296 REM INTERROG EVL PM/IDS: CPT

## 2024-11-05 ENCOUNTER — RX RENEWAL (OUTPATIENT)
Age: 84
End: 2024-11-05

## 2024-11-19 ENCOUNTER — APPOINTMENT (OUTPATIENT)
Dept: ELECTROPHYSIOLOGY | Facility: CLINIC | Age: 84
End: 2024-11-19

## 2024-12-10 NOTE — H&P PST ADULT - VENOUS THROMBOEMBOLISM CURRENT LABS/TEST RESULTS
Left msg to let the patient know that his appointment on 12/28 with Dr. Bae has been canceled, because he will be out of the office, and call [phone] to reschedule.  Can use frozen days 12/13, 12/20 & 12/21 or transfer call to [place] .   none

## 2024-12-19 ENCOUNTER — NON-APPOINTMENT (OUTPATIENT)
Age: 84
End: 2024-12-19

## 2024-12-19 LAB
ALBUMIN SERPL ELPH-MCNC: 4.4 G/DL
ALP BLD-CCNC: 71 U/L
ALT SERPL-CCNC: 7 U/L
AST SERPL-CCNC: 17 U/L
BASOPHILS # BLD AUTO: 0.02 K/UL
BASOPHILS NFR BLD AUTO: 0.5 %
BILIRUB DIRECT SERPL-MCNC: 0.2 MG/DL
BILIRUB INDIRECT SERPL-MCNC: 0.5 MG/DL
BILIRUB SERPL-MCNC: 0.8 MG/DL
EOSINOPHIL # BLD AUTO: 0.06 K/UL
EOSINOPHIL NFR BLD AUTO: 1.4 %
HCT VFR BLD CALC: 43.2 %
HGB BLD-MCNC: 13.7 G/DL
IMM GRANULOCYTES NFR BLD AUTO: 0.2 %
LYMPHOCYTES # BLD AUTO: 0.66 K/UL
LYMPHOCYTES NFR BLD AUTO: 15.6 %
MAN DIFF?: NORMAL
MCHC RBC-ENTMCNC: 31 PG
MCHC RBC-ENTMCNC: 31.7 G/DL
MCV RBC AUTO: 97.7 FL
MONOCYTES # BLD AUTO: 0.41 K/UL
MONOCYTES NFR BLD AUTO: 9.7 %
NEUTROPHILS # BLD AUTO: 3.07 K/UL
NEUTROPHILS NFR BLD AUTO: 72.6 %
PLATELET # BLD AUTO: 184 K/UL
PROT SERPL-MCNC: 6.6 G/DL
RBC # BLD: 4.42 M/UL
RBC # FLD: 14.1 %
WBC # FLD AUTO: 4.23 K/UL

## 2025-01-23 ENCOUNTER — APPOINTMENT (OUTPATIENT)
Dept: ELECTROPHYSIOLOGY | Facility: CLINIC | Age: 85
End: 2025-01-23

## 2025-01-23 ENCOUNTER — APPOINTMENT (OUTPATIENT)
Dept: CARDIOLOGY | Facility: CLINIC | Age: 85
End: 2025-01-23
Payer: MEDICARE

## 2025-01-23 ENCOUNTER — NON-APPOINTMENT (OUTPATIENT)
Age: 85
End: 2025-01-23

## 2025-01-23 VITALS
OXYGEN SATURATION: 96 % | SYSTOLIC BLOOD PRESSURE: 136 MMHG | BODY MASS INDEX: 31.39 KG/M2 | DIASTOLIC BLOOD PRESSURE: 77 MMHG | WEIGHT: 200 LBS | HEIGHT: 67 IN | HEART RATE: 60 BPM

## 2025-01-23 DIAGNOSIS — I42.2 OTHER HYPERTROPHIC CARDIOMYOPATHY: ICD-10-CM

## 2025-01-23 DIAGNOSIS — I10 ESSENTIAL (PRIMARY) HYPERTENSION: ICD-10-CM

## 2025-01-23 DIAGNOSIS — E11.9 TYPE 2 DIABETES MELLITUS W/OUT COMPLICATIONS: ICD-10-CM

## 2025-01-23 DIAGNOSIS — I48.20 CHRONIC ATRIAL FIBRILLATION, UNSP: ICD-10-CM

## 2025-01-23 PROCEDURE — 93000 ELECTROCARDIOGRAM COMPLETE: CPT

## 2025-01-23 PROCEDURE — 93283 PRGRMG EVAL IMPLANTABLE DFB: CPT

## 2025-01-23 PROCEDURE — 99214 OFFICE O/P EST MOD 30 MIN: CPT

## 2025-01-23 PROCEDURE — G2211 COMPLEX E/M VISIT ADD ON: CPT

## 2025-02-03 ENCOUNTER — RX RENEWAL (OUTPATIENT)
Age: 85
End: 2025-02-03

## 2025-03-20 ENCOUNTER — APPOINTMENT (OUTPATIENT)
Dept: DERMATOLOGY | Facility: CLINIC | Age: 85
End: 2025-03-20
Payer: MEDICARE

## 2025-03-20 DIAGNOSIS — L13.1: ICD-10-CM

## 2025-03-20 DIAGNOSIS — Z79.899 OTHER LONG TERM (CURRENT) DRUG THERAPY: ICD-10-CM

## 2025-03-20 PROCEDURE — 99214 OFFICE O/P EST MOD 30 MIN: CPT

## 2025-03-20 PROCEDURE — G2211 COMPLEX E/M VISIT ADD ON: CPT

## 2025-04-14 ENCOUNTER — APPOINTMENT (OUTPATIENT)
Dept: CV DIAGNOSITCS | Facility: HOSPITAL | Age: 85
End: 2025-04-14

## 2025-04-17 ENCOUNTER — APPOINTMENT (OUTPATIENT)
Dept: CV DIAGNOSITCS | Facility: HOSPITAL | Age: 85
End: 2025-04-17

## 2025-04-17 ENCOUNTER — RESULT REVIEW (OUTPATIENT)
Age: 85
End: 2025-04-17

## 2025-04-17 ENCOUNTER — OUTPATIENT (OUTPATIENT)
Dept: OUTPATIENT SERVICES | Facility: HOSPITAL | Age: 85
LOS: 1 days | End: 2025-04-17
Payer: MEDICARE

## 2025-04-17 DIAGNOSIS — Z98.890 OTHER SPECIFIED POSTPROCEDURAL STATES: Chronic | ICD-10-CM

## 2025-04-17 DIAGNOSIS — Z95.810 PRESENCE OF AUTOMATIC (IMPLANTABLE) CARDIAC DEFIBRILLATOR: Chronic | ICD-10-CM

## 2025-04-17 DIAGNOSIS — I25.10 ATHEROSCLEROTIC HEART DISEASE OF NATIVE CORONARY ARTERY WITHOUT ANGINA PECTORIS: ICD-10-CM

## 2025-04-17 DIAGNOSIS — Z90.79 ACQUIRED ABSENCE OF OTHER GENITAL ORGAN(S): Chronic | ICD-10-CM

## 2025-04-17 PROCEDURE — 93306 TTE W/DOPPLER COMPLETE: CPT | Mod: 26

## 2025-04-17 PROCEDURE — 93306 TTE W/DOPPLER COMPLETE: CPT

## 2025-04-24 ENCOUNTER — APPOINTMENT (OUTPATIENT)
Dept: ELECTROPHYSIOLOGY | Facility: CLINIC | Age: 85
End: 2025-04-24
Payer: MEDICARE

## 2025-04-24 ENCOUNTER — NON-APPOINTMENT (OUTPATIENT)
Age: 85
End: 2025-04-24

## 2025-04-24 PROCEDURE — 93295 DEV INTERROG REMOTE 1/2/MLT: CPT

## 2025-04-24 PROCEDURE — 93296 REM INTERROG EVL PM/IDS: CPT

## 2025-05-08 NOTE — PHYSICAL THERAPY INITIAL EVALUATION ADULT - DEEP PRESSURE SENSATION, LUE, REHAB EVAL
Received DIXIE from [facility]  in [place]    Patient is transferring OUT.    DIXIE placed in medical records bin.    
We received DIXIE for patient. It has been sent to the medical records team. If parent calls to follow up please provide them with Medical Records phone number at [phone].    
within normal limits

## 2025-05-13 ENCOUNTER — APPOINTMENT (OUTPATIENT)
Dept: DERMATOLOGY | Facility: CLINIC | Age: 85
End: 2025-05-13
Payer: MEDICARE

## 2025-05-13 DIAGNOSIS — L13.1: ICD-10-CM

## 2025-05-13 PROCEDURE — 99214 OFFICE O/P EST MOD 30 MIN: CPT

## 2025-06-19 NOTE — H&P PST ADULT - NEGATIVE CARDIOVASCULAR SYMPTOMS
Patient's belongings, including dentures, glasses with case, cell phone, and  and clothing all sent with patient.    no peripheral edema/no paroxysmal nocturnal dyspnea/no chest pain/no dyspnea on exertion

## 2025-06-27 ENCOUNTER — RX RENEWAL (OUTPATIENT)
Age: 85
End: 2025-06-27

## 2025-07-28 ENCOUNTER — RX RENEWAL (OUTPATIENT)
Age: 85
End: 2025-07-28

## 2025-07-29 ENCOUNTER — APPOINTMENT (OUTPATIENT)
Dept: ELECTROPHYSIOLOGY | Facility: CLINIC | Age: 85
End: 2025-07-29
Payer: MEDICARE

## 2025-07-29 ENCOUNTER — APPOINTMENT (OUTPATIENT)
Dept: CARDIOLOGY | Facility: CLINIC | Age: 85
End: 2025-07-29
Payer: MEDICARE

## 2025-07-29 ENCOUNTER — NON-APPOINTMENT (OUTPATIENT)
Age: 85
End: 2025-07-29

## 2025-07-29 VITALS
WEIGHT: 190 LBS | HEIGHT: 67 IN | OXYGEN SATURATION: 100 % | HEART RATE: 60 BPM | SYSTOLIC BLOOD PRESSURE: 129 MMHG | DIASTOLIC BLOOD PRESSURE: 82 MMHG | BODY MASS INDEX: 29.82 KG/M2

## 2025-07-29 DIAGNOSIS — I42.2 OTHER HYPERTROPHIC CARDIOMYOPATHY: ICD-10-CM

## 2025-07-29 DIAGNOSIS — I48.20 CHRONIC ATRIAL FIBRILLATION, UNSP: ICD-10-CM

## 2025-07-29 PROCEDURE — 93000 ELECTROCARDIOGRAM COMPLETE: CPT

## 2025-07-29 PROCEDURE — 93283 PRGRMG EVAL IMPLANTABLE DFB: CPT

## 2025-07-29 PROCEDURE — G2211 COMPLEX E/M VISIT ADD ON: CPT

## 2025-07-29 PROCEDURE — 99214 OFFICE O/P EST MOD 30 MIN: CPT

## 2025-07-29 PROCEDURE — 99213 OFFICE O/P EST LOW 20 MIN: CPT
